# Patient Record
Sex: MALE | Race: WHITE | Employment: OTHER | URBAN - METROPOLITAN AREA
[De-identification: names, ages, dates, MRNs, and addresses within clinical notes are randomized per-mention and may not be internally consistent; named-entity substitution may affect disease eponyms.]

---

## 2017-01-19 ENCOUNTER — OFFICE VISIT (OUTPATIENT)
Dept: FAMILY MEDICINE CLINIC | Age: 67
End: 2017-01-19

## 2017-01-19 VITALS
SYSTOLIC BLOOD PRESSURE: 102 MMHG | WEIGHT: 146.38 LBS | HEIGHT: 67 IN | RESPIRATION RATE: 12 BRPM | HEART RATE: 60 BPM | DIASTOLIC BLOOD PRESSURE: 64 MMHG | BODY MASS INDEX: 22.98 KG/M2

## 2017-01-19 DIAGNOSIS — I25.10 ASHD (ARTERIOSCLEROTIC HEART DISEASE): Primary | ICD-10-CM

## 2017-01-19 DIAGNOSIS — Z00.00 ROUTINE GENERAL MEDICAL EXAMINATION AT A HEALTH CARE FACILITY: ICD-10-CM

## 2017-01-19 DIAGNOSIS — G47.31 COMPLEX SLEEP APNEA SYNDROME: ICD-10-CM

## 2017-01-19 DIAGNOSIS — N40.0 BENIGN NON-NODULAR PROSTATIC HYPERPLASIA WITHOUT LOWER URINARY TRACT SYMPTOMS: ICD-10-CM

## 2017-01-19 DIAGNOSIS — E78.00 HYPERCHOLESTEREMIA: ICD-10-CM

## 2017-01-19 PROCEDURE — G0402 INITIAL PREVENTIVE EXAM: HCPCS | Performed by: FAMILY MEDICINE

## 2017-01-19 ASSESSMENT — LIFESTYLE VARIABLES
HOW MANY STANDARD DRINKS CONTAINING ALCOHOL DO YOU HAVE ON A TYPICAL DAY: 1
HOW OFTEN DURING THE LAST YEAR HAVE YOU FOUND THAT YOU WERE NOT ABLE TO STOP DRINKING ONCE YOU HAD STARTED: 0
HOW OFTEN DURING THE LAST YEAR HAVE YOU HAD A FEELING OF GUILT OR REMORSE AFTER DRINKING: 0
HOW OFTEN DO YOU HAVE SIX OR MORE DRINKS ON ONE OCCASION: 1
HAS A RELATIVE, FRIEND, DOCTOR, OR ANOTHER HEALTH PROFESSIONAL EXPRESSED CONCERN ABOUT YOUR DRINKING OR SUGGESTED YOU CUT DOWN: 0
HOW OFTEN DURING THE LAST YEAR HAVE YOU FAILED TO DO WHAT WAS NORMALLY EXPECTED FROM YOU BECAUSE OF DRINKING: 0
AUDIT-C TOTAL SCORE: 5
HOW OFTEN DO YOU HAVE A DRINK CONTAINING ALCOHOL: 3
HOW OFTEN DURING THE LAST YEAR HAVE YOU NEEDED AN ALCOHOLIC DRINK FIRST THING IN THE MORNING TO GET YOURSELF GOING AFTER A NIGHT OF HEAVY DRINKING: 0
HOW OFTEN DURING THE LAST YEAR HAVE YOU BEEN UNABLE TO REMEMBER WHAT HAPPENED THE NIGHT BEFORE BECAUSE YOU HAD BEEN DRINKING: 0
AUDIT TOTAL SCORE: 5
HAVE YOU OR SOMEONE ELSE BEEN INJURED AS A RESULT OF YOUR DRINKING: 0

## 2017-01-19 ASSESSMENT — PATIENT HEALTH QUESTIONNAIRE - PHQ9: SUM OF ALL RESPONSES TO PHQ QUESTIONS 1-9: 0

## 2017-01-19 ASSESSMENT — ANXIETY QUESTIONNAIRES: GAD7 TOTAL SCORE: 0

## 2017-02-02 RX ORDER — METHYLPHENIDATE HYDROCHLORIDE 10 MG/1
10 TABLET ORAL 3 TIMES DAILY
Qty: 270 TABLET | Refills: 0 | Status: SHIPPED | OUTPATIENT
Start: 2017-02-02 | End: 2017-05-30 | Stop reason: SDUPTHER

## 2017-03-06 ENCOUNTER — TELEPHONE (OUTPATIENT)
Dept: FAMILY MEDICINE CLINIC | Age: 67
End: 2017-03-06

## 2017-04-06 DIAGNOSIS — E78.00 HYPERCHOLESTEREMIA: ICD-10-CM

## 2017-04-07 RX ORDER — ATORVASTATIN CALCIUM 40 MG/1
TABLET, FILM COATED ORAL
Qty: 30 TABLET | Refills: 10 | Status: SHIPPED | OUTPATIENT
Start: 2017-04-07 | End: 2018-04-28 | Stop reason: SDUPTHER

## 2017-05-11 ENCOUNTER — OFFICE VISIT (OUTPATIENT)
Dept: PULMONOLOGY | Age: 67
End: 2017-05-11

## 2017-05-11 VITALS
WEIGHT: 148 LBS | HEIGHT: 67 IN | HEART RATE: 71 BPM | BODY MASS INDEX: 23.23 KG/M2 | OXYGEN SATURATION: 98 % | SYSTOLIC BLOOD PRESSURE: 128 MMHG | DIASTOLIC BLOOD PRESSURE: 82 MMHG

## 2017-05-11 DIAGNOSIS — R06.3 CENTRAL SLEEP APNEA DUE TO CHEYNE-STOKES RESPIRATION: ICD-10-CM

## 2017-05-11 DIAGNOSIS — I25.10 CORONARY ARTERY DISEASE INVOLVING NATIVE CORONARY ARTERY OF NATIVE HEART WITHOUT ANGINA PECTORIS: ICD-10-CM

## 2017-05-11 DIAGNOSIS — I25.10 ASHD (ARTERIOSCLEROTIC HEART DISEASE): ICD-10-CM

## 2017-05-11 DIAGNOSIS — G47.31 COMPLEX SLEEP APNEA SYNDROME: Primary | ICD-10-CM

## 2017-05-11 PROCEDURE — 99213 OFFICE O/P EST LOW 20 MIN: CPT | Performed by: PHYSICIAN ASSISTANT

## 2017-05-18 ENCOUNTER — TELEPHONE (OUTPATIENT)
Dept: PULMONOLOGY | Age: 67
End: 2017-05-18

## 2017-05-18 DIAGNOSIS — G47.31 COMPLEX SLEEP APNEA SYNDROME: Primary | ICD-10-CM

## 2017-05-19 ENCOUNTER — TELEPHONE (OUTPATIENT)
Dept: FAMILY MEDICINE CLINIC | Age: 67
End: 2017-05-19

## 2017-05-30 RX ORDER — METHYLPHENIDATE HYDROCHLORIDE 10 MG/1
10 TABLET ORAL 3 TIMES DAILY
Qty: 270 TABLET | Refills: 0 | Status: SHIPPED | OUTPATIENT
Start: 2017-05-30 | End: 2017-05-31 | Stop reason: SDUPTHER

## 2017-05-31 RX ORDER — METHYLPHENIDATE HYDROCHLORIDE 10 MG/1
10 TABLET ORAL 3 TIMES DAILY
Qty: 270 TABLET | Refills: 0 | Status: SHIPPED | OUTPATIENT
Start: 2017-05-31 | End: 2017-10-05

## 2017-07-28 ENCOUNTER — OFFICE VISIT (OUTPATIENT)
Dept: FAMILY MEDICINE CLINIC | Age: 67
End: 2017-07-28

## 2017-07-28 VITALS
HEIGHT: 68 IN | RESPIRATION RATE: 14 BRPM | WEIGHT: 150.25 LBS | HEART RATE: 68 BPM | BODY MASS INDEX: 22.77 KG/M2 | DIASTOLIC BLOOD PRESSURE: 74 MMHG | SYSTOLIC BLOOD PRESSURE: 124 MMHG

## 2017-07-28 DIAGNOSIS — I25.10 ASHD (ARTERIOSCLEROTIC HEART DISEASE): Primary | ICD-10-CM

## 2017-07-28 DIAGNOSIS — I77.9 CAROTID DISEASE, BILATERAL (HCC): ICD-10-CM

## 2017-07-28 DIAGNOSIS — N40.1 BENIGN NON-NODULAR PROSTATIC HYPERPLASIA WITH LOWER URINARY TRACT SYMPTOMS: ICD-10-CM

## 2017-07-28 DIAGNOSIS — G47.31 COMPLEX SLEEP APNEA SYNDROME: ICD-10-CM

## 2017-07-28 DIAGNOSIS — K63.5 COLON POLYPS: ICD-10-CM

## 2017-07-28 LAB
HEMOCCULT STL QL: NEGATIVE
HEMOCCULT STL QL: NORMAL
HEMOCCULT STL QL: NORMAL

## 2017-07-28 PROCEDURE — 99214 OFFICE O/P EST MOD 30 MIN: CPT | Performed by: FAMILY MEDICINE

## 2017-07-28 PROCEDURE — 82270 OCCULT BLOOD FECES: CPT | Performed by: FAMILY MEDICINE

## 2017-07-28 RX ORDER — FLUTICASONE PROPIONATE 50 MCG
1 SPRAY, SUSPENSION (ML) NASAL EVERY OTHER DAY
COMMUNITY
End: 2017-09-19

## 2017-07-28 RX ORDER — CETIRIZINE HYDROCHLORIDE 10 MG/1
10 TABLET ORAL DAILY
COMMUNITY
End: 2022-02-18

## 2017-07-28 ASSESSMENT — ENCOUNTER SYMPTOMS
NAUSEA: 0
EYE PAIN: 0
COUGH: 0
CHEST TIGHTNESS: 0
ABDOMINAL PAIN: 0
BLOOD IN STOOL: 0
TROUBLE SWALLOWING: 0
BACK PAIN: 0
SORE THROAT: 0
CONSTIPATION: 0
SHORTNESS OF BREATH: 0

## 2017-07-28 ASSESSMENT — PATIENT HEALTH QUESTIONNAIRE - PHQ9
1. LITTLE INTEREST OR PLEASURE IN DOING THINGS: 0
2. FEELING DOWN, DEPRESSED OR HOPELESS: 0
SUM OF ALL RESPONSES TO PHQ9 QUESTIONS 1 & 2: 0
SUM OF ALL RESPONSES TO PHQ QUESTIONS 1-9: 0

## 2017-09-19 ENCOUNTER — OFFICE VISIT (OUTPATIENT)
Dept: FAMILY MEDICINE CLINIC | Age: 67
End: 2017-09-19

## 2017-09-19 VITALS
SYSTOLIC BLOOD PRESSURE: 112 MMHG | HEIGHT: 68 IN | HEART RATE: 60 BPM | DIASTOLIC BLOOD PRESSURE: 60 MMHG | WEIGHT: 153.38 LBS | RESPIRATION RATE: 10 BRPM | BODY MASS INDEX: 23.25 KG/M2

## 2017-09-19 DIAGNOSIS — Z23 NEED FOR IMMUNIZATION AGAINST INFLUENZA: ICD-10-CM

## 2017-09-19 DIAGNOSIS — K13.0 CELLULITIS OF SKIN OF LIP: Primary | ICD-10-CM

## 2017-09-19 PROCEDURE — G0008 ADMIN INFLUENZA VIRUS VAC: HCPCS | Performed by: FAMILY MEDICINE

## 2017-09-19 PROCEDURE — 99213 OFFICE O/P EST LOW 20 MIN: CPT | Performed by: FAMILY MEDICINE

## 2017-09-19 RX ORDER — IPRATROPIUM BROMIDE 42 UG/1
2 SPRAY, METERED NASAL DAILY PRN
COMMUNITY
End: 2022-02-18

## 2017-09-19 RX ORDER — CEPHALEXIN 500 MG/1
500 CAPSULE ORAL 3 TIMES DAILY
Qty: 30 CAPSULE | Refills: 0 | Status: SHIPPED | OUTPATIENT
Start: 2017-09-19 | End: 2017-10-05

## 2017-09-26 ENCOUNTER — HOSPITAL ENCOUNTER (OUTPATIENT)
Dept: AUDIOLOGY | Age: 67
Discharge: HOME | End: 2017-09-26

## 2017-09-26 PROCEDURE — 92593 HC HEARING AID CHECK, BOTH EARS: CPT | Performed by: AUDIOLOGIST

## 2017-10-05 ENCOUNTER — OFFICE VISIT (OUTPATIENT)
Dept: PULMONOLOGY | Age: 67
End: 2017-10-05
Payer: COMMERCIAL

## 2017-10-05 VITALS
DIASTOLIC BLOOD PRESSURE: 82 MMHG | SYSTOLIC BLOOD PRESSURE: 128 MMHG | HEART RATE: 63 BPM | WEIGHT: 151.8 LBS | OXYGEN SATURATION: 99 % | HEIGHT: 67 IN | BODY MASS INDEX: 23.83 KG/M2

## 2017-10-05 DIAGNOSIS — G47.419 PRIMARY NARCOLEPSY WITHOUT CATAPLEXY: ICD-10-CM

## 2017-10-05 DIAGNOSIS — G47.31 COMPLEX SLEEP APNEA SYNDROME: Primary | ICD-10-CM

## 2017-10-05 DIAGNOSIS — R06.3 CENTRAL SLEEP APNEA DUE TO CHEYNE-STOKES RESPIRATION: ICD-10-CM

## 2017-10-05 PROCEDURE — 99213 OFFICE O/P EST LOW 20 MIN: CPT | Performed by: PHYSICIAN ASSISTANT

## 2017-10-05 RX ORDER — ARMODAFINIL 200 MG/1
1 TABLET ORAL DAILY
Qty: 30 TABLET | Refills: 0
Start: 2017-10-05 | End: 2017-10-30

## 2017-10-05 NOTE — PROGRESS NOTES
 COLONOSCOPY  2007,         CORONARY ANGIOPLASTY WITH STENT PLACEMENT  7/05    HEMORRHOID SURGERY  09/2016    Dr Nazario Crowe, every 2 weeks x 3 times     ROTATOR CUFF REPAIR      ROTATOR CUFF REPAIR Left 10-08-16    Dr Ignacio Wade @ [address] History   Substance Use Topics    Smoking status: Former Smoker     Types: Cigarettes     Quit date: 7/2/2005    Smokeless tobacco: Never Used    Alcohol use Yes      Comment: social       No Known Allergies    Current Outpatient Prescriptions   Medication Sig Dispense Refill    ipratropium (ATROVENT) 0.06 % nasal spray 2 sprays by Nasal route 3 times daily      cetirizine (ZYRTEC) 10 MG tablet Take 10 mg by mouth every other day      methylphenidate (RITALIN) 10 MG tablet Take 1 tablet by mouth 3 times daily . 270 tablet 0    atorvastatin (LIPITOR) 40 MG tablet TAKE 1 TABLET BY MOUTH ONE TIME A DAY  30 tablet 10    NONFORMULARY Indications: [employer] Select       fluocinonide (LIDEX) 0.05 % gel Apply topically 2 times daily. 15 g 1    B Complex Vitamins (VITAMIN-B COMPLEX PO) Take 1 tablet by mouth daily.  Calcium Carbonate-Vitamin D (CALCIUM 500/VITAMIN D PO) Take 1 tablet by mouth daily.  aspirin 81 MG chewable tablet Take 81 mg by mouth daily. No current facility-administered medications for this visit.         Family History   Problem Relation Age of Onset    Stroke Mother     Diabetes Mother     Heart Disease Father         Review of Systems -   General ROS: stable / unchanged  ENT ROS: negative for - nasal congestion, oral lesions or sore throat  Hematological and Lymphatic ROS: negative  Endocrine ROS: negative  Respiratory ROS: no cough, shortness of breath, or wheezing  Cardiovascular ROS: no chest pain or dyspnea on exertion  Gastrointestinal ROS: no abdominal pain, change in bowel habits, or black or bloody stools  Musculoskeletal ROS: negative  Neurological ROS: negative    Physical Exam:    BMI:  Body

## 2017-10-27 ENCOUNTER — OFFICE VISIT (OUTPATIENT)
Dept: CARDIOLOGY CLINIC | Age: 67
End: 2017-10-27
Payer: COMMERCIAL

## 2017-10-27 VITALS
HEIGHT: 67 IN | DIASTOLIC BLOOD PRESSURE: 70 MMHG | BODY MASS INDEX: 24.16 KG/M2 | WEIGHT: 153.9 LBS | SYSTOLIC BLOOD PRESSURE: 140 MMHG | HEART RATE: 62 BPM

## 2017-10-27 DIAGNOSIS — E78.01 FAMILIAL HYPERCHOLESTEROLEMIA: ICD-10-CM

## 2017-10-27 DIAGNOSIS — I25.118 CORONARY ARTERY DISEASE INVOLVING NATIVE CORONARY ARTERY OF NATIVE HEART WITH OTHER FORM OF ANGINA PECTORIS (HCC): ICD-10-CM

## 2017-10-27 DIAGNOSIS — R06.09 DYSPNEA ON EXERTION: Primary | ICD-10-CM

## 2017-10-27 PROCEDURE — 93000 ELECTROCARDIOGRAM COMPLETE: CPT | Performed by: NUCLEAR MEDICINE

## 2017-10-27 PROCEDURE — 99213 OFFICE O/P EST LOW 20 MIN: CPT | Performed by: NUCLEAR MEDICINE

## 2017-10-27 RX ORDER — METOPROLOL SUCCINATE 25 MG/1
25 TABLET, EXTENDED RELEASE ORAL DAILY
COMMUNITY
End: 2017-10-27 | Stop reason: SDUPTHER

## 2017-10-27 RX ORDER — METOPROLOL SUCCINATE 25 MG/1
25 TABLET, EXTENDED RELEASE ORAL DAILY
Qty: 30 TABLET | Refills: 6 | Status: SHIPPED | OUTPATIENT
Start: 2017-10-27 | End: 2017-11-09

## 2017-10-27 NOTE — PROGRESS NOTES
[facility]  [facility] [place] [facility] [place] [address]  Dept: [phone]  Dept Fax: [phone]  Loc: [phone]    Visit Date: 10/27/2017    Sally Lozoya is a 78 y.o. male who presents today for:  Chief Complaint   Patient presents with    Shortness of Breath    Coronary Artery Disease    Hyperlipidemia     Worsening dyspnea on exertion   Lower capacity with exertion   Major change  Known stents 2005  No cath or stress test lately   Higher risk patient  BP is stable   No obvious chest pain       HPI:  HPI  Past Medical History:   Diagnosis Date    Actinic keratoses     ASHD (arteriosclerotic heart disease)     Carotid disease, bilateral ([address] 2017    Colon polyps 5/07    Hypercholesteremia 7/97    Narcolepsy     Rotator cuff (capsule) sprain     Tobacco abuse     Unspecified sleep apnea       Past Surgical History:   Procedure Laterality Date    APPENDECTOMY      CATARACT REMOVAL Bilateral 2016    Right- Oct, Left- Dec- [facility]     COLONOSCOPY  2007,     sheik    CORONARY ANGIOPLASTY WITH STENT PLACEMENT  7/05    HEMORRHOID SURGERY  09/2016    Dr Lyudmila Brice, every 2 weeks x 3 times     ROTATOR CUFF REPAIR      ROTATOR CUFF REPAIR Left 10-08-16    Dr Tamara Richardson @ [facility]     TONSILLECTOMY       Family History   Problem Relation Age of Onset    Stroke Mother     Diabetes Mother     Heart Disease Father      Social History   Substance Use Topics    Smoking status: Former Smoker     Types: Cigarettes     Quit date: 7/2/2005    Smokeless tobacco: Never Used    Alcohol use Yes      Comment: social      Current Outpatient Prescriptions   Medication Sig Dispense Refill    Armodafinil (NUVIGIL) 200 MG TABS Take 1 tablet by mouth daily 30 tablet 0    ipratropium (ATROVENT) 0.06 % nasal spray 2 sprays by Nasal route 3 times daily      cetirizine (ZYRTEC) 10 MG tablet Take 10 mg by mouth every other day      atorvastatin (LIPITOR) 40 MG tablet high risk   'ECG in office was done today. I reviewed the ECG. No acute findings      Plan:  No Follow-up on file. Discussed options  Low dose toprol   Cardiac cathterizaion, risks and benefits discussed with the patient and family at length. Patient was agreeable. Patient was advised to report to the ER if he has recurrent symptoms with specific instructions given about severity and duration of symptoms    Continue risk factor modification and medical management  Thank you for allowing me to participate in the care of your patient. Please don't hesitate to contact me regarding any further issues related to the patient care    Orders Placed:  Orders Placed This Encounter   Procedures    EKG 12 Lead     Order Specific Question:   Reason for Exam?     Answer: Other       Medications Prescribed:  No orders of the defined types were placed in this encounter. Discussed use, benefit, and side effects of prescribed medications. All patient questions answered. Pt voiced understanding. Instructed to continue current medications, diet and exercise. Continue risk factor modification and medical management. Patient agreed with treatment plan. Follow up as directed.     Electronically signed by Esther Murray MD on 10/27/2017 at 1:01 PM

## 2017-10-27 NOTE — PROGRESS NOTES
Patient here for a 1 year follow up    EKG done today    Patient denies cp, dizziness, swelling and palpitations     Patient complains of sob

## 2017-10-30 ENCOUNTER — TELEPHONE (OUTPATIENT)
Dept: PULMONOLOGY | Age: 67
End: 2017-10-30

## 2017-10-30 DIAGNOSIS — G47.419 PRIMARY NARCOLEPSY WITHOUT CATAPLEXY: Primary | ICD-10-CM

## 2017-10-30 RX ORDER — DEXTROAMPHETAMINE SACCHARATE, AMPHETAMINE ASPARTATE, DEXTROAMPHETAMINE SULFATE AND AMPHETAMINE SULFATE 2.5; 2.5; 2.5; 2.5 MG/1; MG/1; MG/1; MG/1
10 TABLET ORAL 2 TIMES DAILY
Qty: 60 TABLET | Refills: 0 | Status: SHIPPED | OUTPATIENT
Start: 2017-10-30 | End: 2017-10-30 | Stop reason: ALTCHOICE

## 2017-10-30 RX ORDER — ARMODAFINIL 200 MG/1
TABLET ORAL DAILY
COMMUNITY
End: 2017-11-08

## 2017-10-30 RX ORDER — M-VIT,TX,IRON,MINS/CALC/FOLIC 27MG-0.4MG
1 TABLET ORAL DAILY
Status: ON HOLD | COMMUNITY
End: 2017-11-02 | Stop reason: ALTCHOICE

## 2017-10-30 RX ORDER — DEXTROAMPHETAMINE SACCHARATE, AMPHETAMINE ASPARTATE, DEXTROAMPHETAMINE SULFATE AND AMPHETAMINE SULFATE 2.5; 2.5; 2.5; 2.5 MG/1; MG/1; MG/1; MG/1
10 TABLET ORAL 2 TIMES DAILY
Qty: 60 TABLET | Refills: 0 | Status: SHIPPED | OUTPATIENT
Start: 2017-10-30 | End: 2017-11-08

## 2017-10-30 NOTE — TELEPHONE ENCOUNTER
Adderall reordered. Adderall can cause increased BP so will need to monitor but any of the stimulants can cause that.

## 2017-10-30 NOTE — TELEPHONE ENCOUNTER
Leslie Ross called in questioning Nuvigil. He was given samples on 10/5/2017, Nuvigil 200 mg tab. Rhea Fraire takes daily at 9 am and he finds himself very tired by 3 pm.  Rhea Fraire explained if he is not busy, he will fall asleep. He also noticed Nuvigil is causing him to have some tightness in his back when he wakes up. He is questioning whether he needs a stronger dose or is there something else that should be discussed or tried? Please advise. Rhea Fraire is scheduled for follow up on 1/15/18.

## 2017-11-01 ENCOUNTER — PREP FOR PROCEDURE (OUTPATIENT)
Dept: CARDIOLOGY CLINIC | Age: 67
End: 2017-11-01

## 2017-11-01 RX ORDER — SODIUM CHLORIDE 0.9 % (FLUSH) 0.9 %
10 SYRINGE (ML) INJECTION EVERY 12 HOURS SCHEDULED
Status: CANCELLED | OUTPATIENT
Start: 2017-11-01

## 2017-11-01 RX ORDER — SODIUM CHLORIDE 9 MG/ML
INJECTION, SOLUTION INTRAVENOUS CONTINUOUS
Status: CANCELLED | OUTPATIENT
Start: 2017-11-01

## 2017-11-01 RX ORDER — SODIUM CHLORIDE 0.9 % (FLUSH) 0.9 %
10 SYRINGE (ML) INJECTION PRN
Status: CANCELLED | OUTPATIENT
Start: 2017-11-01

## 2017-11-02 ENCOUNTER — HOSPITAL ENCOUNTER (OUTPATIENT)
Dept: INPATIENT UNIT | Age: 67
Discharge: HOME | End: 2017-11-03
Attending: NUCLEAR MEDICINE | Admitting: NUCLEAR MEDICINE
Payer: COMMERCIAL

## 2017-11-02 ENCOUNTER — APPOINTMENT (OUTPATIENT)
Dept: CARDIAC CATH/INVASIVE PROCEDURES | Age: 67
End: 2017-11-02
Attending: NUCLEAR MEDICINE
Payer: COMMERCIAL

## 2017-11-02 LAB
ABO: NORMAL
ANION GAP SERPL CALCULATED.3IONS-SCNC: 14 MEQ/L (ref 8–16)
ANTIBODY SCREEN: NORMAL
BUN BLDV-MCNC: 21 MG/DL (ref 7–22)
CALCIUM SERPL-MCNC: 8.9 MG/DL (ref 8.5–10.5)
CHLORIDE BLD-SCNC: 105 MEQ/L (ref 98–111)
CHOLESTEROL, TOTAL: 157 MG/DL (ref 100–199)
CO2: 26 MEQ/L (ref 23–33)
CREAT SERPL-MCNC: 0.8 MG/DL (ref 0.4–1.2)
EKG ATRIAL RATE: 52 BPM
EKG ATRIAL RATE: 54 BPM
EKG P AXIS: 51 DEGREES
EKG P AXIS: 58 DEGREES
EKG P-R INTERVAL: 188 MS
EKG P-R INTERVAL: 190 MS
EKG Q-T INTERVAL: 406 MS
EKG Q-T INTERVAL: 418 MS
EKG QRS DURATION: 86 MS
EKG QRS DURATION: 88 MS
EKG QTC CALCULATION (BAZETT): 385 MS
EKG QTC CALCULATION (BAZETT): 388 MS
EKG R AXIS: 73 DEGREES
EKG R AXIS: 81 DEGREES
EKG T AXIS: -15 DEGREES
EKG T AXIS: 48 DEGREES
EKG VENTRICULAR RATE: 52 BPM
EKG VENTRICULAR RATE: 54 BPM
GFR SERPL CREATININE-BSD FRML MDRD: > 90 ML/MIN/1.73M2
GLUCOSE BLD-MCNC: 107 MG/DL (ref 70–108)
HCT VFR BLD CALC: 49.9 % (ref 42–52)
HDLC SERPL-MCNC: 61 MG/DL
HEMOGLOBIN: 17.3 GM/DL (ref 14–18)
LDL CHOLESTEROL CALCULATED: 79 MG/DL
MCH RBC QN AUTO: 32.7 PG (ref 27–31)
MCHC RBC AUTO-ENTMCNC: 34.7 GM/DL (ref 33–37)
MCV RBC AUTO: 94.5 FL (ref 80–94)
PDW BLD-RTO: 12.8 % (ref 11.5–14.5)
PLATELET # BLD: 203 THOU/MM3 (ref 130–400)
PMV BLD AUTO: 7.4 MCM (ref 7.4–10.4)
POTASSIUM SERPL-SCNC: 4.6 MEQ/L (ref 3.5–5.2)
RBC # BLD: 5.28 MILL/MM3 (ref 4.7–6.1)
RH FACTOR: NORMAL
SODIUM BLD-SCNC: 145 MEQ/L (ref 135–145)
TRIGL SERPL-MCNC: 84 MG/DL (ref 0–199)
WBC # BLD: 4.8 THOU/MM3 (ref 4.8–10.8)

## 2017-11-02 PROCEDURE — 6370000000 HC RX 637 (ALT 250 FOR IP): Performed by: INTERNAL MEDICINE

## 2017-11-02 PROCEDURE — 93005 ELECTROCARDIOGRAM TRACING: CPT

## 2017-11-02 PROCEDURE — C1769 GUIDE WIRE: HCPCS

## 2017-11-02 PROCEDURE — 2720000010 HC SURG SUPPLY STERILE

## 2017-11-02 PROCEDURE — A6258 TRANSPARENT FILM >16<=48 IN: HCPCS

## 2017-11-02 PROCEDURE — 93458 L HRT ARTERY/VENTRICLE ANGIO: CPT | Performed by: NUCLEAR MEDICINE

## 2017-11-02 PROCEDURE — 2580000003 HC RX 258: Performed by: NUCLEAR MEDICINE

## 2017-11-02 PROCEDURE — C1760 CLOSURE DEV, VASC: HCPCS

## 2017-11-02 PROCEDURE — C1887 CATHETER, GUIDING: HCPCS

## 2017-11-02 PROCEDURE — 86850 RBC ANTIBODY SCREEN: CPT

## 2017-11-02 PROCEDURE — 80048 BASIC METABOLIC PNL TOTAL CA: CPT

## 2017-11-02 PROCEDURE — 6370000000 HC RX 637 (ALT 250 FOR IP)

## 2017-11-02 PROCEDURE — 92928 PRQ TCAT PLMT NTRAC ST 1 LES: CPT | Performed by: INTERNAL MEDICINE

## 2017-11-02 PROCEDURE — 2580000003 HC RX 258

## 2017-11-02 PROCEDURE — 85027 COMPLETE CBC AUTOMATED: CPT

## 2017-11-02 PROCEDURE — C1725 CATH, TRANSLUMIN NON-LASER: HCPCS

## 2017-11-02 PROCEDURE — 2500000003 HC RX 250 WO HCPCS

## 2017-11-02 PROCEDURE — 93571 IV DOP VEL&/PRESS C FLO 1ST: CPT | Performed by: INTERNAL MEDICINE

## 2017-11-02 PROCEDURE — 93571 IV DOP VEL&/PRESS C FLO 1ST: CPT | Performed by: NUCLEAR MEDICINE

## 2017-11-02 PROCEDURE — 6360000002 HC RX W HCPCS

## 2017-11-02 PROCEDURE — 36415 COLL VENOUS BLD VENIPUNCTURE: CPT

## 2017-11-02 PROCEDURE — 86900 BLOOD TYPING SEROLOGIC ABO: CPT

## 2017-11-02 PROCEDURE — C1894 INTRO/SHEATH, NON-LASER: HCPCS

## 2017-11-02 PROCEDURE — 92928 PRQ TCAT PLMT NTRAC ST 1 LES: CPT | Performed by: NUCLEAR MEDICINE

## 2017-11-02 PROCEDURE — 2580000003 HC RX 258: Performed by: INTERNAL MEDICINE

## 2017-11-02 PROCEDURE — 80061 LIPID PANEL: CPT

## 2017-11-02 PROCEDURE — 2780000010 HC IMPLANT OTHER

## 2017-11-02 PROCEDURE — C1874 STENT, COATED/COV W/DEL SYS: HCPCS

## 2017-11-02 PROCEDURE — 86901 BLOOD TYPING SEROLOGIC RH(D): CPT

## 2017-11-02 RX ORDER — SODIUM CHLORIDE 0.9 % (FLUSH) 0.9 %
10 SYRINGE (ML) INJECTION PRN
Status: DISCONTINUED | OUTPATIENT
Start: 2017-11-02 | End: 2017-11-03 | Stop reason: HOSPADM

## 2017-11-02 RX ORDER — ASPIRIN 81 MG/1
81 TABLET, CHEWABLE ORAL DAILY
Status: DISCONTINUED | OUTPATIENT
Start: 2017-11-03 | End: 2017-11-03 | Stop reason: HOSPADM

## 2017-11-02 RX ORDER — ATORVASTATIN CALCIUM 40 MG/1
40 TABLET, FILM COATED ORAL DAILY
Status: DISCONTINUED | OUTPATIENT
Start: 2017-11-03 | End: 2017-11-03 | Stop reason: HOSPADM

## 2017-11-02 RX ORDER — CETIRIZINE HYDROCHLORIDE 10 MG/1
10 TABLET ORAL DAILY
Status: DISCONTINUED | OUTPATIENT
Start: 2017-11-03 | End: 2017-11-03 | Stop reason: HOSPADM

## 2017-11-02 RX ORDER — SODIUM CHLORIDE 0.9 % (FLUSH) 0.9 %
10 SYRINGE (ML) INJECTION EVERY 12 HOURS SCHEDULED
Status: DISCONTINUED | OUTPATIENT
Start: 2017-11-02 | End: 2017-11-03 | Stop reason: HOSPADM

## 2017-11-02 RX ORDER — ONDANSETRON 2 MG/ML
4 INJECTION INTRAMUSCULAR; INTRAVENOUS EVERY 6 HOURS PRN
Status: DISCONTINUED | OUTPATIENT
Start: 2017-11-02 | End: 2017-11-03 | Stop reason: HOSPADM

## 2017-11-02 RX ORDER — ASPIRIN 325 MG
325 TABLET ORAL ONCE
Status: DISCONTINUED | OUTPATIENT
Start: 2017-11-02 | End: 2017-11-02

## 2017-11-02 RX ORDER — METOPROLOL SUCCINATE 25 MG/1
25 TABLET, EXTENDED RELEASE ORAL DAILY
Status: DISCONTINUED | OUTPATIENT
Start: 2017-11-03 | End: 2017-11-03 | Stop reason: HOSPADM

## 2017-11-02 RX ORDER — SODIUM CHLORIDE 9 MG/ML
INJECTION, SOLUTION INTRAVENOUS CONTINUOUS
Status: DISCONTINUED | OUTPATIENT
Start: 2017-11-02 | End: 2017-11-02

## 2017-11-02 RX ORDER — DEXTROAMPHETAMINE SACCHARATE, AMPHETAMINE ASPARTATE, DEXTROAMPHETAMINE SULFATE AND AMPHETAMINE SULFATE 2.5; 2.5; 2.5; 2.5 MG/1; MG/1; MG/1; MG/1
10 TABLET ORAL 2 TIMES DAILY
Status: DISCONTINUED | OUTPATIENT
Start: 2017-11-02 | End: 2017-11-03 | Stop reason: HOSPADM

## 2017-11-02 RX ORDER — SODIUM CHLORIDE 0.9 % (FLUSH) 0.9 %
10 SYRINGE (ML) INJECTION EVERY 12 HOURS SCHEDULED
Status: DISCONTINUED | OUTPATIENT
Start: 2017-11-02 | End: 2017-11-02

## 2017-11-02 RX ORDER — IPRATROPIUM BROMIDE 42 UG/1
2 SPRAY, METERED NASAL 2 TIMES DAILY
Status: DISCONTINUED | OUTPATIENT
Start: 2017-11-02 | End: 2017-11-03 | Stop reason: HOSPADM

## 2017-11-02 RX ORDER — SODIUM CHLORIDE 9 MG/ML
75 INJECTION, SOLUTION INTRAVENOUS CONTINUOUS
Status: DISCONTINUED | OUTPATIENT
Start: 2017-11-02 | End: 2017-11-03 | Stop reason: HOSPADM

## 2017-11-02 RX ORDER — ACETAMINOPHEN 325 MG/1
650 TABLET ORAL EVERY 4 HOURS PRN
Status: DISCONTINUED | OUTPATIENT
Start: 2017-11-02 | End: 2017-11-03 | Stop reason: HOSPADM

## 2017-11-02 RX ADMIN — SODIUM CHLORIDE: 9 INJECTION, SOLUTION INTRAVENOUS at 11:23

## 2017-11-02 RX ADMIN — SODIUM CHLORIDE 75 ML/HR: 9 INJECTION, SOLUTION INTRAVENOUS at 23:13

## 2017-11-02 RX ADMIN — TICAGRELOR 90 MG: 90 TABLET ORAL at 23:04

## 2017-11-02 NOTE — OP NOTE
[address] [address]  Sedation/Analgesia Post Sedation Record        Pt Name: Paulino Salgado  MRN: [id number]  YOB: 1950  Procedure Performed By: Austin Baez MD, [facility]  Primary Care Physician: Kilo Cifuentes MD        POST-PROCEDURE    Physicians/Assistants: Austin Baez MD, [facility], [facility], [facility]    Procedure Performed:  FFR and PCI to LAD                                  Sedation/Anesthesia:  Local Anesthesia and IV Conscious Sedation with continuous O2 monitoring    Estimated Blood Loss: 10 cc     Specimens Removed:  [x]None []Other:      Disposition of Specimen:  []Pathology []Other        Complications:   [x]None Immediate []Other:       Post Procedure Diagnosis/Findings:  Coronary Artery Disease          Recommendations:  Medical treatment and review films.                Austin Baez MD, [facility]  Electronically signed 11/2/2017 at 3:50 PM

## 2017-11-02 NOTE — FLOWSHEET NOTE
Received in Cath Recovery. Report received from Cath Lab. Right femoral  site has no signs of bleeding or swelling, area soft. Tegaderm dressing clean, dry, and intact. IV 1000 0.9 NS infusing at 75 ml per hour. Denies pain or discomfort. Monitor showing sinus sebastian. See Post Cath Assessment flowsheet.   IV Nitro infusing at [employer]  IV Angiomax infusing at 23.8ml

## 2017-11-02 NOTE — PROCEDURES
[address] [place] [address]                           CARDIAC CATHETERIZATION    PATIENT NAME: Jules Chanel                              :       1950  MED REC NO:   [id number]                                      ROOM:      0003  ACCOUNT NO:   [de-identified]                                      ADMISSION  DATE:  2017  PROVIDER:     Fabian Mims M.D.    Rita Albrecht:  2017    CLINICAL INDICATION:  This is a 56-year-old patient with a history of  coronary artery disease, angioplasty and stenting of the LAD, symptoms  of angina lately, chest pressure, heaviness, tightness, and shortness  of breath with exertion, referred for cardiac cath due to his  worsening symptoms that seemed to be relatively subacute. PROCEDURES:  1. Left heart cath, LV gram.  2.  Coronary angiogram, right and left. 3.  Sedation. PROCEDURE DETAILS:  Please refer to my catheterization detailed note. HEMODYNAMIC RESULTS AND LEFT VENTRICULOGRAM:  Left ventricular  end-diastolic pressure was 12 mmHg. No significant change before and  after contrast injection. No significant gradient across the aortic  valve to signify aortic stenosis. Left ventricular function was  normal, EF 60%. CORONARY ANGIOGRAM RESULTS:  1. Left main is patent, gives rise to LAD and left circumflex. 2.  Left anterior descending artery has heavy calcification and there  is a stent in the proximal segment, which has some haziness and  diffuse disease about 60% with diffuse disease distally in the distal  LAD. 3.  Left circumflex artery has diffuse nonspecific findings with  diffuse nonobstructive disease and is dominant. 4.  Right coronary artery is nondominant, relatively small, and has  nonobstructive mild luminal irregularities. CONCLUSION:  1. Pretty much nonobstructive disease with suspicious lesion in the  area of the stent of the LAD.   2.  Normal

## 2017-11-03 ENCOUNTER — TELEPHONE (OUTPATIENT)
Dept: CARDIOLOGY CLINIC | Age: 67
End: 2017-11-03

## 2017-11-03 VITALS
HEART RATE: 57 BPM | BODY MASS INDEX: 23.64 KG/M2 | OXYGEN SATURATION: 97 % | WEIGHT: 150.6 LBS | DIASTOLIC BLOOD PRESSURE: 80 MMHG | RESPIRATION RATE: 16 BRPM | TEMPERATURE: 97.8 F | HEIGHT: 67 IN | SYSTOLIC BLOOD PRESSURE: 136 MMHG

## 2017-11-03 PROCEDURE — 99213 OFFICE O/P EST LOW 20 MIN: CPT | Performed by: PHYSICIAN ASSISTANT

## 2017-11-03 PROCEDURE — 6370000000 HC RX 637 (ALT 250 FOR IP): Performed by: INTERNAL MEDICINE

## 2017-11-03 PROCEDURE — 96361 HYDRATE IV INFUSION ADD-ON: CPT

## 2017-11-03 RX ORDER — NITROGLYCERIN 0.4 MG/1
0.4 TABLET SUBLINGUAL EVERY 5 MIN PRN
Qty: 25 TABLET | Refills: 3 | Status: SHIPPED | OUTPATIENT
Start: 2017-11-03 | End: 2020-12-21

## 2017-11-03 RX ADMIN — CETIRIZINE HYDROCHLORIDE 10 MG: 10 TABLET ORAL at 08:02

## 2017-11-03 RX ADMIN — TICAGRELOR 90 MG: 90 TABLET ORAL at 08:04

## 2017-11-03 ASSESSMENT — PAIN SCALES - GENERAL: PAINLEVEL_OUTOF10: 0

## 2017-11-03 NOTE — PROGRESS NOTES
Inpatient Cardiac Rehabilitation Consult    Received consult for Phase II Cardiac Rehabilitation. Brochure given. Will check ins and follow up with pt.

## 2017-11-03 NOTE — CARE COORDINATION
11/3/17, 10:47 AM    Outpatient in a bed s/p FFR and PCI to LAD. Discharged to home with spouse prior to CM assessment. Discharge plan discussed by  and . Discharge plan reviewed with patient/ family. Patient/ family verbalize understanding of discharge plan and are in agreement with plan. Understanding was demonstrated using the teach back method.

## 2017-11-03 NOTE — DISCHARGE SUMMARY
Cardiology Discharge Summary      Patient Identification:  Izabel Jones  : 1950  MRN: [id number]   Account: [de-identified]     Admit date: 2017  Discharge date: 11/3/2017   Attending provider: Bronson Goldmann, MD        Primary care provider: Jackelyn Kunz MD     Admission Diagnoses:  Class III angina  History of PCI        Discharge Diagnoses: Active Problems:    Angina effort ([address]    FFR of the LAD  PCI of the LAD     Hospital Course:   Izabel Jones is a 78 y.o. male admitted to [address] [place] [address] on 2017  with a history of  coronary artery disease, angioplasty and stenting of the LAD, symptoms  of angina lately, chest pressure, heaviness, tightness, and shortness  of breath with exertion, referred for cardiac cath due to his  worsening symptoms that seemed to be relatively subacute. .        He subsequently underwent heart catheterization which revealed nonobstructive coronary artery disease and questionable lesion in the stented the LAD. He then underwent FFR of the LAD which did reveal a significant lesion and subtotal underwent PCI to the LAD. The following day he was symptom free. His cath site was stable. He was essentially discharged home stable condition.     Procedures: 2017  FFR of the LAD  PCI of the LAD    Code Status: Full Code     Consults:   none    Examination:  Vitals:/80   Pulse 57   Temp 97.8 °F (36.6 °C) (Oral)   Resp 16   Ht 5' 7\" (1.702 m)   Wt 150 lb 9.6 oz (68.3 kg)   SpO2 97%   BMI 23.59 kg/m²      24 hour intake/output:  Intake/Output Summary (Last 24 hours) at 17 0855  Last data filed at 17 0518   Gross per 24 hour   Intake             2013 ml   Output                0 ml   Net             2013 ml       General Appearance: alert and oriented to person, place and time, in no acute distress  Cardiovascular: normal rate, regular rhythm, normal S1 and S2, no murmurs, rubs, clicks, or gallops, distal pulses intact, no carotid bruits, no JVD  Pulmonary/Chest: clear to auscultation bilaterally- no wheezes, rales or rhonchi, normal air movement, no respiratory distress  Abdomen: soft, non-tender, non-distended, normal bowel sounds, no masses Extremities: no cyanosis, clubbing or edema, pulse     Skin: warm and dry, no rash or erythema  Head: normocephalic and atraumatic  Eyes: pupils equal, round, and reactive to light  Neck: supple and non-tender without mass, no thyromegaly   Musculoskeletal: normal range of motion, no joint swelling, deformity or tenderness  Neurological: alert, oriented, normal speech, no focal findings or movement disorder noted    Medications:  Current Discharge Medication List      START taking these medications    Details   ticagrelor (BRILINTA) 90 MG TABS tablet Take 1 tablet by mouth 2 times daily  Qty: 60 tablet, Refills: 3      nitroGLYCERIN (NITROSTAT) 0.4 MG SL tablet Place 1 tablet under the tongue every 5 minutes as needed for Chest pain up to max of 3 total doses. If no relief after 1 dose, call 911. Qty: 25 tablet, Refills: 3         CONTINUE these medications which have NOT CHANGED    Details   Armodafinil (NUVIGIL) 200 MG TABS Take by mouth daily      metoprolol succinate (TOPROL XL) 25 MG extended release tablet Take 1 tablet by mouth daily  Qty: 30 tablet, Refills: 6      ipratropium (ATROVENT) 0.06 % nasal spray 2 sprays by Nasal route daily as needed       cetirizine (ZYRTEC) 10 MG tablet Take 10 mg by mouth daily       atorvastatin (LIPITOR) 40 MG tablet TAKE 1 TABLET BY MOUTH ONE TIME A DAY   Qty: 30 tablet, Refills: 10    Associated Diagnoses: Hypercholesteremia      NONFORMULARY Macula pro, 1 tablet BID    Associated Diagnoses: 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   Qty: 15 g, Refills: 1    Associated Diagnoses: Lichen planus      B Complex Vitamins (VITAMIN-B COMPLEX PO) Take 1 tablet by mouth daily. Calcium Carbonate-Vitamin D (CALCIUM 500/VITAMIN D PO) Take 1 tablet by mouth daily. aspirin 81 MG chewable tablet Take 81 mg by mouth daily. amphetamine-dextroamphetamine (ADDERALL, 10MG,) 10 MG tablet Take 1 tablet by mouth 2 times daily . Qty: 60 tablet, Refills: 0             Significant Diagnostics:   Radiology: No results found.     Labs:   Recent Results (from the past 72 hour(s))   CBC    Collection Time: 11/02/17 11:09 AM   Result Value Ref Range    WBC 4.8 4.8 - 10.8 thou/mm3    RBC 5.28 4.70 - 6.10 mill/mm3    Hemoglobin 17.3 14.0 - 18.0 gm/dl    Hematocrit 49.9 42.0 - 52.0 %    MCV 94.5 (H) 80.0 - 94.0 fL    MCH 32.7 (H) 27.0 - 31.0 pg    MCHC 34.7 33.0 - 37.0 gm/dl    RDW 12.8 11.5 - 14.5 %    Platelets 114 692 - 474 thou/mm3    MPV 7.4 7.4 - 10.4 mcm   Basic metabolic panel    Collection Time: 11/02/17 11:09 AM   Result Value Ref Range    Sodium 145 135 - 145 meq/L    Potassium 4.6 3.5 - 5.2 meq/L    Chloride 105 98 - 111 meq/L    CO2 26 23 - 33 meq/L    Glucose 107 70 - 108 mg/dL    BUN 21 7 - 22 mg/dL    CREATININE 0.8 0.4 - 1.2 mg/dL    Calcium 8.9 8.5 - 10.5 mg/dL   Lipid panel    Collection Time: 11/02/17 11:09 AM   Result Value Ref Range    Cholesterol, Total 157 100 - 199 mg/dL    Triglycerides 84 0 - 199 mg/dL    HDL 61 mg/dL    LDL Calculated 79 mg/dL   TYPE AND SCREEN    Collection Time: 11/02/17 11:09 AM   Result Value Ref Range    ABO O     Rh Factor NEG     Antibody Screen NEG    Anion Gap    Collection Time: 11/02/17 11:09 AM   Result Value Ref Range    Anion Gap 14.0 8.0 - 16.0 meq/L   Glomerular Filtration Rate, Estimated    Collection Time: 11/02/17 11:09 AM   Result Value Ref Range    Est, Glom Filt Rate >90 ml/min/1.73m2   Electrocardiogram, 12-lead     Collection Time: 11/02/17 11:39 AM   Result Value Ref Range    Ventricular Rate 54 BPM    Atrial Rate 54 BPM    P-R Interval 188 ms    QRS Duration 88 ms    Q-T

## 2017-11-08 ENCOUNTER — TELEPHONE (OUTPATIENT)
Dept: PULMONOLOGY | Age: 67
End: 2017-11-08

## 2017-11-08 DIAGNOSIS — G47.419 PRIMARY NARCOLEPSY WITHOUT CATAPLEXY: Primary | ICD-10-CM

## 2017-11-08 RX ORDER — METHYLPHENIDATE HYDROCHLORIDE 10 MG/1
10 TABLET ORAL 2 TIMES DAILY
Qty: 60 TABLET | Refills: 0 | Status: SHIPPED | OUTPATIENT
Start: 2017-11-08 | End: 2017-12-08

## 2017-11-08 NOTE — TELEPHONE ENCOUNTER
Patient complains of increasing sob, worse since stent was put in, asking if he can switch to Plavix? Patient complains of fatigue and headaches since starting toprol taking 12.5 mg BID? Patient asking if he is okay to drive to [id number]?

## 2017-11-08 NOTE — TELEPHONE ENCOUNTER
Pt calling because he is currently on adoral and he is having bad side effects of it, and is wanting to go back onto ritalin. He is having SOB with the adoral and he said the ritalin did not give him that and he would like to try that again. Please advise.

## 2017-11-09 RX ORDER — CLOPIDOGREL BISULFATE 75 MG/1
75 TABLET ORAL DAILY
Qty: 30 TABLET | Refills: 3 | Status: SHIPPED | OUTPATIENT
Start: 2017-11-09 | End: 2018-02-08 | Stop reason: SDUPTHER

## 2017-11-09 NOTE — TELEPHONE ENCOUNTER
Spoke with patient. He verbalized understanding to stop Metoprolol and to stop Brilinta and RX pended for Plavix.

## 2017-11-10 ENCOUNTER — OFFICE VISIT (OUTPATIENT)
Dept: FAMILY MEDICINE CLINIC | Age: 67
End: 2017-11-10

## 2017-11-10 VITALS
RESPIRATION RATE: 14 BRPM | HEART RATE: 68 BPM | SYSTOLIC BLOOD PRESSURE: 114 MMHG | BODY MASS INDEX: 23.46 KG/M2 | WEIGHT: 149.5 LBS | HEIGHT: 67 IN | DIASTOLIC BLOOD PRESSURE: 64 MMHG

## 2017-11-10 DIAGNOSIS — I25.10 ASHD (ARTERIOSCLEROTIC HEART DISEASE): Primary | ICD-10-CM

## 2017-11-10 DIAGNOSIS — Z23 NEED FOR STREPTOCOCCUS PNEUMONIAE VACCINATION: ICD-10-CM

## 2017-11-10 DIAGNOSIS — E78.00 HYPERCHOLESTEREMIA: ICD-10-CM

## 2017-11-10 PROCEDURE — 90732 PPSV23 VACC 2 YRS+ SUBQ/IM: CPT | Performed by: FAMILY MEDICINE

## 2017-11-10 PROCEDURE — 99495 TRANSJ CARE MGMT MOD F2F 14D: CPT | Performed by: FAMILY MEDICINE

## 2017-11-10 PROCEDURE — G0009 ADMIN PNEUMOCOCCAL VACCINE: HCPCS | Performed by: FAMILY MEDICINE

## 2017-11-10 ASSESSMENT — ENCOUNTER SYMPTOMS
SORE THROAT: 0
CONSTIPATION: 0
BLOOD IN STOOL: 0
COUGH: 0
EYE PAIN: 0
BACK PAIN: 0
TROUBLE SWALLOWING: 0
ABDOMINAL PAIN: 0
SHORTNESS OF BREATH: 0
NAUSEA: 0
CHEST TIGHTNESS: 0

## 2017-11-10 NOTE — PROGRESS NOTES
Transition Care Office Visit    Date of Face-to-Face: 11/10/2017    Persons at visit: wife    Here for follow after hospitalization for: Coronary Artery Disease    Activity: activity as tolerated    Any medication changes since post-hospitalization? Yes   plavix     Any treatment changes since post-hospitalization? No    Any further education needed on medications/treatment plan? No    Current Medication List  Outpatient Encounter Prescriptions as of 11/10/2017   Medication Sig Dispense Refill    clopidogrel (PLAVIX) 75 MG tablet Take 1 tablet by mouth daily 30 tablet 3    methylphenidate (RITALIN) 10 MG tablet Take 1 tablet by mouth 2 times daily . 60 tablet 0    nitroGLYCERIN (NITROSTAT) 0.4 MG SL tablet Place 1 tablet under the tongue every 5 minutes as needed for Chest pain up to max of 3 total doses. If no relief after 1 dose, call 911. 25 tablet 3    ipratropium (ATROVENT) 0.06 % nasal spray 2 sprays by Nasal route daily as needed       cetirizine (ZYRTEC) 10 MG tablet Take 10 mg by mouth daily       atorvastatin (LIPITOR) 40 MG tablet TAKE 1 TABLET BY MOUTH ONE TIME A DAY  30 tablet 10    NONFORMULARY Macula pro, 1 tablet BID      fluocinonide (LIDEX) 0.05 % gel Apply topically 2 times daily. 15 g 1    B Complex Vitamins (VITAMIN-B COMPLEX PO) Take 1 tablet by mouth daily.  Calcium Carbonate-Vitamin D (CALCIUM 500/VITAMIN D PO) Take 1 tablet by mouth daily.  aspirin 81 MG chewable tablet Take 81 mg by mouth daily. No facility-administered encounter medications on file as of 11/10/2017. Medication Reconciliation  Provider has reviewed medication record and it has been modified as necessary to accurately depict current medications since hospitalization. Luisana Pacheco has been instructed on these changes.      HPI:     HPI       Off  toprol    But  On  plavix  As  Off and   Had  Stent placed         Dyspnea  At  times            has a current medication list which includes the following prescription(s): clopidogrel, methylphenidate, nitroglycerin, ipratropium, cetirizine, atorvastatin, NONFORMULARY, fluocinonide, b complex vitamins, calcium carbonate-vitamin d, and aspirin. No Known Allergies    Social History   Substance Use Topics    Smoking status: Former Smoker     Types: Cigarettes     Quit date: 7/2/2005    Smokeless tobacco: Never Used    Alcohol use Yes      Comment: social       Past Medical History:   Diagnosis Date    Actinic keratoses     Arthritis     hands    ASHD (arteriosclerotic heart disease)     Carotid disease, bilateral ([address] 2017    Colon polyps 5/07    Hypercholesteremia 7/97    Narcolepsy     Rotator cuff (capsule) sprain     Tobacco abuse     Unspecified sleep apnea        Past Surgical History:   Procedure Laterality Date    APPENDECTOMY      CATARACT REMOVAL Bilateral 2016    Right- Oct[facility]     COLONOSCOPY  2007,         CORONARY ANGIOPLASTY WITH STENT PLACEMENT  7/05    CORONARY ANGIOPLASTY WITH STENT PLACEMENT  11/2017    2 nd stent to LAD    HEMORRHOID SURGERY  09/2016    Dr Plaza Friday, every 2 weeks x 3 times    Lynnette Candelaria Left 2015    Dr Margarette Almanzar @ [facility]   rt 2015    TONSILLECTOMY         Family History   Problem Relation Age of Onset    Stroke Mother     Diabetes Mother     Heart Disease Father     Asthma Father        /64 (Site: Left Arm, Position: Sitting, Cuff Size: Medium Adult)   Pulse 68   Resp 14   Ht 5' 7\" (1.702 m)   Wt 149 lb 8 oz (67.8 kg)   BMI 23.42 kg/m²   Wt Readings from Last 3 Encounters:   11/10/17 149 lb 8 oz (67.8 kg)   11/03/17 150 lb 9.6 oz (68.3 kg)   10/27/17 153 lb 14.4 oz (69.8 kg)       Review of Systems:     Review of Systems   Constitutional: Negative for fatigue and fever. HENT: Negative for congestion, ear pain, postnasal drip, sore throat and trouble swallowing. Eyes: Negative for pain.    Respiratory: Negative for cough, chest tightness and shortness of breath. Cardiovascular: Negative for chest pain, palpitations and leg swelling. Gastrointestinal: Negative for abdominal pain, blood in stool, constipation and nausea. Genitourinary: Negative for difficulty urinating, frequency and urgency. Musculoskeletal: Negative for arthralgias, back pain, joint swelling and neck stiffness. Skin: Negative for rash. Neurological: Negative for dizziness, weakness and headaches. Hematological: Negative for adenopathy. Does not bruise/bleed easily. Psychiatric/Behavioral: Negative for behavioral problems, dysphoric mood and sleep disturbance. Exam:   Physical Exam   Constitutional: He is oriented to person, place, and time. He appears well-developed and well-nourished. HENT:   Head: Normocephalic and atraumatic. Right Ear: External ear normal.   Left Ear: External ear normal.   Nose: Nose normal.   Mouth/Throat: Oropharynx is clear and moist.   Eyes: Conjunctivae and EOM are normal. Pupils are equal, round, and reactive to light. Fundi nl   Neck: Normal range of motion. Neck supple. No thyromegaly present. Cardiovascular: Normal rate, regular rhythm, normal heart sounds and intact distal pulses. Pulmonary/Chest: Effort normal and breath sounds normal. He has no wheezes. He has no rales. Abdominal: Soft. Bowel sounds are normal. He exhibits no mass. There is no tenderness. Musculoskeletal: Normal range of motion. Lymphadenopathy:     He has no cervical adenopathy. Neurological: He is alert and oriented to person, place, and time. He has normal reflexes. No cranial nerve deficit. Skin: Skin is warm and dry. No rash noted. Psychiatric: He has a normal mood and affect. Nursing note and vitals reviewed. Assessment:     1. ASHD (arteriosclerotic heart disease)     2.  Need for Streptococcus pneumoniae vaccination  Pneumococcal polysaccharide vaccine 23-valent greater than or equal to 3yo subcutaneous/IM

## 2017-11-16 ENCOUNTER — HOSPITAL ENCOUNTER (OUTPATIENT)
Dept: CARDIAC REHAB | Age: 67
Setting detail: THERAPIES SERIES
Discharge: HOME | End: 2017-11-16
Payer: COMMERCIAL

## 2017-11-16 VITALS — WEIGHT: 148.5 LBS | HEIGHT: 67 IN | BODY MASS INDEX: 23.31 KG/M2

## 2017-11-16 PROCEDURE — 93798 PHYS/QHP OP CAR RHAB W/ECG: CPT

## 2017-11-16 NOTE — PLAN OF CARE
Hospital Facility-Based Program  [facility]/Traditional Cardiac Rehab  PHYSICIAN ORDER  Class I Level B based on research  Medical Director:  Dr. Wong Roach, Cardiologist, [address]    Patient Name: David Saenz : 1950  Referring Physician: Dr. Moncho Adam  Date: 2017  Allergies: Allergies as of 2017    (No Known Allergies)        Diagnosis:  PCi to LAD on 17    [] [facility] with telemetry monitoring, resting and exercise        BPs & HRs with each session. Hospital setting for patient safety. [] 72 sessions: 36 exercise sessions, 36 education sessions   [] 36 sessions: 18 exercise sessions, 18 education sessions  [x] Traditional Cardiac Rehab with telemetry monitoring, resting and exercise BPs &       HRs with each session. Hospital setting for patient safety. [x] 36 sessions:  36 exercise sessions     Per Patient symptoms, proceed with:   [x]Nitroglycerine 0.4mg SL every 5 minutes prn, maximum of 3, for chest pain   [x]12-lead EKG for symptoms of chest pain or noted change in heart rhythm   [x]Administer O2 per nasal cannula for symptoms of chest pain or acute dyspnea    Physician Prescribed Exercise:  Plan of Care:  Patient to attend exercise sessions with aerobic endurance and strength training for a total of 31-60 min/day, 3 days/week with supplemented 30+ minutes of aerobic exercise at home on days not participating in Cardiac Rehab. Aerobic Endurance Training  Aerobic Endurance mode (TM, AD, NS) starting at 8 minutes progressing by 2-3 minutes each week to a total of 15-30 minutes 2-3x/week. Arms only 5 min  Stair step increasing to 2 min  Resistance/strength training:  Hand weights starting at 1-5 lbs increasing in weight by 1-2 lbs and/or per patient tolerance weekly. Start with 8 repetitions and increase the repetitions each exercise session per patient tolerance for a total of 15 repetitions.   Measurable Endurance Goal:  Aerobic endurance goal to be measured in minutes. Start endurance training per patient tolerance at 1-8 minutes per exercise type, progressing to a total of 31-60 minutes using various modes of training (see Exercise Prescription). Progression:    [x] Weekly 5-10% intensity progression, as tolerated, during cardiac rehab sessions. [x] 13-17 Rate of Perceived Exertion on the Delia Scale (6-20). Measurable Muscular Strength Goal:  Starting at 1-5 lbs x 8 reps, progressing to 5-25 lbs x 15 reps per patient tolerance.       Electronically signed by Adrian Shaikh on 11/16/2017 at 11:36 AM    Exercise Physiologist/Date/Time

## 2017-11-16 NOTE — PLAN OF CARE
[facility] Facility-Based Program  Individualized Cardiac Treatment Plan    Patient Name:  Roldan Piña  :  1950  Age:  78 y.o. MRN: [id number]  Diagnosis: PCI to LAD  Date of Event: 17   Physician:  Dr. Prakash Ceja  Next Office Visit:  17  Date Entered Program: 17  Risk Stratifications: [x] Low [] Intermediate [] High  Allergies: No Known Allergies    Individual Cardiac Treatment Plan EXERCISE  INITIAL 30 DAY 60 DAY 90 DAY FINAL DAY   EXERCISE  ASSESSMENT/PLAN EXERCISE  REASSESSMENT EXERCISE   REASSESSMENT EXERCISE   REASSESSMENT EXERCISE  DISCHARGE/FOLLOW-UP   Date: 17 Date: Date: Date: Date:   Session #1 Session # Session # Session # Session #   Stages of Change Stages of Change Stages of Change Stages of Change Stages of Change   [] Pre Contemplation  [] Contemplation  [] Preparation  [x] Action  [] Maintenance  [] Relapse [] Pre Contemplation  [] Contemplation  [] Preparation  [] Action  [] Maintenance  [] Relapse [] Pre Contemplation  [] Contemplation  [] Preparation  [] Action  [] Maintenance  [] Relapse [] Pre Contemplation  [] Contemplation  [] Preparation  [] Action  [] Maintenance  [] Relapse [] Pre Contemplation  [] Contemplation  [] Preparation  [] Action  [] Maintenance  [] Relapse   EXERCISE ASSESSMENT EXERCISE ASSESSMENT EXERCISE ASSESSMENT EXERCISE ASSESSMENT EXERCISE ASSESSMENT   6 Min Walk Test  Distance walked:   0.30 miles  1584 ft  3.30 METs  Max HR:120 BPM      RPE:  15    THR:  112-137  Rhythm:  NSR    6 Min Walk Test  Distance walked:   ** miles  ** ft  ** METs  Max HR:** BPM      RPE:  **    %Changeft = **  Rhythm:  **   DASI: 6.70 METS DASI: ** METS DASI: ** METS DASI: ** METS DASI: ** METS   Return to Work  [employer] on returning to work?    [] Yes              [] No   [] Disabled     [x] Retired         Orthopedic Limitations/  [x] Yes    [] No  If yes please list:  Rotator cuff surgery on 2x/wk    Wt: **#       Reps:  **    *Increase wt. after completing 15 reps with an RPE of <12/13. [] Yes      [] No  Upper and Lower body strength training 2x/wk    Wt: **#       Reps:  **    *Increase wt. after completing 15 reps with an RPE of <12/13. [] Yes      [] No  Upper and Lower body strength training 2x/wk    Wt: **#       Reps:  **    *Increase wt. after completing 15 reps with an RPE of <12/13. Continue Strength Training at home   [] Exercise Log & Strength training handout given     Wt: **#       Reps:  **    *Increase wt. after completing 15 reps with an RPE of <12/13. Flexibility Flexibility Flexibility Flexibility Flexibility   [x] Yes      [] No  25 min session of Core Strength & Flexibility 1x/per week  Attends Core Strength & Flexibility   [] Yes      [] No Attends Core Strength & Flexibility   [] Yes      [] No Attends Core Strength & Flexibility   [] Yes      [] No Continue Core Strength & Flexibility at home   Home Exercise  *Katy Smith verbalizes planning to restart home fitness walking once PF clears up Home Exercise  *Bhanu verbalizes planning to ** ** days/week for ** min on days not in rehab. Home Exercise  *Bhanu verbalizes planning to ** ** days/week for ** min on days not in rehab. Home Exercise  *Bhanu verbalizes planning to ** ** days/week for ** min on days not in rehab.  Home Exercise  *Elle Llanos his/her plan to ** ** days/week for ** min @ **   *Education* *Education* *Education* *Education* *Education*   RPE Scale  [x] Yes      [] No  Exercise Safety  [x] Yes      [] No  Equipment Orientation  [x] Yes      [] No  S/S to Report  [x] Yes      [] No  Warm Up/Cool Down  [x] Yes      [] No  Physically Active  [x] Yes      [] No    All Exercise Education Completed  [] Yes      [] No   *Goals* *Goals* *Goals* *Goals* *Nata Barrios plans to:  [x] Attend exercise sessions 3x/wk  [x] initiate home exercise 2-3x/wk for 10-20 min  [x] Increase 6 min walk distance by 10%  [] Maintenance  [] Relapse   NUTRITION ASSESSMENT NUTRITION ASSESSMENT NUTRITION ASSESSMENT NUTRITION ASSESSMENT NUTRITION ASSESSMENT   Weight Management  Weight: 148.8        Height: 5'7   BMI: 23.3  Weight Management  Weight: **                  Weight Management  Weight: **                  Weight Management  Weight: ** Weight Management  Weight: **                  BMI: **   Eating Plan  Current eating habits: no caffeine, 2% milk Eating Plan  Changes: Eating Plan  Changes: Eating Plan  Changes: Eating Plan Improvements:   Alcohol Use  [] none          [x] daily  [] weekly      [] special   Type: beer  Amount: one to two       Diet Assessment Tool:  RATE YOUR PLATE  *Given to patient to complete and return. Diet Assessment Tool:    Score: **/69      Diet Assessment Tool: RATE YOUR PLATE  Score: **/71   NUTRITION PLAN NUTRITION PLAN NUTRITION PLAN NUTRITION PLAN NUTRITION PLAN   *Interventions* *Interventions* *Interventions* *Interventions* *Interventions*   Initial Survey given Goal Setting Discussion:   [] Yes      [] No       Follow Up Survey Reviewed & Goals Updated:     Professional Referral  Please check if needed. []Dietician Consult   []Wt. Management Referral  []Other:  Professional Referral  Please check if needed. []Dietician Consult   []Wt. Management Referral  []Other:  Professional Referral  Please check if needed. []Dietician Consult   []Wt. Management Referral  []Other:  Professional Referral  Please check if needed. []Dietician Consult   []Wt. Management Referral  []Other: Professional Referral  Please check if needed. []Dietician Consult   []Wt. Management Referral  []Other:    *Education* *Education* *Education* *Education* *Education*   Nutritional Education Recommended     Nutritional Education Attended/Date Nutritional Education Attended/Date Nutritional Education Attended/Date All Sessions Completed?     [] Yes  [] No   *Goals* *Goals* *Goals* *Goals* *Goals*   Bhanu's nutritional goals are as follows:  Complete and return diet survey [] Attend 1:1      [] Attend 1:1  [] Complete and return diet survey  Kemi Garcia achieved nutritional goals   [] Yes    [] No  If no, why? Individual Cardiac Treatment Plan  Psychosocial  PSYCHOSOCIAL  ASSESSMENT/PLAN PSYCHOSOCIAL  REASSESSMENT PSYCHOSOCIAL   REASSESSMENT PSYCHOSOCIAL   REASSESSMENT PSYCHOSOCIAL  DISCHARGE/FOLLOW-UP   Stages of Change Stages of Change Stages of Change Stages of Change Stages of Change   [] Pre Contemplation  [] Contemplation  [] Preparation  [] Action  [x] Maintenance  [] Relapse [] Pre Contemplation  [] Contemplation  [] Preparation  [] Action  [] Maintenance  [] Relapse [] Pre Contemplation  [] Contemplation  [] Preparation  [] Action  [] Maintenance  [] Relapse [] Pre Contemplation  [] Contemplation  [] Preparation  [] Action  [] Maintenance  [] Relapse [] Pre Contemplation  [] Contemplation  [] Preparation  [] Action  [] Maintenance  [] Relapse   PSYCHOSOCIAL ASSESSMENT PSYCHOSOCIAL ASSESSMENT PSYCHOSOCIAL ASSESSMENT PSYCHOSOCIAL ASSESSMENT PSYCHOSOCIAL ASSESSMENT   Behavioral Outcomes Behavioral Outcomes Behavioral Outcomes Behavioral Outcomes Behavioral Outcomes   Tool Used:  Ferny & Lira, Quality of Life Index, Cardiac Version IV  *Given to patient to complete. Tool Used:    Ferny & Pankaj, Quality of Life Index, Cardiac Version IV   QOL Index Score: **    HF:**  S&E:**  P&S: **  Family: **   Tool Used:     Ferny & Lira, Quality of Life Index, Cardiac Version IV  QOL Index Score: **    HF:**  S&E:**  P&S: **  Family: **   PHQ-9 score 0  Depression Severity  [x]Minimal  []Mild   []Moderate  []Moderately Severe  []Severe    PHQ-9 score **  Depression Severity  []Minimal  []Mild   []Moderate  []Moderately Severe   []Severe   Does patient have Family Support?   [x] Yes      [] No  No signs of marital/family distress       Using a scale of 0-10, 0=none, 10=very:   Rate your depression: 0  Rate your anxiety:  0  Using a scale of 0-10, 0=none, 10=very:   Rate your depression: **  Rate your anxiety:  ** Using a scale of 0-10, 0=none, 10=very:   Rate your depression: **  Rate your anxiety:  ** Using a scale of 0-10, 0=none, 10=very:   Rate your depression: **  Rate your anxiety:  ** Using a scale of 0-10, 0=none, 10=very:   Rate your depression: **  Rate your anxiety:  **   Signs and Symptoms of Depression Present? [] Yes      [x] No Signs and Symptoms of Depression Present? [] Yes      [] No  If yes, please explain:  ** Signs and Symptoms of Depression Present? [] Yes      [] No  If yes, please explain:  ** Signs and Symptoms of Depression Present? [] Yes      [] No  If yes, please explain:  ** Signs and Symptoms of Depression Present? [] Yes      [] No  If yes, please explain:  **   Signs and Symptoms of Anxiety Present? [] Yes      [x] No Signs and Symptoms of Anxiety Present? [] Yes      [] No  If yes, please explain:  ** Signs and Symptoms of Anxiety Present? [] Yes      [] No  If yes, please explain:  ** Signs and Symptoms of Anxiety Present? [] Yes      [] No  If yes, please explain:  ** Signs and Symptoms of Anxiety Present? [] Yes      [] No  If yes, please explain:  **   [] Patient has poor eye contact   [] Flat affect present. [] Signs of anxiety, anger or hostility    [] Signs social isolation present.    []  Signs of alcohol or substance abuse       PSYCHOSOCIAL PLAN PSYCHOSOCIAL PLAN PSYCHOSOCIAL PLAN PSYCHOSOCIAL PLAN PSYCHOSOCIAL PLAN   *Interventions* *Interventions* *Interventions* *Interventions* *Interventions*   *Please check if needed  [] Psych Consult  [] Physician Referral  [x] Stress Management Skills *Please check if needed  [] Psych Consult  [] Physician Referral  [] Stress Management Skills *Please check if needed  [] Psych Consult  [] Physician Referral  [] Stress Management Skills *Please check if needed  [] Psych Consult  [] Physician Referral  [] Stress Management Skills *Please check if needed  [] Psych Consult  [] Physician Referral  [] Stress Management Skills   Is patient currently taking anti-depressant or anti-anxiety medications? [] Yes      [x] No Change in anti-depressant or anti-anxiety medications? [] Yes      [] No  If yes, please list medications:  ** Change in anti-depressant or anti-anxiety medications? [] Yes      [] No  If yes, please list medications:  ** Change in anti-depressant or anti-anxiety medications? [] Yes      [] No  If yes, please list medications:  ** Change in anti-depressant or anti-anxiety medications? [] Yes      [] No  If yes, please list medications:  **   *Education* *Education* *Education* *Education* *Education*   See Education Videos under Risk Factors       *Goals* *Goals* *Goals* *Goals* *Goals*   Bhanu's psychosocial goals are as follows:  Complete HADS & Ferny & Pankaj, Quality of Life Index, Cardiac Version IV [] Reduce depression symptom severity by 1 level [] Reduce depression symptom severity by 1 level [] Reduce depression symptom severity by 1 level Abena Bailey achieved psychosocial goals?   [] Yes    [] No  If no, why?  **  [] Use methods learned to continue to reduce depression symptom severity by 1 level     Individual Cardiac Treatment Plan  Other:  Risk Factor/Education  RISK FACTOR  ASSESSMENT/PLAN RISK FACTOR  REASSESSMENT  RISK FACTOR  REASSESSMENT RISK FACTOR  REASSESSMENT RISK FACTOR   DISCHARGE/FOLLOW-UP   Stages of Change Stages of Change Stages of Change Stages of Change Stages of Change   [] Pre Contemplation  [] Contemplation  [] Preparation  [] Action  [x] Maintenance  [] Relapse [] Pre Contemplation  [] Contemplation  [] Preparation  [] Action  [] Maintenance  [] Relapse [] Pre Contemplation  [] Contemplation  [] Preparation  [] Action  [] Maintenance  [] Relapse [] Pre Contemplation  [] Contemplation  [] Preparation  [] Action  [] Maintenance  [] Relapse [] Pre Contemplation  [] Contemplation  [] Preparation  [] Action  [] Maintenance  [] Relapse   RISK FACTOR/EDUCATION ASSESSMENT RISK FACTOR/EDUCATION ASSESSMENT RISK FACTOR/EDUCATION ASSESSMENT RISK FACTOR/EDUCATION ASSESSMENT RISK FACTOR /EDUCATION ASSESSMENT   Hypertension  [] Yes      [x] No    Resting BP: 140/80  Peak Ex BP:162/82   Hypertension  Resting BP: **  Peak Ex BP:**  Medication Changes:  [] Yes      [] No Hypertension  Resting BP: **  Peak Ex BP:**  Medication Changes:  [] Yes      [] No Hypertension  Resting BP: **  Peak Ex BP:**  Medication Changes:  [] Yes      [] No Hypertension  Resting BP: **  Peak Ex BP:**  Medication Changes:  [] Yes      [] No   Lipids  HLD/DLD  [x] Yes      [] No  TOTAL CHOL: 157  HDL:  61  LDL:  79  TRI  Medication: Lipitor Lipids  Medication Changes:  [] Yes      [] No     Lipids  Medication Changes:  [] Yes      [] No     Lipids  Medication Changes:  [] Yes      [] No     Lipids    TOTAL CHOL: **  HDL:  **  LDL:  **  TRIG:  **  Medication Changes:  [] Yes      [] No   Diabetes  [] Yes      [x] No   Diabetes  Most Recent BS:  BS have been in range  [] Yes      [] No  Medication Changes  [] Yes      [] No   Diabetes  Most Recent BS:  BS have been in range  [] Yes      [] No  Medication Changes  [] Yes      [] No     Diabetes  Most Recent BS:  BS have been in range  [] Yes      [] No  Medication Changes  [] Yes      [] No     Diabetes  Most Recent BS:  BS have been in range  [] Yes      [] No  Medication Changes  [] Yes      [] No       Tobacco Use  [] Current  [x] Former  [] Never    Years smoked: 36    Date Quit:     Smokeless Tobacco use:   [] Yes      [x] No Tobacco Use  Change in smoking status   [] Yes      [] No    Quit date: **   Tobacco Use  Change in smoking status   [] Yes      [] No    Quit date: **   Tobacco Use  Change in smoking status   [] Yes      [] No    Quit date: ** Tobacco Use  Change in smoking status   [] Yes      [] No    Quit date: **            Learning Barriers  Please select one:  []Speech  []Literacy  []Hearing  []Cognitive  []Vision  []Ready to Learn  Declined all education       RISK FACTOR/EDUCATION PLAN RISK FACTOR/EDUCATION PLAN RISK FACTOR/EDUCATION PLAN RISK FACTOR/EDUCATION PLAN RISK FACTOR/EDUCATION PLAN   *Interventions* *Interventions* *Interventions* *Interventions* *Interventions*   Recommended Educational Videos  Declined  [x] Heart Disease Risk Reduction  [x] Overview of The [facility] Eating Plan  [x] Move It! [x] Healthy Minds, Bodies, Hearts       Completed Videos       Completed Videos Completed Videos Recommended Educational Videos Completed    [] Yes      [] No    **If not completed, Why? **          Smoking Cessation/Relaspe Prevention Intervention needed? [] Yes      [x] No       Professional Referrals:  *Please check if needed  [] Diabetes Clinic  [] Lipid Clinic   [] Other:     Professional Referrals:  *Please check if needed  [] Diabetes Clinic  [] Lipid Clinic   [] Other:   Preventative Medication Preventative Medication Preventative Medication Preventative Medication Preventative Medication   Aspirin  [x] Yes    [] No  Blood Thinner: Clopidogrel/Effient/Brillinta  [x] Yes    [] No  Beta Blocker  [] Yes    [x] No  Ace Inhibitor  [] Yes    [x] No  Statin/Lipid Lowering  [x] Yes    [] No Medication Changes? [] Yes    [] No Medication Changes? [] Yes    [] No Medication Changes? [] Yes    [] No Medication Changes? [] Yes    [] No   *Education* *Education* *Education* *Education* *Education*   Does Juan Ban require any additional education? [] Yes    0. [x] No   Does Juan Ban require any additional education? [] Yes    [] No Does Juan Ban require any additional education? [] Yes    [] No Does Juan Ban require any additional education? [] Yes    [] No Does Juan Ban require any additional education?   [] Yes    [] No   Recommended Additional Educational Videos    Medical  [] Diseases of Our Time-Part 1  [] Diseases of Our Times-Part 2  [] Metabolic Syndrome & Monitored telemetry has revealed NSR  [] documented arrhythmia at increasing workloads  [] associated symptoms   Monitored telemetry has revealed **  [] documented arrhythmia at increasing workloads  [] associated symptoms ** Monitored telemetry has revealed  [] documented arrhythmia at increasing workloads  [] associated symptoms ** Monitored telemetry has revealed **  [] documented arrhythmia at increasing workloads  [] associated symptoms ** Monitored telemetry has revealed **  [] documented arrhythmia at increasing workloads  [] associated symptoms **   Physician Response    [x] Cardiac rehab is reasonably and medically necessary for continuous cardiac monitoring surveillance  of patient's cardiac activity  [x] Initiate continuous telemerty monitoring and notify me with any concerns  [] Other   Physician Response    [x] Cardiac rehab is reasonably and medically necessary for continuous cardiac monitoring surveillance  of patient's cardiac activity  [x] Continue continuous telemerty monitoring and notify me with any concerns  [] Other     Physician Response      [x] Cardiac rehab is reasonably and medically necessary for continuous cardiac monitoring surveillance  of patient's cardiac activity  [x] Continue continuous telemerty monitoring and notify me with any concerns   [] Other     Physician Response    [x] Cardiac rehab is reasonably and medically necessary for continuous cardiac monitoring surveillance  of patient's cardiac activity  [x] Continue continuous telemerty monitoring and notify me with any concerns   [] Other      Electronically signed by Tiffany Fischer on 11/16/2017 at 11:33 AM    Rehab Staff Signature **  Rehab Staff Signature **  Rehab Staff   Signature **  Rehab Staff   Signature **  Rehab Staff Signature

## 2017-11-20 ENCOUNTER — HOSPITAL ENCOUNTER (OUTPATIENT)
Dept: CARDIAC REHAB | Age: 67
Setting detail: THERAPIES SERIES
Discharge: HOME | End: 2017-11-20
Payer: COMMERCIAL

## 2017-11-20 ENCOUNTER — APPOINTMENT (OUTPATIENT)
Dept: CARDIAC REHAB | Age: 67
End: 2017-11-20
Payer: COMMERCIAL

## 2017-11-22 ENCOUNTER — HOSPITAL ENCOUNTER (OUTPATIENT)
Dept: CARDIAC REHAB | Age: 67
Setting detail: THERAPIES SERIES
Discharge: HOME | End: 2017-11-22
Payer: COMMERCIAL

## 2017-11-22 PROCEDURE — 93798 PHYS/QHP OP CAR RHAB W/ECG: CPT

## 2017-11-24 ENCOUNTER — APPOINTMENT (OUTPATIENT)
Dept: CARDIAC REHAB | Age: 67
End: 2017-11-24
Payer: COMMERCIAL

## 2017-11-27 ENCOUNTER — HOSPITAL ENCOUNTER (OUTPATIENT)
Dept: CARDIAC REHAB | Age: 67
Setting detail: THERAPIES SERIES
Discharge: HOME | End: 2017-11-27
Payer: COMMERCIAL

## 2017-11-27 ENCOUNTER — OFFICE VISIT (OUTPATIENT)
Dept: CARDIOLOGY CLINIC | Age: 67
End: 2017-11-27
Payer: COMMERCIAL

## 2017-11-27 VITALS
SYSTOLIC BLOOD PRESSURE: 114 MMHG | WEIGHT: 147.2 LBS | BODY MASS INDEX: 23.1 KG/M2 | HEIGHT: 67 IN | DIASTOLIC BLOOD PRESSURE: 86 MMHG | HEART RATE: 65 BPM

## 2017-11-27 DIAGNOSIS — E78.01 FAMILIAL HYPERCHOLESTEROLEMIA: ICD-10-CM

## 2017-11-27 DIAGNOSIS — I10 ESSENTIAL HYPERTENSION: ICD-10-CM

## 2017-11-27 DIAGNOSIS — I25.10 ASHD (ARTERIOSCLEROTIC HEART DISEASE): Primary | ICD-10-CM

## 2017-11-27 PROCEDURE — 99213 OFFICE O/P EST LOW 20 MIN: CPT | Performed by: NUCLEAR MEDICINE

## 2017-11-27 PROCEDURE — 93000 ELECTROCARDIOGRAM COMPLETE: CPT | Performed by: NUCLEAR MEDICINE

## 2017-11-27 PROCEDURE — 93798 PHYS/QHP OP CAR RHAB W/ECG: CPT

## 2017-11-27 NOTE — PROGRESS NOTES
[facility] PROFESSIONAL SERVS  [facility]  [address]  [address]  [facility] [address]  Dept: [phone]  Dept Fax: [phone]  Loc: [phone]    Visit Date: 11/27/2017    Azam Curry is a 78 y.o. male who presents today for:  Chief Complaint   Patient presents with    Follow-Up from Hospital     heart cath with stent.      Coronary Artery Disease    Hypertension   had stent to the LAD in stent restenosis  Feels better  Improved   No chest pain  BP is stable  Didn't do well with Berlinta  Active       HPI:  HPI  Past Medical History:   Diagnosis Date    Actinic keratoses     Arthritis     hands    ASHD (arteriosclerotic heart disease)     Carotid disease, bilateral ([address] 2017    Colon polyps 5/07    Hypercholesteremia 7/97    Narcolepsy     Rotator cuff (capsule) sprain     Tobacco abuse     Unspecified sleep apnea       Past Surgical History:   Procedure Laterality Date    APPENDECTOMY      CATARACT REMOVAL Bilateral 2016    Right- Oct, [facility]- [facility]- [facility]     COLONOSCOPY  2007,         CORONARY ANGIOPLASTY WITH STENT PLACEMENT  7/05    CORONARY ANGIOPLASTY WITH STENT PLACEMENT  11/2017    2 nd stent to LAD    HEMORRHOID SURGERY  09/2016    Dr Tonya West, every 2 weeks x 3 times     Greer Sonal Left 2015    Dr Melchor Salcedo @ [facility]   rt 2015    TONSILLECTOMY       Family History   Problem Relation Age of Onset    Stroke Mother     Diabetes Mother     Heart Disease Father     Asthma Father      Social History   Substance Use Topics    Smoking status: Former Smoker     Types: Cigarettes     Quit date: 7/2/2005    Smokeless tobacco: Never Used    Alcohol use Yes      Comment: social      Current Outpatient Prescriptions   Medication Sig Dispense Refill    clopidogrel (PLAVIX) 75 MG tablet Take 1 tablet by mouth daily 30 tablet 3    nitroGLYCERIN (NITROSTAT) 0.4 MG SL tablet Place 1 tablet under the tongue every 5 minutes as

## 2017-11-27 NOTE — PROGRESS NOTES
Patient here for follow up from heart cath with stent. EKG done today. Patient denies SOB. Chest pain. Palpitations. CAITY.

## 2017-11-27 NOTE — PROGRESS NOTES
Hospital Facility-Based Program  Phase 2 Cardiac Rehab Weekly Progress Report      Patient prescribed exercise:  2:00 class. 3 times per week in rehab, 1-4 times per week at home for the amount of sessions/weeks specified by insurance. Current Levels: Treadmill: 3. 2mph/0% for 8 minutes, Schwinn Airdyne: Level 1.3 for 8 minutes, UBE: Level 0.7 for 5 minutes. Progression Discussion: Increase Aerobic exercise 15 minutes to work on endurance. Attempt to increase intensity by 5-20% for each modality this week. Try to increase intensities until Dot Rank rates the exercises a 13-17 on Delia RPE.

## 2017-11-27 NOTE — PROGRESS NOTES
Pt arrived for exercise. Stated that he went to his follow up with Dr. Michel Irvin and was told that he did not need to attend Cardiac Rehab if he did not want to. Pt states that this will be his last day.  Pt will be discharged from program.

## 2017-11-27 NOTE — PLAN OF CARE
[facility] Program  Individualized Cardiac Treatment Plan    Patient Name:  Beck Lisa  :  1950  Age:  78 y.o. MRN: [id number]  Diagnosis: PCI to LAD  Date of Event: 17   Physician:  Dr. Courtney Long  Next Office Visit:  17  Date Entered Program: 17  Risk Stratifications: [x] Low [] Intermediate [] High  Allergies: No Known Allergies      NOTE: Patient informed staff that he no longer would like to attend Cardiac Rehab and his physician is aware. Pt will be discharged. Individual Cardiac Treatment Plan EXERCISE  INITIAL 30 DAY 60 DAY 90 DAY FINAL DAY   EXERCISE  ASSESSMENT/PLAN EXERCISE  REASSESSMENT EXERCISE   REASSESSMENT EXERCISE   REASSESSMENT EXERCISE  DISCHARGE/FOLLOW-UP   Date: 17 Date: Date: Date: Date: 17   Session #1 Session # Session # Session # Session #3   Stages of Change Stages of Change Stages of Change Stages of Change Stages of Change   [] Pre Contemplation  [] Contemplation  [] Preparation  [x] Action  [] Maintenance  [] Relapse [] Pre Contemplation  [] Contemplation  [] Preparation  [] Action  [] Maintenance  [] Relapse [] Pre Contemplation  [] Contemplation  [] Preparation  [] Action  [] Maintenance  [] Relapse [] Pre Contemplation  [] Contemplation  [] Preparation  [] Action  [] Maintenance  [] Relapse [] Pre Contemplation  [] Contemplation  [] Preparation  [] Action  [] Maintenance  [] Relapse   EXERCISE ASSESSMENT EXERCISE ASSESSMENT EXERCISE ASSESSMENT EXERCISE ASSESSMENT EXERCISE ASSESSMENT   6 Min Walk Test  Distance walked:   0.30 miles  1584 ft  3.30 METs  Max HR:120 BPM      RPE:  15    THR:  112-137  Rhythm:  NSR    6 Min Walk Test  Distance walked:   ** miles  ** ft  ** METs  Max HR:** BPM      RPE:  **    %Changeft = **  Rhythm:  **   DASI: 6.70 METS DASI: ** METS DASI: ** METS DASI: ** METS DASI: ** METS   Return to Work  [employer] on returning to work?    [] Yes              [] No   [] Disabled     [x] Retired         Orthopedic Limitations/  [x] Yes    [] No  If yes please list:  Rotator cuff surgery on both shoulders, plantar facitis     Orthopedic Limitations  *If patient has orthopedic issue:   Actions/  accomodations needed to make Dorna Butts successful : Orthopedic Limitations   Orthopedic Limitations   Orthopedic Limitations     Fall Risk  Fall risk assessed? [x] Yes      [] No    Balance Issues? [] Yes      [x] No     [] Walker [] Cane    [x] Safety issues reviewed      Fall Risk  *If patient is a fall risk, action needed to accommodate:  Fall Risk Fall Risk Fall Risk   Home Exercise  [] Yes    [x] No  Due to PF Home Exercise  [] Yes    [] No  Type: **  Frequency:**  Duration: ** Home Exercise  [] Yes    [] No  Type: **  Frequency: **  Duration: ** Home Exercise  [] Yes    [] No  Type: **  Frequency: **  Duration: ** Home Exercise  [] Yes    [] No  Type: **  Frequency: **  Duration: **   Angina with Activity? [] Yes    [x] No  Angina Management: na Angina with Activity? [] Yes    [] No  Angina Management: ** Angina with Activity? [] Yes    [] No  Angina Management: ** Angina with Activity? [] Yes    [] No  Angina Management: ** Angina with Activity?   [] Yes    [] No  Angina Management: **   EXERCISE PLAN EXERCISE PLAN EXERCISE PLAN EXERCISE PLAN EXERCISE PLAN   *Interventions* *Interventions* *Interventions* *Interventions* *Interventions*   Exercise Prescription  (per physician & CR staff) Exercise Prescription  (per physician & CR staff) Exercise Prescription  (per physician & CR staff) Exercise Prescription  (per physician & CR staff) Exercise Prescription  (per physician & CR staff)   Cardiovascular Cardiovascular Cardiovascular Cardiovascular Cardiovascular   Mode:    [x] Treadmill (TM)  [x] Schwinn Airdyne (AD)  [x] Arms Ergometer (AE)  [] NuStep  [] Elliptical (E) MODE:    [] Treadmill (TM)  [] Schwinn Airdyne (AD)  [] Arms Ergometer (AE)  [] NuStep  [] Elliptical (E) MODE:    [] Treadmill (TM)  [] Schwinn Airdyne (AD)  [] Arms Ergometer (AE)  [] NuStep  [] Elliptical (E) MODE:    [] Treadmill (TM)  [] Schwinn Airdyne (AD)  [] Arms Ergometer (AE)  [] NuStep  [] Elliptical (E) MODE:    [] Treadmill (TM)  [] Schwinn Airdyne (AD)  [] Arms Ergometer (AE)  [] NuStep  [] Elliptical (E)   Initial Workloads  TM: [email or web address] 3.3 METs  AD: 1.1 level = 3.3 METs  NS: 74  Santos= 3.3 METs  AE: 0.7 level = 2.5 METs Current Workloads  TM: @ %=  METs  AD:  level =  METs  NS:  Santos=  METs  AE:  level =  METs Current Workloads  TM: @ %=  METs  AD:  level =  METs  NS:  Santos=  METs  AE:  level =  METs Current Workloads  TM: @ %=  METs  AD:  level =  METs  NS:  Santos=  METs  AE:  level =  METs Current Workloads  TM: @ %=  METs  AD:  level =  METs  NS:  Santos=  METs  AE:  level =  METs   Frequency:    ICR: 3x/week  Home: 2-3x/wk Frequency:   ICR: 3x/week  Home: 3x/wk Frequency:  ICR: 3x/week  Home: 3-4x/wk Frequency:  ICR: 3x/week  Home: 3-4x/wk Frequency:  [facility] will continue exercise at  5-7 days/week   Duration:   Total aerobic exercise = 25-30 min    8 min/mode Duration:  Total aerobic exercises = ** min     **min/mode Duration:  Total aerobic exercises = ** min     **min/mode Duration:  Total aerobic exercises = ** min     **min/mode Duration:  Total erobic exercise =  60-90 min   Intensity:   MET Level = 3.3  RPE = 12-15 Intensity:  Max MET Level = **  RPE = 12-15 Intensity:  Max MET Level = **  RPE = 12-15 Intensity:  Max MET Level = **  RPE = 12-15 Intensity:  Max MET Level = ** RPE = 12-15   Progression: increase aerobic activity up to 15 min over next 4 weeks by increasing time 2-3 min/week.  Progression:   Progression:   Progression: Progression:  Increase time/intensity when RPE <13, and HR is in [facility]   [x] Yes      [] No  Upper and Lower body strength training *Marco Antonio Devine plans to:  [x] Attend exercise sessions 3x/wk  [x] initiate home exercise 2-3x/wk for 10-20 min  [x] Increase 6 min walk distance by 10%  [] Rivera Gr plans to:  [] Attend exercise sessions 3x/wk  [] continue home exercise 2-3x/wk for 20-30 min  [] ** Bhanu's plans to:  [x] Attend exercise sessions 3x/wk  [x] continue home exercise 2-3x/wk for 30-45 min  [x] Determine plan of exercise following rehab  [] ** Bhanu plans to:  [] Attend exercise sessions 3x/wk  [] continue home exercise 2-3x/wk for 20-30 min  [] ** Bhanu achieved exercise goals? Yes    [] No  If no, why?  **  [] Increased 6 min walk distance by 10%  [] Currently exercising 30-60 min/day, 5-7days/wk   [] Plans to continue exercise on own  [] Plans to join a local fitness center to continue exercise  [] Does not plan to continue to exercise after rehab   Return to ADL or Hobbies:  Aleshia Davalos is able to do anything he wants.  Return to ADL or Hobbies:  Aleshia Davalos would like to improve strength and endurance so he/she is able to return to ** Return to ADL or Hobbies:  Aleshia Davalos would like to improve strength and endurance so he/she is able to return to ** Return to ADL or Hobbies:  Aleshia Davalos would like to improve strength and endurance so he/she is able to return to ** Return to ADL or Hobbies:  Aleshia Davalos would like to improve strength and endurance so he/she is able to return to **             Individual Cardiac Treatment Plan  Nutrition  NUTRITION  ASSESSMENT/PLAN NUTRITION  REASSESSMENT NUTRITION   REASSESSMENT NUTRITION   REASSESSMENT NUTRITION  DISCHARGE/FOLLOW-UP   Stages of Change Stages of Change Stages of Change Stages of Change Stages of Change   [] Pre Contemplation  [] Contemplation  [] Preparation  [] Action  [x] Maintenance  [] Relapse [] Pre Contemplation  [] Contemplation  [] Preparation  [] Action  [] Maintenance  [] Relapse [] Pre Contemplation  [] Contemplation  [] Preparation  [] Action  [] Maintenance  [] Relapse [] Pre Contemplation  [] Contemplation  [] Preparation  [] Action  [] Maintenance  [] Relapse [] Pre Contemplation  [] Contemplation  [] Preparation  [] Action  [] Maintenance  [] Relapse   NUTRITION ASSESSMENT NUTRITION ASSESSMENT NUTRITION ASSESSMENT NUTRITION ASSESSMENT NUTRITION ASSESSMENT   Weight Management  Weight: 148.8        Height: 5'7   BMI: 23.3  Weight Management  Weight: **                  Weight Management  Weight: **                  Weight Management  Weight: ** Weight Management  Weight: **                  BMI: **   Eating Plan  Current eating habits: no caffeine, 2% milk Eating Plan  Changes: Eating Plan  Changes: Eating Plan  Changes: Eating Plan Improvements:   Alcohol Use  [] none          [x] daily  [] weekly      [] special   Type: beer  Amount: one to two       Diet Assessment Tool:  RATE YOUR PLATE  *Given to patient to complete and return. Diet Assessment Tool:    Score: **/69      Diet Assessment Tool: RATE YOUR PLATE  Score: **/50   NUTRITION PLAN NUTRITION PLAN NUTRITION PLAN NUTRITION PLAN NUTRITION PLAN   *Interventions* *Interventions* *Interventions* *Interventions* *Interventions*   Initial Survey given Goal Setting Discussion:   [] Yes      [] No       Follow Up Survey Reviewed & Goals Updated:     Professional Referral  Please check if needed. []Dietician Consult   []Wt. Management Referral  []Other:  Professional Referral  Please check if needed. []Dietician Consult   []Wt. Management Referral  []Other:  Professional Referral  Please check if needed. []Dietician Consult   []Wt. Management Referral  []Other:  Professional Referral  Please check if needed. []Dietician Consult   []Wt. Management Referral  []Other: Professional Referral  Please check if needed. []Dietician Consult   []Wt.  Management Referral  []Other:    *Education* *Education* *Education* *Education* *Education*   Nutritional Education Recommended     Nutritional Education Attended/Date Nutritional Education Attended/Date Nutritional Education Attended/Date All Sessions Completed? [] Yes  [] No   *Goals* *Goals* *Goals* *Goals* *Goals*   Bhanu's nutritional goals are as follows:  Complete and return diet survey [] Attend 1:1      [] Attend 1:1  [] Complete and return diet survey  Bhanu achieved nutritional goals   [] Yes    [] No  If no, why? Individual Cardiac Treatment Plan  Psychosocial  PSYCHOSOCIAL  ASSESSMENT/PLAN PSYCHOSOCIAL  REASSESSMENT PSYCHOSOCIAL   REASSESSMENT PSYCHOSOCIAL   REASSESSMENT PSYCHOSOCIAL  DISCHARGE/FOLLOW-UP   Stages of Change Stages of Change Stages of Change Stages of Change Stages of Change   [] Pre Contemplation  [] Contemplation  [] Preparation  [] Action  [x] Maintenance  [] Relapse [] Pre Contemplation  [] Contemplation  [] Preparation  [] Action  [] Maintenance  [] Relapse [] Pre Contemplation  [] Contemplation  [] Preparation  [] Action  [] Maintenance  [] Relapse [] Pre Contemplation  [] Contemplation  [] Preparation  [] Action  [] Maintenance  [] Relapse [] Pre Contemplation  [] Contemplation  [] Preparation  [] Action  [] Maintenance  [] Relapse   PSYCHOSOCIAL ASSESSMENT PSYCHOSOCIAL ASSESSMENT PSYCHOSOCIAL ASSESSMENT PSYCHOSOCIAL ASSESSMENT PSYCHOSOCIAL ASSESSMENT   Behavioral Outcomes Behavioral Outcomes Behavioral Outcomes Behavioral Outcomes Behavioral Outcomes   Tool Used:  Ferny & Pankaj, Quality of Life Index, Cardiac Version IV  *Given to patient to complete.  Tool Used:    Ferny & Pankaj, Quality of Life Index, Cardiac Version IV   QOL Index Score: **    HF:**  S&E:**  P&S: **  Family: **   Tool Used:     Ferny & Pankaj, Quality of Life Index, Cardiac Version IV  QOL Index Score: **    HF:**  S&E:**  P&S: **  Family: **   PHQ-9 score 0  Depression Severity  [x]Minimal  []Mild   []Moderate  []Moderately Severe  []Severe    PHQ-9 score **  Depression Severity  []Minimal  []Mild   []Moderate  []Moderately Severe   []Severe   Does patient have Family Support? [x] Yes      [] No  No signs of marital/family distress       Using a scale of 0-10, 0=none, 10=very:   Rate your depression: 0  Rate your anxiety:  0  Using a scale of 0-10, 0=none, 10=very:   Rate your depression: **  Rate your anxiety:  ** Using a scale of 0-10, 0=none, 10=very:   Rate your depression: **  Rate your anxiety:  ** Using a scale of 0-10, 0=none, 10=very:   Rate your depression: **  Rate your anxiety:  ** Using a scale of 0-10, 0=none, 10=very:   Rate your depression: **  Rate your anxiety:  **   Signs and Symptoms of Depression Present? [] Yes      [x] No Signs and Symptoms of Depression Present? [] Yes      [] No  If yes, please explain:  ** Signs and Symptoms of Depression Present? [] Yes      [] No  If yes, please explain:  ** Signs and Symptoms of Depression Present? [] Yes      [] No  If yes, please explain:  ** Signs and Symptoms of Depression Present? [] Yes      [] No  If yes, please explain:  **   Signs and Symptoms of Anxiety Present? [] Yes      [x] No Signs and Symptoms of Anxiety Present? [] Yes      [] No  If yes, please explain:  ** Signs and Symptoms of Anxiety Present? [] Yes      [] No  If yes, please explain:  ** Signs and Symptoms of Anxiety Present? [] Yes      [] No  If yes, please explain:  ** Signs and Symptoms of Anxiety Present? [] Yes      [] No  If yes, please explain:  **   [] Patient has poor eye contact   [] Flat affect present. [] Signs of anxiety, anger or hostility    [] Signs social isolation present.    []  Signs of alcohol or substance abuse       PSYCHOSOCIAL PLAN PSYCHOSOCIAL PLAN PSYCHOSOCIAL PLAN PSYCHOSOCIAL PLAN PSYCHOSOCIAL PLAN   *Interventions* *Interventions* *Interventions* *Interventions* *Interventions*   *Please check if needed  [] Psych Consult  [] Physician Referral  [x] Stress Management Skills *Please check if needed  [] Psych Consult  [] Physician Referral  [] Stress Management Skills *Please check if needed  [] Psych Consult  [] Physician Referral  [] Stress Management Skills *Please check if needed  [] Psych Consult  [] Physician Referral  [] Stress Management Skills *Please check if needed  [] Psych Consult  [] Physician Referral  [] Stress Management Skills   Is patient currently taking anti-depressant or anti-anxiety medications? [] Yes      [x] No Change in anti-depressant or anti-anxiety medications? [] Yes      [] No  If yes, please list medications:  ** Change in anti-depressant or anti-anxiety medications? [] Yes      [] No  If yes, please list medications:  ** Change in anti-depressant or anti-anxiety medications? [] Yes      [] No  If yes, please list medications:  ** Change in anti-depressant or anti-anxiety medications? [] Yes      [] No  If yes, please list medications:  **   *Education* *Education* *Education* *Education* *Education*   See Education Videos under Risk Factors       *Goals* *Goals* *Goals* *Goals* *Goals*   Bhanu's psychosocial goals are as follows:  Complete HADS & Ferny & Pankaj, Quality of Life Index, Cardiac Version IV [] Reduce depression symptom severity by 1 level [] Reduce depression symptom severity by 1 level [] Reduce depression symptom severity by 1 level Jaime Alvarado achieved psychosocial goals?   [] Yes    [] No  If no, why?  **  [] Use methods learned to continue to reduce depression symptom severity by 1 level     Individual Cardiac Treatment Plan  Other:  Risk Factor/Education  RISK FACTOR  ASSESSMENT/PLAN RISK FACTOR  REASSESSMENT  RISK FACTOR  REASSESSMENT RISK FACTOR  REASSESSMENT RISK FACTOR   DISCHARGE/FOLLOW-UP   Stages of Change Stages of Change Stages of Change Stages of Change Stages of Change   [] Pre Contemplation  [] Contemplation  [] Preparation  [] Action  [x] Maintenance  [] Relapse [] Pre Contemplation  [] Contemplation  [] Preparation  [] Action  [] Maintenance  [] Relapse [] Pre Contemplation  [] Contemplation  [] Preparation  [] Action  [] education sessions  [x] Takes medications as prescribed 100% of the time   [] Linda De Guzmanillo achieved risk factor goals?   [] Yes    [] No  If no, why?  **       Monitored telemetry has revealed NSR  [] documented arrhythmia at increasing workloads  [] associated symptoms   Monitored telemetry has revealed **  [] documented arrhythmia at increasing workloads  [] associated symptoms ** Monitored telemetry has revealed  [] documented arrhythmia at increasing workloads  [] associated symptoms ** Monitored telemetry has revealed **  [] documented arrhythmia at increasing workloads  [] associated symptoms ** Monitored telemetry has revealed **  [] documented arrhythmia at increasing workloads  [] associated symptoms **   Physician Response    [x] Cardiac rehab is reasonably and medically necessary for continuous cardiac monitoring surveillance  of patient's cardiac activity  [x] Initiate continuous telemerty monitoring and notify me with any concerns  [] Other   Physician Response    [x] Cardiac rehab is reasonably and medically necessary for continuous cardiac monitoring surveillance  of patient's cardiac activity  [x] Continue continuous telemerty monitoring and notify me with any concerns  [] Other     Physician Response      [x] Cardiac rehab is reasonably and medically necessary for continuous cardiac monitoring surveillance  of patient's cardiac activity  [x] Continue continuous telemerty monitoring and notify me with any concerns   [] Other     Physician Response    [x] Cardiac rehab is reasonably and medically necessary for continuous cardiac monitoring surveillance  of patient's cardiac activity  [x] Continue continuous telemerty monitoring and notify me with any concerns   [] Other      Electronically signed by Shruti Dash on 11/16/2017 at 11:33 AM     Rehab Staff Signature **  Rehab Staff Signature **  Rehab Staff   Signature **  Rehab Staff   Signature Electronically signed by Rey Bahena on

## 2017-11-29 ENCOUNTER — APPOINTMENT (OUTPATIENT)
Dept: CARDIAC REHAB | Age: 67
End: 2017-11-29
Payer: COMMERCIAL

## 2018-01-15 ENCOUNTER — OFFICE VISIT (OUTPATIENT)
Dept: PULMONOLOGY | Age: 68
End: 2018-01-15
Payer: COMMERCIAL

## 2018-01-15 VITALS
SYSTOLIC BLOOD PRESSURE: 130 MMHG | DIASTOLIC BLOOD PRESSURE: 80 MMHG | HEIGHT: 67 IN | BODY MASS INDEX: 23.86 KG/M2 | WEIGHT: 152 LBS | HEART RATE: 69 BPM | OXYGEN SATURATION: 98 % | RESPIRATION RATE: 18 BRPM

## 2018-01-15 DIAGNOSIS — G47.419 PRIMARY NARCOLEPSY WITHOUT CATAPLEXY: ICD-10-CM

## 2018-01-15 DIAGNOSIS — G47.31 COMPLEX SLEEP APNEA SYNDROME: Primary | ICD-10-CM

## 2018-01-15 PROCEDURE — 99213 OFFICE O/P EST LOW 20 MIN: CPT | Performed by: PHYSICIAN ASSISTANT

## 2018-01-15 RX ORDER — METHYLPHENIDATE HYDROCHLORIDE 20 MG/1
20 TABLET ORAL 3 TIMES DAILY
Qty: 270 TABLET | Refills: 0 | Status: SHIPPED | OUTPATIENT
Start: 2018-01-15 | End: 2018-01-15 | Stop reason: SDUPTHER

## 2018-01-15 RX ORDER — METHYLPHENIDATE HYDROCHLORIDE 10 MG/1
10 TABLET ORAL 4 TIMES DAILY
COMMUNITY
End: 2018-01-15

## 2018-01-15 RX ORDER — METHYLPHENIDATE HYDROCHLORIDE 20 MG/1
20 TABLET ORAL 3 TIMES DAILY
Qty: 90 TABLET | Refills: 0 | Status: SHIPPED | OUTPATIENT
Start: 2018-01-15 | End: 2018-01-15 | Stop reason: SDUPTHER

## 2018-01-15 RX ORDER — METHYLPHENIDATE HYDROCHLORIDE 20 MG/1
20 TABLET ORAL 3 TIMES DAILY
Qty: 270 TABLET | Refills: 0 | Status: SHIPPED | OUTPATIENT
Start: 2018-01-15 | End: 2018-04-15

## 2018-01-15 NOTE — PROGRESS NOTES
Past Surgical History:   Procedure Laterality Date    APPENDECTOMY      CATARACT REMOVAL Bilateral 2016    Right- Oct, Left- Dec-  [facility]     COLONOSCOPY  2007,     sheik    CORONARY ANGIOPLASTY WITH STENT PLACEMENT  7/05    CORONARY ANGIOPLASTY WITH STENT PLACEMENT  11/2017    2 nd stent to LAD    HEMORRHOID SURGERY  09/2016    Dr Celina Mansfield, every 2 weeks x 3 times    Vamsi Du Left 2015    Dr Santa Brenner @ [facility]   rt 2015    TONSILLECTOMY         Social History   Substance Use Topics    Smoking status: Former Smoker     Types: Cigarettes     Quit date: 7/2/2005    Smokeless tobacco: Never Used    Alcohol use Yes      Comment: social       No Known Allergies    Current Outpatient Prescriptions   Medication Sig Dispense Refill    methylphenidate (RITALIN) 10 MG tablet Take 10 mg by mouth 4 times daily.  clopidogrel (PLAVIX) 75 MG tablet Take 1 tablet by mouth daily 30 tablet 3    nitroGLYCERIN (NITROSTAT) 0.4 MG SL tablet Place 1 tablet under the tongue every 5 minutes as needed for Chest pain up to max of 3 total doses. If no relief after 1 dose, call 911. 25 tablet 3    ipratropium (ATROVENT) 0.06 % nasal spray 2 sprays by Nasal route daily as needed       cetirizine (ZYRTEC) 10 MG tablet Take 10 mg by mouth daily       atorvastatin (LIPITOR) 40 MG tablet TAKE 1 TABLET BY MOUTH ONE TIME A DAY  30 tablet 10    NONFORMULARY Macula pro, 1 tablet BID      fluocinonide (LIDEX) 0.05 % gel Apply topically 2 times daily. (Patient taking differently: as needed Apply topically 2 times daily. ) 15 g 1    B Complex Vitamins (VITAMIN-B COMPLEX PO) Take 1 tablet by mouth daily.  Calcium Carbonate-Vitamin D (CALCIUM 500/VITAMIN D PO) Take 1 tablet by mouth daily.  aspirin 81 MG chewable tablet Take 81 mg by mouth daily. No current facility-administered medications for this visit.         Family History   Problem Relation Age of Onset    Stroke Mother     Diabetes Mother     Heart Disease Father     Asthma Father         Review of Systems -   General ROS: gained 5 lbs over 3 months  ENT ROS: negative for - nasal congestion, oral lesions or sore throat  Hematological and Lymphatic ROS: negative  Endocrine ROS: negative  Respiratory ROS: no cough, shortness of breath, or wheezing  Cardiovascular ROS: no chest pain   Gastrointestinal ROS: no abdominal pain, change in bowel habits, or black or bloody stools  Musculoskeletal ROS: negative  Neurological ROS: negative    Physical Exam:    BMI:  Body mass index is 23.81 kg/m². Wt Readings from Last 3 Encounters:   01/15/18 152 lb (68.9 kg)   11/27/17 147 lb 3.2 oz (66.8 kg)   11/16/17 148 lb 8 oz (67.4 kg)     Vitals: /80   Pulse 69   Resp 18   Ht 5' 7\" (1.702 m)   Wt 152 lb (68.9 kg)   SpO2 98% Comment: R/A at rest  BMI 23.81 kg/m²       General appearance: alert and oriented to person, place and time, well-developed and well-nourished, in no acute distress  Nose: Nares normal. Septum midline. Mucosa normal. No drainage or sinus tenderness. Oropharynx:  negative  Lungs: clear to auscultation bilaterally  Heart: regular rate and rhythm, S1, S2 normal, no murmur, click, rub or gallop  Extremities: extremities normal, atraumatic, no cyanosis or edema  Neurologic: Mental status: Alert, oriented, thought content appropriate    ECHO 05/18/2017     Summary   Ejection fraction is visually estimated at 60%. Overall left ventricular function is normal.       ASSESSMENT/DIAGNOSIS    1. Complex sleep apnea syndrome     2. Primary narcolepsy without cataplexy              Plan   Do you need any equipment today? Yes chin strap to improve dry mouth and leak  - Will increase Ritalin to 20 mg TID  - He  was advised to continue current positive airway pressure therapy with above described pressure.    - He  advised to keep good compliance with current recommended pressure to get optimal results and clinical improvement  - Recommend 7-9 hours of sleep with PAP  - He was advised to call DME company regarding supplies if needed. - He call my office for earlier appointment if needed for worsening of sleep symptoms.   - He was instructed on weight loss  - Matt Sky was educated about my impression and plan. Patient verbalizes understanding.   We will see Michael Res back in: 1 year with download    Kya Villa PA-C, MENDY  1/15/2018

## 2018-02-08 RX ORDER — CLOPIDOGREL BISULFATE 75 MG/1
75 TABLET ORAL DAILY
Qty: 30 TABLET | Refills: 4 | Status: SHIPPED | OUTPATIENT
Start: 2018-02-08 | End: 2018-06-04 | Stop reason: SDUPTHER

## 2018-04-28 DIAGNOSIS — E78.00 HYPERCHOLESTEREMIA: ICD-10-CM

## 2018-05-01 RX ORDER — ATORVASTATIN CALCIUM 40 MG/1
TABLET, FILM COATED ORAL
Qty: 90 TABLET | Refills: 9 | Status: SHIPPED | OUTPATIENT
Start: 2018-05-01 | End: 2019-07-24 | Stop reason: SDUPTHER

## 2018-05-09 ENCOUNTER — TELEPHONE (OUTPATIENT)
Dept: PULMONOLOGY | Age: 68
End: 2018-05-09

## 2018-05-09 RX ORDER — METHYLPHENIDATE HYDROCHLORIDE 20 MG/1
20 TABLET ORAL 3 TIMES DAILY
Qty: 270 TABLET | Refills: 0 | Status: SHIPPED | OUTPATIENT
Start: 2018-05-09 | End: 2018-09-04 | Stop reason: SDUPTHER

## 2018-05-22 ENCOUNTER — HOSPITAL ENCOUNTER (OUTPATIENT)
Dept: AUDIOLOGY | Age: 68
Discharge: HOME | End: 2018-05-22

## 2018-05-22 PROCEDURE — 92592 HC HEARING AID CHECK, ONE EAR: CPT | Performed by: AUDIOLOGIST

## 2018-05-23 ENCOUNTER — TELEPHONE (OUTPATIENT)
Dept: CARDIOLOGY CLINIC | Age: 68
End: 2018-05-23

## 2018-06-04 ENCOUNTER — OFFICE VISIT (OUTPATIENT)
Dept: CARDIOLOGY CLINIC | Age: 68
End: 2018-06-04
Payer: COMMERCIAL

## 2018-06-04 VITALS
HEIGHT: 67 IN | WEIGHT: 151.8 LBS | SYSTOLIC BLOOD PRESSURE: 128 MMHG | BODY MASS INDEX: 23.83 KG/M2 | DIASTOLIC BLOOD PRESSURE: 70 MMHG | HEART RATE: 60 BPM

## 2018-06-04 DIAGNOSIS — E78.01 FAMILIAL HYPERCHOLESTEROLEMIA: ICD-10-CM

## 2018-06-04 DIAGNOSIS — I10 ESSENTIAL HYPERTENSION: ICD-10-CM

## 2018-06-04 DIAGNOSIS — I25.10 CORONARY ARTERY DISEASE INVOLVING NATIVE CORONARY ARTERY OF NATIVE HEART WITHOUT ANGINA PECTORIS: Primary | ICD-10-CM

## 2018-06-04 PROCEDURE — 99213 OFFICE O/P EST LOW 20 MIN: CPT | Performed by: NUCLEAR MEDICINE

## 2018-06-04 RX ORDER — CLOPIDOGREL BISULFATE 75 MG/1
75 TABLET ORAL DAILY
Qty: 30 TABLET | Refills: 11 | Status: SHIPPED | OUTPATIENT
Start: 2018-06-04 | End: 2020-06-29

## 2018-09-04 DIAGNOSIS — G47.10 HYPERSOMNIA: Primary | ICD-10-CM

## 2018-09-04 NOTE — TELEPHONE ENCOUNTER
Jeronimo Andres called requesting a refill on the following medications:  Requested Prescriptions     Pending Prescriptions Disp Refills    methylphenidate (RITALIN) 20 MG tablet 270 tablet 0     Sig: Take 1 tablet by mouth 3 times daily for 90 days. .     Pharmacy verified:  [email or web address]    [facility]    Date of last visit: 01/15/18  Date of next visit (if applicable): 6/19/1451

## 2018-09-05 RX ORDER — METHYLPHENIDATE HYDROCHLORIDE 20 MG/1
20 TABLET ORAL 3 TIMES DAILY
Qty: 270 TABLET | Refills: 0 | Status: SHIPPED | OUTPATIENT
Start: 2018-09-05 | End: 2019-01-15 | Stop reason: SDUPTHER

## 2018-11-01 ENCOUNTER — TELEPHONE (OUTPATIENT)
Dept: FAMILY MEDICINE CLINIC | Age: 68
End: 2018-11-01

## 2018-11-01 ENCOUNTER — NURSE ONLY (OUTPATIENT)
Dept: FAMILY MEDICINE CLINIC | Age: 68
End: 2018-11-01

## 2018-11-01 DIAGNOSIS — Z23 NEED FOR INFLUENZA VACCINATION: Primary | ICD-10-CM

## 2018-11-01 PROCEDURE — 90471 IMMUNIZATION ADMIN: CPT | Performed by: FAMILY MEDICINE

## 2018-11-01 PROCEDURE — 90656 IIV3 VACC NO PRSV 0.5 ML IM: CPT | Performed by: FAMILY MEDICINE

## 2018-11-07 ENCOUNTER — OFFICE VISIT (OUTPATIENT)
Dept: FAMILY MEDICINE CLINIC | Age: 68
End: 2018-11-07

## 2018-11-07 VITALS
RESPIRATION RATE: 14 BRPM | HEIGHT: 67 IN | WEIGHT: 152 LBS | DIASTOLIC BLOOD PRESSURE: 74 MMHG | HEART RATE: 68 BPM | BODY MASS INDEX: 23.86 KG/M2 | SYSTOLIC BLOOD PRESSURE: 126 MMHG

## 2018-11-07 DIAGNOSIS — K64.1 GRADE II HEMORRHOIDS: ICD-10-CM

## 2018-11-07 DIAGNOSIS — E78.00 HYPERCHOLESTEREMIA: Primary | ICD-10-CM

## 2018-11-07 DIAGNOSIS — I25.10 ASHD (ARTERIOSCLEROTIC HEART DISEASE): ICD-10-CM

## 2018-11-07 DIAGNOSIS — L43.9 LICHEN PLANUS: ICD-10-CM

## 2018-11-07 DIAGNOSIS — Z12.11 COLON CANCER SCREENING: ICD-10-CM

## 2018-11-07 DIAGNOSIS — G47.419 PRIMARY NARCOLEPSY WITHOUT CATAPLEXY: ICD-10-CM

## 2018-11-07 DIAGNOSIS — R06.3 CENTRAL SLEEP APNEA DUE TO CHEYNE-STOKES RESPIRATION: ICD-10-CM

## 2018-11-07 DIAGNOSIS — N40.0 BENIGN PROSTATIC HYPERPLASIA WITHOUT LOWER URINARY TRACT SYMPTOMS: ICD-10-CM

## 2018-11-07 DIAGNOSIS — I77.9 BILATERAL CAROTID ARTERY DISEASE, UNSPECIFIED TYPE (HCC): ICD-10-CM

## 2018-11-07 PROCEDURE — 99214 OFFICE O/P EST MOD 30 MIN: CPT | Performed by: FAMILY MEDICINE

## 2018-11-07 RX ORDER — FLUOCINONIDE GEL 0.5 MG/G
GEL TOPICAL
Qty: 15 G | Refills: 1 | Status: SHIPPED | OUTPATIENT
Start: 2018-11-07 | End: 2019-11-07 | Stop reason: SDUPTHER

## 2018-11-07 ASSESSMENT — ENCOUNTER SYMPTOMS
COUGH: 0
BACK PAIN: 0
SORE THROAT: 0
CONSTIPATION: 0
SHORTNESS OF BREATH: 0
BLOOD IN STOOL: 0
ABDOMINAL PAIN: 0
TROUBLE SWALLOWING: 0
NAUSEA: 0
EYE PAIN: 0
CHEST TIGHTNESS: 0

## 2018-11-07 ASSESSMENT — PATIENT HEALTH QUESTIONNAIRE - PHQ9
SUM OF ALL RESPONSES TO PHQ9 QUESTIONS 1 & 2: 0
SUM OF ALL RESPONSES TO PHQ QUESTIONS 1-9: 0
1. LITTLE INTEREST OR PLEASURE IN DOING THINGS: 0
SUM OF ALL RESPONSES TO PHQ QUESTIONS 1-9: 0
2. FEELING DOWN, DEPRESSED OR HOPELESS: 0

## 2018-11-07 NOTE — PROGRESS NOTES
Subjective:      Patient ID: Zenaida Schmidt is a 80 y.o. male. Narcolepsy   At  Sleep clinic          Complex  Sleep  Apnea        hand  Pain  Noted        ashd    With  Stent  And  In         Hyperlipidemia   This is a chronic problem. The current episode started more than 1 year ago. The problem is controlled. Recent lipid tests were reviewed and are normal. Pertinent negatives include no chest pain or shortness of breath. Current antihyperlipidemic treatment includes statins. The current treatment provides significant improvement of lipids. Past Medical History:   Diagnosis Date    Actinic keratoses     Arthritis     hands    ASHD (arteriosclerotic heart disease)     Carotid disease, bilateral ([address] 2017    Colon polyps 5/07    Hypercholesteremia 7/97    Narcolepsy     Rotator cuff (capsule) sprain     Tobacco abuse     Unspecified sleep apnea      Past Surgical History:   Procedure Laterality Date    APPENDECTOMY      CATARACT REMOVAL Bilateral 2016    Right- Oct, Left- Dec- [facility]     COLONOSCOPY  2007,     sheik    CORONARY ANGIOPLASTY WITH STENT PLACEMENT  7/05    CORONARY ANGIOPLASTY WITH STENT PLACEMENT  11/2017    2 nd stent to LAD    HEMORRHOID SURGERY  09/2016    Dr Jl Demarco, every 2 weeks x 3 times    Zain Plate Left 2015    Dr Jennifer Ricks @ [facility]   rt 2015    TONSILLECTOMY       Social History     Social History    Marital status:      Spouse name: N/A    Number of children: N/A    Years of education: N/A     Occupational History    Not on file.      Social History Main Topics    Smoking status: Former Smoker     Packs/day: 1.00     Years: 45.00     Types: Cigarettes     Quit date: 7/2/2005    Smokeless tobacco: Never Used    Alcohol use Yes      Comment: social    Drug use: No    Sexual activity: Yes     Partners: Female     Other Topics Concern    Not on file     Social History Narrative    No narrative on file     Family History   Problem Relation Age of Onset    Stroke Mother     Diabetes Mother     Heart Disease Father     Asthma Father      Review of Systems   Constitutional: Negative for fatigue and fever. HENT: Negative for congestion, ear pain, postnasal drip, sore throat and trouble swallowing. Eyes: Negative for pain. Respiratory: Negative for cough, chest tightness and shortness of breath. Cardiovascular: Negative for chest pain, palpitations and leg swelling. Gastrointestinal: Negative for abdominal pain, blood in stool, constipation and nausea. Genitourinary: Negative for difficulty urinating, frequency and urgency. Musculoskeletal: Negative for arthralgias, back pain, joint swelling and neck stiffness. Skin: Negative for rash. Neurological: Negative for dizziness, weakness and headaches. Hematological: Negative for adenopathy. Does not bruise/bleed easily. Psychiatric/Behavioral: Negative for behavioral problems, dysphoric mood and sleep disturbance. /74 (Site: Right Upper Arm, Position: Sitting, Cuff Size: Medium Adult)   Pulse 68   Resp 14   Ht 5' 7\" (1.702 m)   Wt 152 lb (68.9 kg)   BMI 23.81 kg/m²   Objective:   Physical Exam   Constitutional: He is oriented to person, place, and time. He appears well-developed and well-nourished. HENT:   Head: Normocephalic and atraumatic. Right Ear: External ear normal.   Left Ear: External ear normal.   Nose: Nose normal.   Mouth/Throat: Oropharynx is clear and moist.   Eyes: Pupils are equal, round, and reactive to light. Conjunctivae and EOM are normal.   Fundi nl   Neck: Normal range of motion. Neck supple. No thyromegaly present. Cardiovascular: Normal rate, regular rhythm, normal heart sounds and intact distal pulses. Pulmonary/Chest: Effort normal and breath sounds normal. He has no wheezes. He has no rales. Abdominal: Soft. Bowel sounds are normal. He exhibits no mass. There is no tenderness.  Hernia confirmed negative in the right inguinal area and confirmed negative in the left inguinal area. Genitourinary: Testes normal and penis normal. Rectal exam shows no external hemorrhoid, no internal hemorrhoid, no fissure, no mass, no tenderness, anal tone normal and guaiac negative stool. Prostate is enlarged (2+). Right testis shows no mass, no swelling and no tenderness. Left testis shows no mass, no swelling and no tenderness. Uncircumcised. Musculoskeletal: Normal range of motion. Lymphadenopathy:     He has no cervical adenopathy. Neurological: He is alert and oriented to person, place, and time. He has normal reflexes. No cranial nerve deficit. Skin: Skin is warm and dry. No rash noted. Psychiatric: He has a normal mood and affect. Nursing note and vitals reviewed. Assessment:       Diagnosis Orders   1. Hypercholesteremia     2. Lichen planus  fluocinonide (LIDEX) 0.05 % gel   3. Bilateral carotid artery disease, unspecified type ([facility] [address]     4. ASHD (arteriosclerotic heart disease)     5. Primary narcolepsy without cataplexy     6. Grade II hemorrhoids     7. Central sleep apnea due to Cheyne-Santos respiration     8. Benign prostatic hyperplasia without lower urinary tract symptoms     9. Colon cancer screening           Plan:      Current Outpatient Prescriptions   Medication Sig Dispense Refill    methylphenidate (RITALIN) 20 MG tablet Take 1 tablet by mouth 3 times daily for 90 days. . 270 tablet 0    clopidogrel (PLAVIX) 75 MG tablet Take 1 tablet by mouth daily 30 tablet 11    atorvastatin (LIPITOR) 40 MG tablet TAKE 1 TABLET BY MOUTH ONE TIME A DAY  90 tablet 9    nitroGLYCERIN (NITROSTAT) 0.4 MG SL tablet Place 1 tablet under the tongue every 5 minutes as needed for Chest pain up to max of 3 total doses.  If no relief after 1 dose, call 911. 25 tablet 3    ipratropium (ATROVENT) 0.06 % nasal spray 2 sprays by Nasal route daily as needed       cetirizine (ZYRTEC) 10 MG tablet Take 10 mg by mouth daily       NONFORMULARY Macula pro, 1 tablet BID      fluocinonide (LIDEX) 0.05 % gel Apply topically 2 times daily. (Patient taking differently: as needed Apply topically 2 times daily. ) 15 g 1    B Complex Vitamins (VITAMIN-B COMPLEX PO) Take 1 tablet by mouth daily.  Calcium Carbonate-Vitamin D (CALCIUM 500/VITAMIN D PO) Take 1 tablet by mouth daily.  aspirin 81 MG chewable tablet Take 81 mg by mouth daily. No current facility-administered medications for this visit. Orders Placed This Encounter   Procedures    Lipid Panel     Standing Status:   Future     Standing Expiration Date:   11/7/2019     Order Specific Question:   Is Patient Fasting?/# of Hours     Answer:   YES 12 HOURS    Comprehensive Metabolic Panel     Standing Status:   Future     Standing Expiration Date:   11/7/2019    TSH with Reflex     Standing Status:   Future     Standing Expiration Date:   11/7/2019    PSA, Diagnostic     Standing Status:   Future     Standing Expiration Date:   11/7/2019    CBC Auto Differential     Standing Status:   Future     Standing Expiration Date:   11/7/2019    POCT Fecal Immunochemical Test (FIT)     Standing Status:   Future     Standing Expiration Date:   1/7/2019   No results found for this visit on 11/07/18. At time of rectal did do a stool cold. The stool itself is brown in color and we added the reactive agent the stool did not turn blue hours some mucus around the stool turned blue.  And feel probably related to his hemorrhoids and we will do a fit test to ensure    vascular    Screen   To  Repeat    see in  6 mths   Amirah Samuels MD

## 2018-11-08 ENCOUNTER — NURSE ONLY (OUTPATIENT)
Dept: FAMILY MEDICINE CLINIC | Age: 68
End: 2018-11-08

## 2018-11-08 DIAGNOSIS — Z12.11 COLON CANCER SCREENING: ICD-10-CM

## 2018-11-08 LAB
ABSOLUTE BASO #: 0.1 K/UL (ref 0–0.1)
ABSOLUTE EOS #: 0.2 K/UL (ref 0.1–0.4)
ABSOLUTE LYMPH #: 1 K/UL (ref 0.8–5.2)
ABSOLUTE MONO #: 0.4 K/UL (ref 0.1–0.9)
ABSOLUTE NEUT #: 2.3 K/UL (ref 1.3–9.1)
ALBUMIN SERPL-MCNC: 4.3 G/DL (ref 3.5–5.2)
ALK PHOSPHATASE: 87 U/L (ref 39–118)
ALT SERPL-CCNC: 29 U/L (ref 5–50)
ANION GAP SERPL CALCULATED.3IONS-SCNC: 12 MEQ/L (ref 10–19)
AST SERPL-CCNC: 23 U/L (ref 9–50)
BASOPHILS RELATIVE PERCENT: 1.7 %
BILIRUB SERPL-MCNC: 1 MG/DL
BUN BLDV-MCNC: 18 MG/DL (ref 8–23)
CALCIUM SERPL-MCNC: 9.1 MG/DL (ref 8.5–10.5)
CHLORIDE BLD-SCNC: 105 MEQ/L (ref 95–107)
CHOLESTEROL/HDL RATIO: 2.7
CHOLESTEROL: 153 MG/DL
CO2: 28 MEQ/L (ref 19–31)
CONTROL: NORMAL
CREAT SERPL-MCNC: 1 MG/DL (ref 0.8–1.4)
EGFR AFRICAN AMERICAN: 89.9 ML/MIN/1.73 M2
EGFR IF NONAFRICAN AMERICAN: 77.5 ML/MIN/1.73 M2
EOSINOPHILS RELATIVE PERCENT: 5.7 %
GLUCOSE: 97 MG/DL (ref 70–99)
HCT VFR BLD CALC: 49 % (ref 41.4–51)
HDLC SERPL-MCNC: 57.7 MG/DL
HEMOCCULT STL QL: NEGATIVE
HEMOGLOBIN: 16.8 G/DL (ref 13.8–17)
LDL CHOLESTEROL CALCULATED: 80 MG/DL
LDL/HDL RATIO: 1.4
LYMPHOCYTE %: 25.2 %
MCH RBC QN AUTO: 31.1 PG (ref 27–34)
MCHC RBC AUTO-ENTMCNC: 34.3 G/DL (ref 31–36)
MCV RBC AUTO: 90.6 FL (ref 80–100)
MONOCYTES # BLD: 9.5 %
NEUTROPHILS RELATIVE PERCENT: 57.7 %
PDW BLD-RTO: 12.2 % (ref 10.8–14.8)
PLATELETS: 239 K/UL (ref 150–450)
POTASSIUM SERPL-SCNC: 4.3 MEQ/L (ref 3.5–5.4)
PSA, ULTRASENSITIVE: 3.08 NG/ML
RBC: 5.41 M/UL (ref 4–5.5)
SODIUM BLD-SCNC: 145 MEQ/L (ref 135–146)
TOTAL PROTEIN: 6.7 G/DL (ref 6.1–8.3)
TRIGL SERPL-MCNC: 75 MG/DL
TSH SERPL DL<=0.05 MIU/L-ACNC: 2.66 UIU/ML (ref 0.4–4.1)
VLDLC SERPL CALC-MCNC: 15 MG/DL
WBC: 4 K/UL (ref 3.7–10.8)

## 2018-11-08 PROCEDURE — 82274 ASSAY TEST FOR BLOOD FECAL: CPT | Performed by: FAMILY MEDICINE

## 2018-11-09 ENCOUNTER — TELEPHONE (OUTPATIENT)
Dept: FAMILY MEDICINE CLINIC | Age: 68
End: 2018-11-09

## 2018-11-09 NOTE — TELEPHONE ENCOUNTER
----- Message from Adam Irby MD sent at 11/9/2018  6:38 AM EST -----  Levels  Good and chol 153 and ldl 80 as all good

## 2018-11-13 ENCOUNTER — HOSPITAL ENCOUNTER (OUTPATIENT)
Dept: INTERVENTIONAL RADIOLOGY/VASCULAR | Age: 68
Discharge: HOME | End: 2018-11-13

## 2018-11-13 DIAGNOSIS — Z13.6 ENCOUNTER FOR SCREENING FOR VASCULAR DISEASE: ICD-10-CM

## 2018-11-13 PROCEDURE — 9900000021 US VASCULAR SCREENING

## 2018-12-18 ENCOUNTER — OFFICE VISIT (OUTPATIENT)
Dept: PULMONOLOGY | Age: 68
End: 2018-12-18
Payer: COMMERCIAL

## 2018-12-18 VITALS
SYSTOLIC BLOOD PRESSURE: 132 MMHG | DIASTOLIC BLOOD PRESSURE: 86 MMHG | WEIGHT: 154.4 LBS | HEART RATE: 76 BPM | OXYGEN SATURATION: 99 % | HEIGHT: 67 IN | BODY MASS INDEX: 24.23 KG/M2

## 2018-12-18 DIAGNOSIS — G47.10 HYPERSOMNIA: ICD-10-CM

## 2018-12-18 DIAGNOSIS — G47.31 COMPLEX SLEEP APNEA SYNDROME: Primary | ICD-10-CM

## 2018-12-18 PROCEDURE — 99213 OFFICE O/P EST LOW 20 MIN: CPT | Performed by: PHYSICIAN ASSISTANT

## 2018-12-18 ASSESSMENT — ENCOUNTER SYMPTOMS
EYES NEGATIVE: 1
SHORTNESS OF BREATH: 0
DIARRHEA: 0
COUGH: 0
NAUSEA: 0
ALLERGIC/IMMUNOLOGIC NEGATIVE: 1
WHEEZING: 0
CHEST TIGHTNESS: 0
STRIDOR: 0
BACK PAIN: 0

## 2019-01-15 DIAGNOSIS — G47.10 HYPERSOMNIA: ICD-10-CM

## 2019-01-15 RX ORDER — METHYLPHENIDATE HYDROCHLORIDE 20 MG/1
20 TABLET ORAL 3 TIMES DAILY
Qty: 270 TABLET | Refills: 0 | Status: SHIPPED | OUTPATIENT
Start: 2019-01-15 | End: 2019-05-07 | Stop reason: SDUPTHER

## 2019-05-07 ENCOUNTER — OFFICE VISIT (OUTPATIENT)
Dept: FAMILY MEDICINE CLINIC | Age: 69
End: 2019-05-07

## 2019-05-07 VITALS
RESPIRATION RATE: 14 BRPM | WEIGHT: 152 LBS | HEART RATE: 60 BPM | BODY MASS INDEX: 23.86 KG/M2 | SYSTOLIC BLOOD PRESSURE: 146 MMHG | HEIGHT: 67 IN | DIASTOLIC BLOOD PRESSURE: 70 MMHG

## 2019-05-07 DIAGNOSIS — I25.10 ASHD (ARTERIOSCLEROTIC HEART DISEASE): ICD-10-CM

## 2019-05-07 DIAGNOSIS — H69.81 EUSTACHIAN TUBE DYSFUNCTION, RIGHT: Primary | ICD-10-CM

## 2019-05-07 DIAGNOSIS — G47.419 PRIMARY NARCOLEPSY WITHOUT CATAPLEXY: ICD-10-CM

## 2019-05-07 DIAGNOSIS — G47.10 HYPERSOMNIA: ICD-10-CM

## 2019-05-07 DIAGNOSIS — H61.22 HEARING LOSS DUE TO CERUMEN IMPACTION, LEFT: ICD-10-CM

## 2019-05-07 DIAGNOSIS — J30.1 SEASONAL ALLERGIC RHINITIS DUE TO POLLEN: ICD-10-CM

## 2019-05-07 PROCEDURE — 69210 REMOVE IMPACTED EAR WAX UNI: CPT | Performed by: FAMILY MEDICINE

## 2019-05-07 PROCEDURE — 99213 OFFICE O/P EST LOW 20 MIN: CPT | Performed by: FAMILY MEDICINE

## 2019-05-07 RX ORDER — METHYLPHENIDATE HYDROCHLORIDE 20 MG/1
20 TABLET ORAL 3 TIMES DAILY
Qty: 270 TABLET | Refills: 0 | Status: SHIPPED | OUTPATIENT
Start: 2019-05-07 | End: 2019-09-16 | Stop reason: SDUPTHER

## 2019-05-07 ASSESSMENT — ENCOUNTER SYMPTOMS
TROUBLE SWALLOWING: 0
CONSTIPATION: 0
CHEST TIGHTNESS: 0
BLOOD IN STOOL: 0
NAUSEA: 0
BACK PAIN: 0
SHORTNESS OF BREATH: 0
ABDOMINAL PAIN: 0
SORE THROAT: 0
EYE PAIN: 0
COUGH: 0

## 2019-05-07 ASSESSMENT — PATIENT HEALTH QUESTIONNAIRE - PHQ9
SUM OF ALL RESPONSES TO PHQ QUESTIONS 1-9: 0
SUM OF ALL RESPONSES TO PHQ9 QUESTIONS 1 & 2: 0
2. FEELING DOWN, DEPRESSED OR HOPELESS: 0
SUM OF ALL RESPONSES TO PHQ QUESTIONS 1-9: 0
1. LITTLE INTEREST OR PLEASURE IN DOING THINGS: 0

## 2019-05-07 NOTE — PROGRESS NOTES
No components found for: CHLPL  Lab Results   Component Value Date    TRIG 75 11/08/2018     Lab Results   Component Value Date    HDL 57.7 11/08/2018     Lab Results   Component Value Date    LDLCALC 80 11/08/2018     Lab Results   Component Value Date    LABVLDL 15 11/08/2018       Lab Results   Component Value Date    ALT 29 11/08/2018    AST 23 11/08/2018    ALKPHOS 87 11/08/2018    BILITOT 1.0 11/08/2018           Is patient currently taking any cholesterol medications? Yes   If yes, see med list as above    Is the patient reporting any side effects of cholesterol medications? No    Is the patient taking any over the counter medications? Yes   If yes, see med list as above    Is the patient taking a daily aspirin? Yes      Patient Self-Management Goal for Chronic Condition  Goal: I will take all medications as prescribed by my doctor, and I will call the office if I am having any medication problems. Barriers to success: none  Plan for overcoming my barriers: N/A     Confidence: 10/10  Date goal set: 5/7/19  Date goal attained:     Have you seen any other physician or provider since your last visit no    Have you had any other diagnostic tests since your last visit? no    Have you changed or stopped any medications since your last visit including any over-the-counter medicines, vitamins, or herbal medicines? no     Are you taking all your prescribed medications?  Yes    If NO, why?

## 2019-05-07 NOTE — TELEPHONE ENCOUNTER
[facility] called requesting a refill on the following medications:  Requested Prescriptions     Pending Prescriptions Disp Refills    methylphenidate (RITALIN) 20 MG tablet 270 tablet 0     Sig: Take 1 tablet by mouth 3 times daily for 90 days.      Pharmacy verified:  [facility]    Date of last visit: 12/18/18  Date of next visit (if applicable): 50/58/51X

## 2019-05-07 NOTE — PROGRESS NOTES
well-nourished. HENT:   Head: Normocephalic and atraumatic. Right Ear: External ear normal.   Left Ear: External ear normal.   Nose: Nose normal.   Mouth/Throat: Oropharynx is clear and moist.   Right ear is with retraction. There is no wax there is no infection. There is nasal congestion the left  Ear  did have impacted  wax which removed the cerumen impaction felt much better the ear canal is normal and the eardrum was normal   Eyes: Pupils are equal, round, and reactive to light. Conjunctivae and EOM are normal.   Fundi nl   Neck: Normal range of motion. Neck supple. No thyromegaly present. Cardiovascular: Normal rate, regular rhythm, normal heart sounds and intact distal pulses. Pulmonary/Chest: Effort normal and breath sounds normal. He has no wheezes. He has no rales. Abdominal: Soft. Bowel sounds are normal. He exhibits no mass. There is no tenderness. Musculoskeletal: Normal range of motion. Lymphadenopathy:     He has no cervical adenopathy. Neurological: He is alert and oriented to person, place, and time. He has normal reflexes. No cranial nerve deficit. Skin: Skin is warm and dry. No rash noted. Psychiatric: He has a normal mood and affect. Nursing note and vitals reviewed. Cerumen  Impaction on the  Left   Assessment:       Diagnosis Orders   1. Eustachian tube dysfunction, right     2. Hearing loss due to cerumen impaction, left  FL REMOVAL IMPACTED CERUMEN INSTRUMENTATION UNILAT   3. ASHD (arteriosclerotic heart disease)     4. Primary narcolepsy without cataplexy     5. Seasonal allergic rhinitis due to pollen           Plan:      Current Outpatient Medications   Medication Sig Dispense Refill    fluocinonide (LIDEX) 0.05 % gel Apply topically 2 times daily.  15 g 1    clopidogrel (PLAVIX) 75 MG tablet Take 1 tablet by mouth daily 30 tablet 11    atorvastatin (LIPITOR) 40 MG tablet TAKE 1 TABLET BY MOUTH ONE TIME A DAY  90 tablet 9    nitroGLYCERIN (NITROSTAT) 0.4 MG SL tablet Place 1 tablet under the tongue every 5 minutes as needed for Chest pain up to max of 3 total doses. If no relief after 1 dose, call 911. 25 tablet 3    ipratropium (ATROVENT) 0.06 % nasal spray 2 sprays by Nasal route daily as needed       cetirizine (ZYRTEC) 10 MG tablet Take 10 mg by mouth daily       NONFORMULARY Macula pro, 1 tablet BID      B Complex Vitamins (VITAMIN-B COMPLEX PO) Take 1 tablet by mouth daily.  Calcium Carbonate-Vitamin D (CALCIUM 500/VITAMIN D PO) Take 1 tablet by mouth daily.  aspirin 81 MG chewable tablet Take 81 mg by mouth daily. No current facility-administered medications for this visit. Orders Placed This Encounter   Procedures    NC REMOVAL IMPACTED CERUMEN INSTRUMENTATION UNILAT     Left  Ear  With  impacted  Wax and  Removal  Of  Wax of  Left  Ear  With  Cerumen spoon and  Ear  Drum  wnl       See in nov and   bp  Check     nextr  Week .   For   Right  Ear  Steroid  Spray and  zyrtec  Latrelle Merlin, MD

## 2019-06-24 NOTE — PROGRESS NOTES
[facility] PROFESSIONAL SERVICES  [facility]   [address] [place] [address]   [place] [address]   Dept: [phone]   Dept Fax: [phone]   Loc: [phone]      Chief Complaint   Patient presents with    1 Year Follow Up    Coronary Artery Disease     F/U yearly office visit, had to reschedule missed appt with Dr. Luiz Guillory. Has been very active without difficulty or complaints. Denies chest pain, palpitations, sob, CAITY, lightheadedness, dizziness or syncope. Monitors his BP and HR at home periodically and states the his SBP no higher than 120, DBP running 70's-80's, and HR 60's-70's. Cardiologist:  Dr. Jamar Shene:   No fever, no chills, No fatigue or weight loss  Pulmonary:    No dyspnea, no wheezing  Cardiac:    Denies recent chest pain   GI:     No nausea or vomiting, no abdominal pain  Neuro:    No dizziness or light headedness  Musculoskeletal:  No recent active issues  Extremities:   No edema, good peripheral pulses      Past Medical History:   Diagnosis Date    Actinic keratoses     Arthritis     hands    ASHD (arteriosclerotic heart disease)     Carotid disease, bilateral ([facility] [address] 2017    Colon polyps 5/07    Hypercholesteremia 7/97    Narcolepsy     Rotator cuff (capsule) sprain     Tobacco abuse     Unspecified sleep apnea        No Known Allergies    Current Outpatient Medications   Medication Sig Dispense Refill    methylphenidate (RITALIN) 20 MG tablet Take 1 tablet by mouth 3 times daily for 90 days. 270 tablet 0    fluocinonide (LIDEX) 0.05 % gel Apply topically 2 times daily. 15 g 1    clopidogrel (PLAVIX) 75 MG tablet Take 1 tablet by mouth daily 30 tablet 11    atorvastatin (LIPITOR) 40 MG tablet TAKE 1 TABLET BY MOUTH ONE TIME A DAY  90 tablet 9    nitroGLYCERIN (NITROSTAT) 0.4 MG SL tablet Place 1 tablet under the tongue every 5 minutes as needed for Chest pain up to max of 3 total doses.  If no relief after 1 dose, call 911. 25 tablet 3    ipratropium (ATROVENT) 0.06 % nasal spray 2 sprays by Nasal route daily as needed       cetirizine (ZYRTEC) 10 MG tablet Take 10 mg by mouth daily       NONFORMULARY Macula pro, 1 tablet BID      B Complex Vitamins (VITAMIN-B COMPLEX PO) Take 1 tablet by mouth daily.  Calcium Carbonate-Vitamin D (CALCIUM 500/VITAMIN D PO) Take 1 tablet by mouth daily.  aspirin 81 MG chewable tablet Take 81 mg by mouth daily. No current facility-administered medications for this visit.         Social History     Socioeconomic History    Marital status:      Spouse name: None    Number of children: None    Years of education: None    Highest education level: None   Occupational History    None   Social Needs    Financial resource strain: None    Food insecurity:     Worry: None     Inability: None    Transportation needs:     Medical: None     Non-medical: None   Tobacco Use    Smoking status: Former Smoker     Packs/day: 1.00     Years: 45.00     Pack years: 45.00     Types: Cigarettes     Last attempt to quit: 2005     Years since quittin.9    Smokeless tobacco: Never Used   Substance and Sexual Activity    Alcohol use: Yes     Comment: social    Drug use: No    Sexual activity: Yes     Partners: Female   Lifestyle    Physical activity:     Days per week: None     Minutes per session: None    Stress: None   Relationships    Social connections:     Talks on phone: None     Gets together: None     Attends [employer] service: None     Active member of club or organization: None     Attends meetings of clubs or organizations: None     Relationship status: None    Intimate partner violence:     Fear of current or ex partner: None     Emotionally abused: None     Physically abused: None     Forced sexual activity: None   Other Topics Concern    None   Social History Narrative    None       Family History   Problem Relation Age of Onset    Stroke Mother     Diabetes Mother     Heart Disease Father     Asthma Father        Blood pressure 122/78, pulse 63, height 5' 7\" (1.702 m), weight 154 lb 3.2 oz (69.9 kg). General:   Well developed, well nourished  Lungs:   Clear to auscultation  Heart:    Normal S1 S2, No murmur, rubs, or gallops  Abdomen:   Soft, non tender, no organomegalies, positive bowel sounds  Extremities:   No edema, no cyanosis, good peripheral pulses  Neurological:   Awake, alert, oriented. No obvious focal deficits  Musculoskeletal:  No obvious deformities    EKG:     [facility]: 11/2/17  HEMODYNAMIC RESULTS AND LEFT VENTRICULOGRAM:  Left ventricular  end-diastolic pressure was 12 mmHg. No significant change before and  after contrast injection. No significant gradient across the aortic  valve to signify aortic stenosis. Left ventricular function was  normal, EF 60%.     CORONARY ANGIOGRAM RESULTS:  1. Left main is patent, gives rise to LAD and left circumflex. 2.  Left anterior descending artery has heavy calcification and there  is a stent in the proximal segment, which has some haziness and  diffuse disease about 60% with diffuse disease distally in the distal  LAD. 3.  Left circumflex artery has diffuse nonspecific findings with  diffuse nonobstructive disease and is dominant. 4.  Right coronary artery is nondominant, relatively small, and has  nonobstructive mild luminal irregularities.     CONCLUSION:  1. Pretty much nonobstructive disease with suspicious lesion in the  area of the stent of the LAD. 2.  Normal LV function. 3.  No complication.     RECOMMENDATIONS:  At this point, given the patient's symptoms and  history, I discussed the case with Dr. Mary Spears. Plan was to proceed  with more evaluation with flow evaluation and FFR (fractional flow  reserve) of the LAD and then decide accordingly after that.   This will  be dictated in a separate report.  Tapan Santiago M.D.    PCI: 11/2/17  Procedure Summary      Lesion found in the Prox LAD coronary artery with a stenosis of 50 %.   Fractional Flow Reserve in the proximal Left Anterior Descending was 0.92   after hyperemic response with adenosine.      Lesion found in the Distal LAD coronary artery with a stenosis of 70 %.   Fractional Flow Reserve in the distal Left Anterior Descending was 0.73   after hyperemic response with adenosine.    S/P successful PCI of the distal Left Anterior Descending Coronary Artery   with 2.75-28 mm drug eluting stent, post dilated up to 3.0 mm.      Recommendations      Patient and family were informed about the findings and their questions   were answered to their satisfaction.   The importance of lifestyle changes and cardiovascular risk factors   modification was emphasized.   The importance of compliance with medications was emphasized.   The patient will be on dual antiplatelet therapy for at least one year.   The patient will be on optimal medical therapy for atherosclerotic   disease, including beta blocker and statin.      Estimated Blood CZYZ:02 ml.      Complications:No complications.      Signatures      ----------------------------------------------------------------   Electronically signed by Osbaldo Zhang MD   Echo: 5/18/17    Findings      Mitral Valve   Mild mitral regurgitation is present.      Aortic Valve   The aortic valve leaflets were not well visualized.   Aortic valve appears tricuspid.   Aortic valve leaflets are somewhat thickened.      Tricuspid Valve   Tricuspid valve was not well visualized.   Mild tricuspid regurgitation.      Pulmonic Valve   The pulmonic valve was not well visualized .   Trivial pulmonic regurgitation visualized.      Left Atrium   Left atrial size was normal.      Left Ventricle   Ejection fraction is visually estimated at 60%.   Overall left ventricular function is normal.      Right Atrium   Right atrial size was normal.      Right Ventricle   The right ventricular size was normal with normal systolic function and   wall thickness.      Pericardial Effusion   The pericardium was normal in appearance with no evidence of a pericardial   effusion.      Pleural Effusion   No evidence of pleural effusion.      Aorta / Great Vessels   -Aortic root dimension within normal limits.   -The Pulmonary artery is within normal limits.   -IVC size is within normal limits with normal respiratory phasic changes.     Summary   Ejection fraction is visually estimated at 60%.   Overall left ventricular function is normal.      Signature      ----------------------------------------------------------------   Electronically signed by Clovis Dolan MD    Diagnosis Orders   1. Coronary artery disease involving native coronary artery of native heart without angina pectoris  EKG 12 Lead   2. Hypercholesteremia     3. Complex sleep apnea syndrome     4. Essential hypertension  EKG 12 Lead       Orders Placed This Encounter   Procedures    EKG 12 Lead     Order Specific Question:   Reason for Exam?     Answer: Other   Cardiac wise stable, no chest pain or sob with golfing and taking stairs or other daily activities. HR and BP stable. LDL-C 80. Discussed guideline recommendations and further lowering LDL- C to 70 or less with PCSK9 inhibitors in conjunction with heart healthy diet and exercise. He will think about it, he doesn't want to make any changes at this time. PCP follows lipids and hepatic panel.     Discussed use, benefit, and side effects of prescribed medications. All patient questions answered. Pt voiced understanding. Instructed to continue current medications, diet and exercise. Continue risk factor modification and medical management. Patient agreed with treatment plan. Follow up as directed.     Continue Dr Ella Cortez current treatment plan  Follow up with Dr Homero Mayberry as scheduled or sooner if needed

## 2019-06-25 ENCOUNTER — OFFICE VISIT (OUTPATIENT)
Dept: CARDIOLOGY CLINIC | Age: 69
End: 2019-06-25
Payer: COMMERCIAL

## 2019-06-25 VITALS
HEIGHT: 67 IN | DIASTOLIC BLOOD PRESSURE: 78 MMHG | WEIGHT: 154.2 LBS | BODY MASS INDEX: 24.2 KG/M2 | HEART RATE: 63 BPM | SYSTOLIC BLOOD PRESSURE: 122 MMHG

## 2019-06-25 DIAGNOSIS — I25.10 CORONARY ARTERY DISEASE INVOLVING NATIVE CORONARY ARTERY OF NATIVE HEART WITHOUT ANGINA PECTORIS: Primary | ICD-10-CM

## 2019-06-25 DIAGNOSIS — G47.31 COMPLEX SLEEP APNEA SYNDROME: ICD-10-CM

## 2019-06-25 DIAGNOSIS — E78.00 HYPERCHOLESTEREMIA: ICD-10-CM

## 2019-06-25 DIAGNOSIS — I10 ESSENTIAL HYPERTENSION: ICD-10-CM

## 2019-06-25 PROCEDURE — 93000 ELECTROCARDIOGRAM COMPLETE: CPT | Performed by: NURSE PRACTITIONER

## 2019-06-25 PROCEDURE — 99213 OFFICE O/P EST LOW 20 MIN: CPT | Performed by: NURSE PRACTITIONER

## 2019-07-24 DIAGNOSIS — E78.00 HYPERCHOLESTEREMIA: ICD-10-CM

## 2019-07-24 RX ORDER — ATORVASTATIN CALCIUM 40 MG/1
TABLET, FILM COATED ORAL
Qty: 90 TABLET | Refills: 0 | Status: SHIPPED | OUTPATIENT
Start: 2019-07-24 | End: 2020-09-10

## 2019-07-28 DIAGNOSIS — E78.00 HYPERCHOLESTEREMIA: ICD-10-CM

## 2019-07-30 RX ORDER — ATORVASTATIN CALCIUM 40 MG/1
TABLET, FILM COATED ORAL
Qty: 90 TABLET | Refills: 2 | Status: SHIPPED | OUTPATIENT
Start: 2019-07-30 | End: 2020-06-29 | Stop reason: SDUPTHER

## 2019-09-16 DIAGNOSIS — G47.10 HYPERSOMNIA: ICD-10-CM

## 2019-09-16 RX ORDER — METHYLPHENIDATE HYDROCHLORIDE 20 MG/1
20 TABLET ORAL 3 TIMES DAILY
Qty: 270 TABLET | Refills: 0 | Status: SHIPPED | OUTPATIENT
Start: 2019-09-16 | End: 2020-01-14 | Stop reason: SDUPTHER

## 2019-10-01 ENCOUNTER — TELEPHONE (OUTPATIENT)
Dept: FAMILY MEDICINE CLINIC | Age: 69
End: 2019-10-01

## 2019-10-21 ENCOUNTER — NURSE ONLY (OUTPATIENT)
Dept: FAMILY MEDICINE CLINIC | Age: 69
End: 2019-10-21

## 2019-10-21 DIAGNOSIS — Z23 NEED FOR INFLUENZA VACCINATION: Primary | ICD-10-CM

## 2019-10-21 PROCEDURE — 90688 IIV4 VACCINE SPLT 0.5 ML IM: CPT | Performed by: FAMILY MEDICINE

## 2019-10-21 PROCEDURE — 90471 IMMUNIZATION ADMIN: CPT | Performed by: FAMILY MEDICINE

## 2019-11-07 ENCOUNTER — OFFICE VISIT (OUTPATIENT)
Dept: FAMILY MEDICINE CLINIC | Age: 69
End: 2019-11-07

## 2019-11-07 VITALS
SYSTOLIC BLOOD PRESSURE: 148 MMHG | RESPIRATION RATE: 12 BRPM | DIASTOLIC BLOOD PRESSURE: 88 MMHG | HEART RATE: 72 BPM | BODY MASS INDEX: 23.17 KG/M2 | HEIGHT: 67 IN | WEIGHT: 147.6 LBS

## 2019-11-07 DIAGNOSIS — E78.00 HYPERCHOLESTEREMIA: ICD-10-CM

## 2019-11-07 DIAGNOSIS — L43.9 LICHEN PLANUS: ICD-10-CM

## 2019-11-07 DIAGNOSIS — L57.0 ACTINIC KERATOSIS: ICD-10-CM

## 2019-11-07 DIAGNOSIS — N40.0 BENIGN PROSTATIC HYPERPLASIA WITHOUT LOWER URINARY TRACT SYMPTOMS: ICD-10-CM

## 2019-11-07 DIAGNOSIS — I25.10 ASHD (ARTERIOSCLEROTIC HEART DISEASE): Primary | ICD-10-CM

## 2019-11-07 DIAGNOSIS — B35.1 TINEA UNGUIUM: ICD-10-CM

## 2019-11-07 DIAGNOSIS — K64.1 GRADE II HEMORRHOIDS: ICD-10-CM

## 2019-11-07 LAB
HEMOCCULT STL QL: NEGATIVE
HEMOCCULT STL QL: NORMAL
HEMOCCULT STL QL: NORMAL

## 2019-11-07 PROCEDURE — 99214 OFFICE O/P EST MOD 30 MIN: CPT | Performed by: FAMILY MEDICINE

## 2019-11-07 PROCEDURE — 17003 DESTRUCT PREMALG LES 2-14: CPT | Performed by: FAMILY MEDICINE

## 2019-11-07 PROCEDURE — 17000 DESTRUCT PREMALG LESION: CPT | Performed by: FAMILY MEDICINE

## 2019-11-07 PROCEDURE — 82270 OCCULT BLOOD FECES: CPT | Performed by: FAMILY MEDICINE

## 2019-11-07 RX ORDER — TERBINAFINE HYDROCHLORIDE 250 MG/1
250 TABLET ORAL DAILY
Qty: 30 TABLET | Refills: 2 | Status: SHIPPED | OUTPATIENT
Start: 2019-11-07 | End: 2020-07-16 | Stop reason: ALTCHOICE

## 2019-11-07 RX ORDER — FLUOCINONIDE GEL 0.5 MG/G
GEL TOPICAL
Qty: 15 G | Refills: 1 | Status: SHIPPED | OUTPATIENT
Start: 2019-11-07 | End: 2020-07-16 | Stop reason: SDUPTHER

## 2019-11-07 ASSESSMENT — ENCOUNTER SYMPTOMS
BACK PAIN: 0
COUGH: 0
CONSTIPATION: 0
ABDOMINAL PAIN: 0
NAUSEA: 0
EYE PAIN: 0
SHORTNESS OF BREATH: 0
TROUBLE SWALLOWING: 0
BLOOD IN STOOL: 0
SORE THROAT: 0
CHEST TIGHTNESS: 0

## 2019-11-13 LAB
ABSOLUTE BASO #: 0 X10E9/L (ref 0–0.9)
ABSOLUTE EOS #: 0.2 X10E9/L (ref 0–0.4)
ABSOLUTE LYMPH #: 1 X10E9/L (ref 1–3.5)
ABSOLUTE MONO #: 0.4 X10E9/L (ref 0–0.9)
ABSOLUTE NEUT #: 3.4 X10E9/L (ref 1.5–6.6)
ALBUMIN SERPL-MCNC: 4.2 G/DL (ref 3.2–5.3)
ALK PHOSPHATASE: 79 U/L (ref 39–130)
ALT SERPL-CCNC: 22 U/L (ref 0–40)
ANION GAP SERPL CALCULATED.3IONS-SCNC: 8 MMOL/L (ref 4–12)
AST SERPL-CCNC: 17 U/L (ref 0–41)
BASOPHILS RELATIVE PERCENT: 0.8 %
BILIRUB SERPL-MCNC: 0.8 MG/DL (ref 0.3–1.2)
BUN BLDV-MCNC: 26 MG/DL (ref 5–27)
CALCIUM SERPL-MCNC: 8.8 MG/DL (ref 8.5–10.5)
CHLORIDE BLD-SCNC: 109 MMOL/L (ref 98–109)
CHOLESTEROL/HDL RATIO: 2.6 (ref 1–5)
CHOLESTEROL: 148 MG/DL (ref 150–200)
CO2: 28 MMOL/L (ref 22–32)
CREAT SERPL-MCNC: 0.92 MG/DL (ref 0.6–1.3)
EGFR AFRICAN AMERICAN: >60 ML/MIN/1.73SQ.M
EGFR IF NONAFRICAN AMERICAN: >60 ML/MIN/1.73SQ.M
EOSINOPHILS RELATIVE PERCENT: 4.3 %
GLUCOSE: 100 MG/DL (ref 65–99)
HCT VFR BLD CALC: 47.4 % (ref 37–51)
HDLC SERPL-MCNC: 56 MG/DL
HEMOGLOBIN: 16.6 G/DL (ref 12.6–17.4)
LDL CHOLESTEROL CALCULATED: 83 MG/DL
LDL/HDL RATIO: 1.5
LYMPHOCYTE %: 18.9 %
MCH RBC QN AUTO: 34 PG (ref 27–35)
MCHC RBC AUTO-ENTMCNC: 35.1 G/DL (ref 31–36)
MCV RBC AUTO: 97 FL (ref 81–101)
MONOCYTES # BLD: 8.8 %
NEUTROPHILS RELATIVE PERCENT: 67.2 %
PDW BLD-RTO: 12.8 % (ref 11.4–14.3)
PLATELETS: 208 X10E9/L (ref 150–450)
PMV BLD AUTO: 7.6 FL (ref 7–12)
POTASSIUM SERPL-SCNC: 4.4 MMOL/L (ref 3.5–5)
PSA, ULTRASENSITIVE: 2.99 NG/ML (ref 0–4)
RBC: 4.89 X10E12/L (ref 3.9–5.8)
SODIUM BLD-SCNC: 145 MMOL/L (ref 134–146)
TOTAL PROTEIN: 6.3 G/DL (ref 6–8)
TRIGL SERPL-MCNC: 47 MG/DL (ref 27–150)
TSH SERPL DL<=0.05 MIU/L-ACNC: 1.59 UIU/ML (ref 0.49–4.67)
VLDLC SERPL CALC-MCNC: 9 MG/DL (ref 0–30)
WBC: 5.1 X10E9/L (ref 4.4–12)

## 2019-11-18 ENCOUNTER — TELEPHONE (OUTPATIENT)
Dept: FAMILY MEDICINE CLINIC | Age: 69
End: 2019-11-18

## 2019-11-19 ENCOUNTER — TELEPHONE (OUTPATIENT)
Dept: FAMILY MEDICINE CLINIC | Age: 69
End: 2019-11-19

## 2019-12-06 ENCOUNTER — NURSE ONLY (OUTPATIENT)
Dept: FAMILY MEDICINE CLINIC | Age: 69
End: 2019-12-06

## 2019-12-06 DIAGNOSIS — Z23 NEED FOR SHINGLES VACCINE: Primary | ICD-10-CM

## 2019-12-06 PROCEDURE — 90471 IMMUNIZATION ADMIN: CPT | Performed by: FAMILY MEDICINE

## 2019-12-06 PROCEDURE — 90750 HZV VACC RECOMBINANT IM: CPT | Performed by: FAMILY MEDICINE

## 2019-12-19 ENCOUNTER — OFFICE VISIT (OUTPATIENT)
Dept: PULMONOLOGY | Age: 69
End: 2019-12-19
Payer: COMMERCIAL

## 2019-12-19 VITALS
SYSTOLIC BLOOD PRESSURE: 132 MMHG | DIASTOLIC BLOOD PRESSURE: 84 MMHG | HEIGHT: 67 IN | BODY MASS INDEX: 23.86 KG/M2 | WEIGHT: 152 LBS | HEART RATE: 68 BPM | OXYGEN SATURATION: 99 %

## 2019-12-19 DIAGNOSIS — G47.10 HYPERSOMNIA: ICD-10-CM

## 2019-12-19 DIAGNOSIS — G47.31 COMPLEX SLEEP APNEA SYNDROME: Primary | ICD-10-CM

## 2019-12-19 PROCEDURE — 99213 OFFICE O/P EST LOW 20 MIN: CPT | Performed by: PHYSICIAN ASSISTANT

## 2019-12-19 ASSESSMENT — ENCOUNTER SYMPTOMS
COUGH: 0
CHEST TIGHTNESS: 0
WHEEZING: 0
ALLERGIC/IMMUNOLOGIC NEGATIVE: 1
NAUSEA: 0
EYES NEGATIVE: 1
BACK PAIN: 0
STRIDOR: 0
SHORTNESS OF BREATH: 0
DIARRHEA: 0

## 2019-12-23 LAB
ABSOLUTE BASO #: 0.1 X10E9/L (ref 0–0.9)
ABSOLUTE EOS #: 0.2 X10E9/L (ref 0–0.4)
ABSOLUTE LYMPH #: 1.2 X10E9/L (ref 1–3.5)
ABSOLUTE MONO #: 0.4 X10E9/L (ref 0–0.9)
ABSOLUTE NEUT #: 2.7 X10E9/L (ref 1.5–6.6)
ALT SERPL-CCNC: 26 U/L (ref 0–40)
AST SERPL-CCNC: 22 U/L (ref 0–41)
BASOPHILS RELATIVE PERCENT: 2.2 %
EOSINOPHILS RELATIVE PERCENT: 4 %
HCT VFR BLD CALC: 48 % (ref 37–51)
HEMOGLOBIN: 16.6 G/DL (ref 12.6–17.4)
LYMPHOCYTE %: 26.5 %
MCH RBC QN AUTO: 33 PG (ref 27–35)
MCHC RBC AUTO-ENTMCNC: 34.5 G/DL (ref 31–36)
MCV RBC AUTO: 96 FL (ref 81–101)
MONOCYTES # BLD: 8.7 %
NEUTROPHILS RELATIVE PERCENT: 58.6 %
PDW BLD-RTO: 12.7 % (ref 11.4–14.3)
PLATELETS: 257 X10E9/L (ref 150–450)
PMV BLD AUTO: 7.3 FL (ref 7–12)
RBC: 5.01 X10E12/L (ref 3.9–5.8)
WBC: 4.7 X10E9/L (ref 4.4–12)

## 2019-12-26 ENCOUNTER — TELEPHONE (OUTPATIENT)
Dept: FAMILY MEDICINE CLINIC | Age: 69
End: 2019-12-26

## 2020-01-13 NOTE — TELEPHONE ENCOUNTER
[facility] called requesting a refill on the following medications:  Requested Prescriptions     Pending Prescriptions Disp Refills    methylphenidate (RITALIN) 20 MG tablet 270 tablet 0     Sig: Take 1 tablet by mouth 3 times daily for 90 days.      Pharmacy verified:  [facility]    Date of last visit: 12/19/19  Date of next visit (if applicable): 90/36/05

## 2020-01-14 RX ORDER — METHYLPHENIDATE HYDROCHLORIDE 20 MG/1
20 TABLET ORAL 3 TIMES DAILY
Qty: 270 TABLET | Refills: 0 | Status: SHIPPED | OUTPATIENT
Start: 2020-01-14 | End: 2020-05-07 | Stop reason: SDUPTHER

## 2020-01-30 ENCOUNTER — HOSPITAL ENCOUNTER (OUTPATIENT)
Dept: AUDIOLOGY | Age: 70
Discharge: HOME | End: 2020-01-30

## 2020-01-30 PROCEDURE — V5014 HEARING AID REPAIR/MODIFYING: HCPCS | Performed by: AUDIOLOGIST

## 2020-01-30 NOTE — PROGRESS NOTES
ACCOUNT #: [de-identified]    DIAGNOSIS: Sensorineural hearing loss of both ears. HEARING AID PROBLEM: Right hearing aid is weak- replaced microphone cover and the output improved. Charged $10.00 for the repair.

## 2020-03-10 ENCOUNTER — HOSPITAL ENCOUNTER (OUTPATIENT)
Dept: AUDIOLOGY | Age: 70
Discharge: HOME | End: 2020-03-10

## 2020-03-10 ENCOUNTER — NURSE ONLY (OUTPATIENT)
Dept: FAMILY MEDICINE CLINIC | Age: 70
End: 2020-03-10

## 2020-03-10 PROCEDURE — 90471 IMMUNIZATION ADMIN: CPT | Performed by: FAMILY MEDICINE

## 2020-03-10 PROCEDURE — 90750 HZV VACC RECOMBINANT IM: CPT | Performed by: FAMILY MEDICINE

## 2020-03-10 PROCEDURE — V5014 HEARING AID REPAIR/MODIFYING: HCPCS | Performed by: AUDIOLOGIST

## 2020-03-10 ASSESSMENT — PATIENT HEALTH QUESTIONNAIRE - PHQ9
SUM OF ALL RESPONSES TO PHQ QUESTIONS 1-9: 0
SUM OF ALL RESPONSES TO PHQ9 QUESTIONS 1 & 2: 0
SUM OF ALL RESPONSES TO PHQ QUESTIONS 1-9: 0
1. LITTLE INTEREST OR PLEASURE IN DOING THINGS: 0
2. FEELING DOWN, DEPRESSED OR HOPELESS: 0

## 2020-05-07 RX ORDER — METHYLPHENIDATE HYDROCHLORIDE 20 MG/1
20 TABLET ORAL 3 TIMES DAILY
Qty: 270 TABLET | Refills: 0 | Status: SHIPPED | OUTPATIENT
Start: 2020-05-07 | End: 2020-08-05

## 2020-05-20 ENCOUNTER — TELEPHONE (OUTPATIENT)
Dept: FAMILY MEDICINE CLINIC | Age: 70
End: 2020-05-20

## 2020-05-20 ENCOUNTER — HOSPITAL ENCOUNTER (OUTPATIENT)
Age: 70
Discharge: HOME | End: 2020-05-20
Payer: COMMERCIAL

## 2020-05-20 PROCEDURE — U0002 COVID-19 LAB TEST NON-CDC: HCPCS

## 2020-05-20 NOTE — TELEPHONE ENCOUNTER
Pt called stating he needs an order for a covid test so he can enter with his uncle into the [facility] for his appt. 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 Pt ask where he can get test done at, prefers non [facility].     Please call pt once addressed  [phone]

## 2020-05-21 LAB
PERFORMING LAB: NORMAL
REPORT: NORMAL
SARS-COV-2: NOT DETECTED

## 2020-05-22 ENCOUNTER — TELEPHONE (OUTPATIENT)
Dept: FAMILY MEDICINE CLINIC | Age: 70
End: 2020-05-22

## 2020-06-29 ENCOUNTER — OFFICE VISIT (OUTPATIENT)
Dept: CARDIOLOGY CLINIC | Age: 70
End: 2020-06-29
Payer: COMMERCIAL

## 2020-06-29 VITALS
SYSTOLIC BLOOD PRESSURE: 132 MMHG | HEART RATE: 72 BPM | DIASTOLIC BLOOD PRESSURE: 80 MMHG | BODY MASS INDEX: 23.07 KG/M2 | WEIGHT: 147 LBS | HEIGHT: 67 IN

## 2020-06-29 PROCEDURE — 99213 OFFICE O/P EST LOW 20 MIN: CPT | Performed by: NUCLEAR MEDICINE

## 2020-06-29 PROCEDURE — 93000 ELECTROCARDIOGRAM COMPLETE: CPT | Performed by: NUCLEAR MEDICINE

## 2020-06-29 NOTE — PROGRESS NOTES
[address] [address]  Dept: [phone]  Dept Fax: [phone]  Loc: [phone]    Visit Date: 6/29/2020    Sheeba Banda is a 77 y.o. male who presents todayfor:  Chief Complaint   Patient presents with    Check-Up    Coronary Artery Disease    Hypertension    Hyperlipidemia   known LAD stent   No chest pain   No changes in breathing  He is active   No new symptoms  BP is stable  No dizziness  No syncope  On statins for hyperlipidemia      HPI:  HPI  Past Medical History:   Diagnosis Date    Actinic keratoses     Arthritis     hands    ASHD (arteriosclerotic heart disease)     Carotid disease, bilateral ([address] 2017    Colon polyps 5/07    Hypercholesteremia 7/97    Narcolepsy     Rotator cuff (capsule) sprain     Tobacco abuse     Unspecified sleep apnea       Past Surgical History:   Procedure Laterality Date    APPENDECTOMY      CATARACT REMOVAL Bilateral 2016    Right- Oct, Left- [facility]- [facility]     COLONOSCOPY  2007,     sheik    CORONARY ANGIOPLASTY WITH STENT PLACEMENT  7/05    CORONARY ANGIOPLASTY WITH STENT PLACEMENT  11/2017    2 nd stent to LAD    HEMORRHOID SURGERY  09/2016    Dr Jona Go, every 2 weeks x 3 times     Harlin Clipper Left 2015    Dr Ingrid Rebolledo @ [facility]   rt 2015    TONSILLECTOMY       Family History   Problem Relation Age of Onset    Stroke Mother     Diabetes Mother     Heart Disease Father     Asthma Father      Social History     Tobacco Use    Smoking status: Former Smoker     Packs/day: 1.00     Years: 45.00     Pack years: 45.00     Types: Cigarettes     Last attempt to quit: 7/2/2005     Years since quitting: 15.0    Smokeless tobacco: Never Used   Substance Use Topics    Alcohol use: Yes     Comment: social      Current Outpatient Medications   Medication Sig Dispense Refill    methylphenidate (RITALIN) 20 MG tablet Take 1 tablet by mouth 3 times daily for 90 days. 270 tablet 0    fluocinonide (LIDEX) 0.05 % gel Apply topically 2 times daily. 15 g 1    terbinafine (LAMISIL) 250 MG tablet Take 1 tablet by mouth daily 30 tablet 2    atorvastatin (LIPITOR) 40 MG tablet TAKE 1 TABLET BY MOUTH ONE TIME A DAY  90 tablet 0    ipratropium (ATROVENT) 0.06 % nasal spray 2 sprays by Nasal route daily as needed       cetirizine (ZYRTEC) 10 MG tablet Take 10 mg by mouth daily       NONFORMULARY Macula pro, 1 tablet BID      B Complex Vitamins (VITAMIN-B COMPLEX PO) Take 1 tablet by mouth daily.  Calcium Carbonate-Vitamin D (CALCIUM 500/VITAMIN D PO) Take 1 tablet by mouth daily.  aspirin 81 MG chewable tablet Take 81 mg by mouth daily.  nitroGLYCERIN (NITROSTAT) 0.4 MG SL tablet Place 1 tablet under the tongue every 5 minutes as needed for Chest pain up to max of 3 total doses. If no relief after 1 dose, call 911. (Patient not taking: Reported on 6/29/2020) 25 tablet 3     No current facility-administered medications for this visit.       No Known Allergies  Health Maintenance   Topic Date Due    DTaP/Tdap/Td vaccine (1 - Tdap) 08/20/1970    Low dose CT lung screening  08/20/2006    Annual Wellness Visit (AWV)  05/29/2019    Lipid screen  11/13/2020    PSA counseling  11/13/2020    Colon cancer screen colonoscopy  12/05/2022    Flu vaccine  Completed    Shingles Vaccine  Completed    Pneumococcal 65+ years Vaccine  Completed    AAA screen  Completed    Hepatitis C screen  Completed    Hepatitis A vaccine  Aged Out    Hepatitis B vaccine  Aged Out    Hib vaccine  Aged Out    Meningococcal (ACWY) vaccine  Aged Out       Subjective:  Review of Systems  General:   No fever, no chills, No fatigue or weight loss  Pulmonary:    No dyspnea, no wheezing  Cardiac:    Denies recent chest pain,   GI:     No nausea or vomiting, no abdominal pain  Neuro:    No dizziness or light headedness,   Musculoskeletal:  No recent active issues  Extremities:   No edema, no obvious claudication       Objective:  Physical Exam  /80   Pulse 72   Ht 5' 7\" (1.702 m)   Wt 147 lb (66.7 kg)   BMI 23.02 kg/m²   General:   Well developed, well nourished  Lungs:   Clear to auscultation  Heart:    Normal S1 S2, Slight murmur. no rubs, no gallops  Abdomen:   Soft, non tender, no organomegalies, positive bowel sounds  Extremities:   No edema, no cyanosis, good peripheral pulses  Neurological:   Awake, alert, oriented. No obvious focal deficits  Musculoskelatal:  No obvious deformities    Assessment:      Diagnosis Orders   1. Coronary artery disease involving native coronary artery of native heart without angina pectoris  EKG 12 lead   2. Hypercholesteremia  EKG 12 lead   3. Essential hypertension     cardiac fair for now   ECG in office was done today. I reviewed the ECG. No acute findings      Plan:  No follow-ups on file. As above  Continue risk factor modification and medical management  Thank you for allowing me to participate in the care of your patient. Please don't hesitate to contact me regarding any further issues related to the patient care    Orders Placed:  Orders Placed This Encounter   Procedures    EKG 12 lead     Order Specific Question:   Reason for Exam?     Answer: Other       Medications Prescribed:  No orders of the defined types were placed in this encounter. Discussed use, benefit, and side effects of prescribed medications. All patient questions answered. Pt voicedunderstanding. Instructed to continue current medications, diet and exercise. Continue risk factor modification and medical management. Patient agreed with treatment plan. Follow up as directed.     Electronically signedby Esther Ray MD on 6/29/2020 at 8:59 AM

## 2020-07-16 ENCOUNTER — OFFICE VISIT (OUTPATIENT)
Dept: FAMILY MEDICINE CLINIC | Age: 70
End: 2020-07-16

## 2020-07-16 VITALS
WEIGHT: 150 LBS | BODY MASS INDEX: 23.54 KG/M2 | DIASTOLIC BLOOD PRESSURE: 62 MMHG | RESPIRATION RATE: 14 BRPM | TEMPERATURE: 97.9 F | HEART RATE: 72 BPM | SYSTOLIC BLOOD PRESSURE: 108 MMHG | HEIGHT: 67 IN

## 2020-07-16 PROCEDURE — 99213 OFFICE O/P EST LOW 20 MIN: CPT | Performed by: FAMILY MEDICINE

## 2020-07-16 RX ORDER — FLUOCINONIDE GEL 0.5 MG/G
GEL TOPICAL
Qty: 15 G | Refills: 1 | Status: SHIPPED | OUTPATIENT
Start: 2020-07-16 | End: 2021-03-15 | Stop reason: SDUPTHER

## 2020-07-16 ASSESSMENT — ENCOUNTER SYMPTOMS
CHEST TIGHTNESS: 0
TROUBLE SWALLOWING: 0
COUGH: 0
NAUSEA: 0
SORE THROAT: 0
ABDOMINAL PAIN: 0
CONSTIPATION: 0
BACK PAIN: 0
EYE PAIN: 0
SHORTNESS OF BREATH: 0
BLOOD IN STOOL: 0

## 2020-07-16 NOTE — PROGRESS NOTES
Subjective:      Patient ID: Elva Camargo is a 77 y.o. male. ashd   no  Chest  Pain       Narcolepsy   Sleep  And  Stable     Hyperlipidemia   This is a chronic problem. The current episode started more than 1 year ago. The problem is controlled. Recent lipid tests were reviewed and are normal. Pertinent negatives include no chest pain, focal sensory loss, focal weakness, leg pain, myalgias or shortness of breath. Current antihyperlipidemic treatment includes statins. The current treatment provides significant improvement of lipids. There are no compliance problems. Past Medical History:   Diagnosis Date    Actinic keratoses     Arthritis     hands    ASHD (arteriosclerotic heart disease)     Carotid disease, bilateral ([facility] [address] 2017    Colon polyps 5/07    Hypercholesteremia 7/97    Narcolepsy     Rotator cuff (capsule) sprain     Tobacco abuse     Unspecified sleep apnea      Review of Systems   Constitutional: Negative for fatigue and fever. HENT: Negative for congestion, ear pain, postnasal drip, sore throat and trouble swallowing. Eyes: Negative for pain. Respiratory: Negative for cough, chest tightness and shortness of breath. Cardiovascular: Negative for chest pain, palpitations and leg swelling. Gastrointestinal: Negative for abdominal pain, blood in stool, constipation and nausea. Genitourinary: Negative for difficulty urinating, frequency and urgency. Musculoskeletal: Negative for arthralgias, back pain, joint swelling, myalgias and neck stiffness. Skin: Negative for rash. Neurological: Negative for dizziness, focal weakness, weakness and headaches. Hematological: Negative for adenopathy. Does not bruise/bleed easily. Psychiatric/Behavioral: Negative for behavioral problems, dysphoric mood and sleep disturbance.      /62 (Site: Right Upper Arm, Position: Sitting, Cuff Size: Medium Adult)   Pulse 72   Temp 97.9 °F (36.6 °C) (Oral)   Resp 14   Ht 5' 7\" (1.702 m)   Wt 150 lb (68 kg)   BMI 23.49 kg/m²   Objective:   Physical Exam  Vitals signs and nursing note reviewed. Constitutional:       Appearance: He is well-developed. HENT:      Head: Normocephalic and atraumatic. Right Ear: External ear normal.      Left Ear: External ear normal.      Nose: Nose normal.   Eyes:      Conjunctiva/sclera: Conjunctivae normal.      Pupils: Pupils are equal, round, and reactive to light. Comments: Fundi nl   Neck:      Musculoskeletal: Normal range of motion and neck supple. Thyroid: No thyromegaly. Cardiovascular:      Rate and Rhythm: Normal rate and regular rhythm. Heart sounds: Normal heart sounds. Pulmonary:      Effort: Pulmonary effort is normal.      Breath sounds: Normal breath sounds. No wheezing or rales. Abdominal:      General: Bowel sounds are normal.      Palpations: Abdomen is soft. There is no mass. Tenderness: There is no abdominal tenderness. Musculoskeletal: Normal range of motion. Lymphadenopathy:      Cervical: No cervical adenopathy. Skin:     General: Skin is warm and dry. Findings: No rash. Neurological:      Mental Status: He is alert and oriented to person, place, and time. Cranial Nerves: No cranial nerve deficit. Deep Tendon Reflexes: Reflexes are normal and symmetric. Assessment:       Diagnosis Orders   1. ASHD (arteriosclerotic heart disease)  TSH with Reflex    Comprehensive Metabolic Panel    CBC Auto Differential   2. Lichen planus  fluocinonide (LIDEX) 0.05 % gel   3. Complex sleep apnea syndrome     4. Central sleep apnea due to Cheyne-Santos respiration     5. Hypercholesteremia  Lipid Panel   6. Primary narcolepsy without cataplexy  TSH with Reflex   7.  Benign prostatic hyperplasia without lower urinary tract symptoms  PSA, Prostatic Specific Antigen         Plan:      Current Outpatient Medications   Medication Sig Dispense Refill    Multiple Vitamins-Minerals (ICAPS AREDS 2 PO) Take 1 capsule by mouth 2 times daily      fluocinonide (LIDEX) 0.05 % gel Apply topically 2 times daily. 15 g 1    methylphenidate (RITALIN) 20 MG tablet Take 1 tablet by mouth 3 times daily for 90 days. 270 tablet 0    atorvastatin (LIPITOR) 40 MG tablet TAKE 1 TABLET BY MOUTH ONE TIME A DAY  90 tablet 0    nitroGLYCERIN (NITROSTAT) 0.4 MG SL tablet Place 1 tablet under the tongue every 5 minutes as needed for Chest pain up to max of 3 total doses. If no relief after 1 dose, call 911. 25 tablet 3    ipratropium (ATROVENT) 0.06 % nasal spray 2 sprays by Nasal route daily as needed       cetirizine (ZYRTEC) 10 MG tablet Take 10 mg by mouth daily       B Complex Vitamins (VITAMIN-B COMPLEX PO) Take 1 tablet by mouth daily.  Calcium Carbonate-Vitamin D (CALCIUM 500/VITAMIN D PO) Take 1 tablet by mouth daily.  aspirin 81 MG chewable tablet Take 81 mg by mouth daily. No current facility-administered medications for this visit.           Orders Placed This Encounter   Procedures    TSH with Reflex     Standing Status:   Future     Standing Expiration Date:   7/16/2021    Lipid Panel     Standing Status:   Future     Standing Expiration Date:   7/16/2021     Order Specific Question:   Is Patient Fasting?/# of Hours     Answer:   YES 12 HOURS    Comprehensive Metabolic Panel     Standing Status:   Future     Standing Expiration Date:   7/16/2021    CBC Auto Differential     Standing Status:   Future     Standing Expiration Date:   7/16/2021    PSA, Prostatic Specific Antigen     Standing Status:   Future     Standing Expiration Date:   7/16/2021       See in  December   Karolina Bourgeois MD

## 2020-08-31 ENCOUNTER — TELEPHONE (OUTPATIENT)
Dept: PULMONOLOGY | Age: 70
End: 2020-08-31

## 2020-08-31 RX ORDER — METHYLPHENIDATE HYDROCHLORIDE 20 MG/1
20 TABLET ORAL 3 TIMES DAILY
Qty: 270 TABLET | Refills: 0 | Status: SHIPPED | OUTPATIENT
Start: 2020-08-31 | End: 2020-11-29

## 2020-08-31 NOTE — TELEPHONE ENCOUNTER
Patient called needing a refill on his Ritalin 20 mg 3 tablets daily. 3 month supply with 3 refills. Send to [facility].

## 2020-09-10 RX ORDER — ATORVASTATIN CALCIUM 40 MG/1
TABLET, FILM COATED ORAL
Qty: 90 TABLET | Refills: 0 | Status: SHIPPED | OUTPATIENT
Start: 2020-09-10 | End: 2020-12-11 | Stop reason: SDUPTHER

## 2020-11-16 LAB
ALBUMIN SERPL-MCNC: 4.6 G/DL (ref 3.2–5.3)
ALK PHOSPHATASE: 87 U/L (ref 39–130)
ALT SERPL-CCNC: 21 U/L (ref 0–40)
ANION GAP SERPL CALCULATED.3IONS-SCNC: 9 MMOL/L (ref 5–15)
AST SERPL-CCNC: 21 U/L (ref 0–41)
BANDS PRESENT: 1 %
BASOPHILS # BLD: 1 %
BASOPHILS ABSOLUTE: 0 X10E9/L (ref 0–0.2)
BILIRUB SERPL-MCNC: 1.2 MG/DL (ref 0.3–1.2)
BUN BLDV-MCNC: 24 MG/DL (ref 5–27)
CALCIUM SERPL-MCNC: 9.3 MG/DL (ref 8.5–10.5)
CHLORIDE BLD-SCNC: 106 MMOL/L (ref 98–109)
CHOLESTEROL/HDL RATIO: 2.5 (ref 1–5)
CHOLESTEROL: 151 MG/DL (ref 150–200)
CO2: 29 MMOL/L (ref 22–32)
CREAT SERPL-MCNC: 1.16 MG/DL (ref 0.6–1.3)
EGFR AFRICAN AMERICAN: >60 ML/MIN/1.73SQ.M
EGFR IF NONAFRICAN AMERICAN: >60 ML/MIN/1.73SQ.M
EOSINOPHIL # BLD: 4.9 %
EOSINOPHILS ABSOLUTE: 0.2 X10E9/L (ref 0–0.4)
GLUCOSE: 107 MG/DL (ref 65–99)
HCT VFR BLD CALC: 51 % (ref 39–49)
HDLC SERPL-MCNC: 61 MG/DL
HEMOGLOBIN: 17.4 G/DL (ref 13–17)
LDL CHOLESTEROL CALCULATED: 78 MG/DL
LDL/HDL RATIO: 1.3
LYMPHOCYTES # BLD: 17.6 %
LYMPHOCYTES ABSOLUTE: 0.8 X10E9/L (ref 1–3.5)
MCH RBC QN AUTO: 32.1 PG (ref 27–34)
MCHC RBC AUTO-ENTMCNC: 34.2 G/DL (ref 32–36)
MCV RBC AUTO: 94 FL (ref 80–100)
MONOCYTES # BLD: 14.7 %
MONOCYTES ABSOLUTE: 0.7 X10E9/L (ref 0–0.9)
NEUTROPHILS ABSOLUTE: 2.9 X10E9/L (ref 1.5–6.6)
NEUTROPHILS RELATIVE PERCENT: 60.8 %
PDW BLD-RTO: 12.5 % (ref 11.5–15)
PLATELETS: 232 X10E9/L (ref 150–450)
PMV BLD AUTO: 7.8 FL (ref 7–12)
POTASSIUM SERPL-SCNC: 4.1 MMOL/L (ref 3.5–5)
PSA, ULTRASENSITIVE: 3.53 NG/ML (ref 0–4)
RBC # BLD: ABNORMAL 10*6/UL
RBC: 5.42 X10E12/L (ref 4.1–5.7)
SODIUM BLD-SCNC: 144 MMOL/L (ref 134–146)
TOTAL PROTEIN: 7.1 G/DL (ref 6–8)
TRIGL SERPL-MCNC: 62 MG/DL (ref 27–150)
TSH SERPL DL<=0.05 MIU/L-ACNC: 2.87 UIU/ML (ref 0.49–4.67)
VLDLC SERPL CALC-MCNC: 12 MG/DL (ref 0–30)
WBC: 4.8 X10E9/L (ref 4–11)

## 2020-11-17 ENCOUNTER — TELEPHONE (OUTPATIENT)
Dept: FAMILY MEDICINE CLINIC | Age: 70
End: 2020-11-17

## 2020-11-17 NOTE — TELEPHONE ENCOUNTER
----- Message from Barb Ferrell MD sent at 11/17/2020  8:25 AM EST -----  Call all lab normal and keep  Next  appointment

## 2020-12-11 NOTE — TELEPHONE ENCOUNTER
Bethena Leyden called requesting a refill of the below medication which has been pended for you:     Requested Prescriptions     Pending Prescriptions Disp Refills    atorvastatin (LIPITOR) 40 MG tablet 90 tablet 0     Sig: TAKE 1 TABLET BY MOUTH ONE TIME A DAY       Last Appointment Date: 7/16/2020  Next Appointment Date: 12/22/2020    No Known Allergies     Please send to :    Edel Cordova

## 2020-12-12 RX ORDER — ATORVASTATIN CALCIUM 40 MG/1
TABLET, FILM COATED ORAL
Qty: 90 TABLET | Refills: 1 | Status: SHIPPED | OUTPATIENT
Start: 2020-12-12 | End: 2021-06-08

## 2020-12-21 ENCOUNTER — OFFICE VISIT (OUTPATIENT)
Dept: PULMONOLOGY | Age: 70
End: 2020-12-21
Payer: COMMERCIAL

## 2020-12-21 VITALS
HEIGHT: 67 IN | BODY MASS INDEX: 24.48 KG/M2 | HEART RATE: 74 BPM | SYSTOLIC BLOOD PRESSURE: 122 MMHG | OXYGEN SATURATION: 99 % | DIASTOLIC BLOOD PRESSURE: 70 MMHG | WEIGHT: 156 LBS

## 2020-12-21 PROCEDURE — 99213 OFFICE O/P EST LOW 20 MIN: CPT | Performed by: PHYSICIAN ASSISTANT

## 2020-12-21 RX ORDER — METHYLPHENIDATE HYDROCHLORIDE 20 MG/1
20 TABLET ORAL 3 TIMES DAILY
Qty: 270 TABLET | Refills: 0 | Status: SHIPPED | OUTPATIENT
Start: 2020-12-21 | End: 2021-01-20

## 2020-12-21 RX ORDER — METHYLPHENIDATE HYDROCHLORIDE 10 MG/1
20 TABLET ORAL 2 TIMES DAILY
COMMUNITY
End: 2020-12-21

## 2020-12-21 ASSESSMENT — ENCOUNTER SYMPTOMS
NAUSEA: 0
ALLERGIC/IMMUNOLOGIC NEGATIVE: 1
SHORTNESS OF BREATH: 0
CHEST TIGHTNESS: 0
EYES NEGATIVE: 1
WHEEZING: 0
BACK PAIN: 0
STRIDOR: 0
COUGH: 0
DIARRHEA: 0

## 2020-12-21 NOTE — PROGRESS NOTES
[facility]      Deo Haro         [id number]  12/21/2020   Chief Complaint   Patient presents with    Follow-up     CSA 1 year follow up with a download        Pt of Dr. Tez KOVACS Download:   Original or initial AHI: 14     Date of initial study: 12/27/00      Compliant  100%     Noncompliant 0 %     PAP Type ASVAuto Level  2/15 cmH20   Avg Hrs/Day 7:45  AHI: 0.2   Recorded compliance dates , 11/21/20  to 12/20/20   Machine/Mfg:   [x] ResMed    [] Respironics/Dreamstation   Interface:   [x] Nasal    [] Nasal pillows   [] FFM      Provider:      [] -CIPRIANO     []Anca     [] Ganesh    [x] Varun Collier    [] Ananya               [] [facility]      [] Adaptive    [] [address]      [] Other    Neck Size: 13.75  Mallampati Mallampati 3  ESS:  5  SAQLI:[id number]     Here is a scan of the most recent download:              Presentation:   Dewey Albrecht presents for sleep medicine follow up for complex sleep apnea  Since the last visit, Dewey Albrecht is doing well with PAP. He is taking Ritalin 2-3 times a day for hypersomnia and foggy thinking. He is sleeping well. Equipment issues: The pressure is  acceptable, the mask is acceptable     Sleep issues:  Do you feel better? Yes  More rested? Yes   Better concentration? yes    Progress History:   Since last visit any new medical issues? No  New ER or hospitlal visits? No  Any new or changes in medicines? No  Any new sleep medicines?  No        Past Medical History:   Diagnosis Date    Actinic keratoses     Angina effort      Replacing deprecated diagnoses    Arthritis     hands    ASHD (arteriosclerotic heart disease)     Carotid disease, bilateral ([facility] [address] 2017    Colon polyps 5/07    Hypercholesteremia 7/97    Narcolepsy     Rotator cuff (capsule) sprain     Tobacco abuse     Unspecified sleep apnea        Past Surgical History:   Procedure Laterality Date    APPENDECTOMY      CATARACT REMOVAL Bilateral 2016 HENT: Negative for congestion and postnasal drip. Eyes: Negative. Respiratory: Negative for cough, chest tightness, shortness of breath, wheezing and stridor. Cardiovascular: Negative for chest pain and leg swelling. Gastrointestinal: Negative for diarrhea and nausea. Endocrine: Negative. Genitourinary: Negative. Musculoskeletal: Negative. Negative for arthralgias and back pain. Skin: Negative. Allergic/Immunologic: Negative. Neurological: Negative. Negative for dizziness and light-headedness. Psychiatric/Behavioral: Negative. All other systems reviewed and are negative. Physical Exam:    BMI:  Body mass index is 24.43 kg/m². Wt Readings from Last 3 Encounters:   12/21/20 156 lb (70.8 kg)   07/16/20 150 lb (68 kg)   06/29/20 147 lb (66.7 kg)     Weight stable / unchanged  Vitals: /70 (Site: Right Upper Arm, Position: Sitting, Cuff Size: Medium Adult)   Pulse 74   Ht 5' 7\" (1.702 m)   Wt 156 lb (70.8 kg)   SpO2 99% Comment: on room air at rest  BMI 24.43 kg/m²       Physical Exam  Constitutional:       Appearance: He is well-developed. HENT:      Head: Normocephalic and atraumatic. Right Ear: External ear normal.      Left Ear: External ear normal.   Eyes:      Conjunctiva/sclera: Conjunctivae normal.      Pupils: Pupils are equal, round, and reactive to light. Neck:      Musculoskeletal: Normal range of motion and neck supple. Cardiovascular:      Rate and Rhythm: Normal rate and regular rhythm. Heart sounds: Normal heart sounds. Pulmonary:      Effort: Pulmonary effort is normal.      Breath sounds: Normal breath sounds. Musculoskeletal: Normal range of motion. Skin:     General: Skin is warm and dry. Neurological:      Mental Status: He is alert and oriented to person, place, and time. Psychiatric:         Behavior: Behavior normal.         Thought Content:  Thought content normal.         Judgment: Judgment normal. ECHO 05/18/2017    Summary   Ejection fraction is visually estimated at 60%. Overall left ventricular function is normal.    ASSESSMENT/DIAGNOSIS     Diagnosis Orders   1. Complex sleep apnea syndrome     2. Hypersomnia              Plan   Do you need any equipment today? Yes update supplies  - Continue Ritalin for hypersomnia   - Download reviewed and discussed with patient  - He  was advised to continue current positive airway pressure therapy with above described pressure. - He  advised to keep good compliance with current recommended pressure to get optimal results and clinical improvement  - Recommend 7-9 hours of sleep with PAP  - He was advised to call [facility] regarding supplies if needed.   -He call my office for earlier appointment if needed for worsening of sleep symptoms.   - He was instructed on weight loss  - Radha Cifuentes was educated about my impression and plan. Patient verbalizesunderstanding.   We will see Sid Key back in: 1 year with download    Information added by my medical assistant/LPN was reviewed today         Sridhar Ashraf PA-C, MPAS  12/21/2020       Total time for visit was 20 min

## 2020-12-22 ENCOUNTER — OFFICE VISIT (OUTPATIENT)
Dept: FAMILY MEDICINE CLINIC | Age: 70
End: 2020-12-22

## 2020-12-22 VITALS
SYSTOLIC BLOOD PRESSURE: 126 MMHG | RESPIRATION RATE: 12 BRPM | HEIGHT: 67 IN | DIASTOLIC BLOOD PRESSURE: 76 MMHG | BODY MASS INDEX: 24.19 KG/M2 | TEMPERATURE: 97.1 F | HEART RATE: 70 BPM | WEIGHT: 154.13 LBS

## 2020-12-22 LAB
HEMOCCULT STL QL: NEGATIVE
HEMOCCULT STL QL: NORMAL
HEMOCCULT STL QL: NORMAL

## 2020-12-22 PROCEDURE — 90471 IMMUNIZATION ADMIN: CPT | Performed by: FAMILY MEDICINE

## 2020-12-22 PROCEDURE — 90688 IIV4 VACCINE SPLT 0.5 ML IM: CPT | Performed by: FAMILY MEDICINE

## 2020-12-22 PROCEDURE — 99397 PER PM REEVAL EST PAT 65+ YR: CPT | Performed by: FAMILY MEDICINE

## 2020-12-22 PROCEDURE — 82270 OCCULT BLOOD FECES: CPT | Performed by: FAMILY MEDICINE

## 2020-12-22 ASSESSMENT — ENCOUNTER SYMPTOMS
NAUSEA: 0
EYE PAIN: 0
SHORTNESS OF BREATH: 0
CHEST TIGHTNESS: 0
ABDOMINAL PAIN: 0
SORE THROAT: 0
BLOOD IN STOOL: 0
TROUBLE SWALLOWING: 0
COUGH: 0
BACK PAIN: 0
CONSTIPATION: 0

## 2020-12-22 NOTE — PROGRESS NOTES
CARE VISIT    Moe Bush  YOB: 1950    Date of Service:  12/22/2020    Chief Complaint:   Moe Bush is a 72 y.o. male who presents for Comprehensive Annual Evaluation    Patient Active Problem List    Diagnosis Date Noted    Complex sleep apnea syndrome 03/10/2016     Priority: High    Narcolepsy 10/16/2014     Priority: Medium    ASHD (arteriosclerotic heart disease)      Priority: Medium    Hearing loss 07/25/2013     Priority: Low    Arthritis 07/25/2013     Priority: Low    Hypercholesteremia 07/01/1997     Priority: Low    Central sleep apnea due to Cheyne-Santos respiration 05/11/2017    Rotator cuff (capsule) sprain     Colon polyps        ashd  Stable    stent  2017      Lipid   Stable     Colonoscopy  2022  Dr Olinda David and    See  Sleep lab  [facility]  Preventive Care:  Last eye exam:   2020  Floaters   Exercise: walking 45 minutes/day- 5 days/week  Fracture within the past 6 months: no      Living will: Has  A will  Power  Of  Health  And  Will     Review of Systems   Constitutional: Negative for fatigue and fever. HENT: Negative for congestion, ear pain, postnasal drip, sore throat and trouble swallowing. Eyes: Negative for pain. Respiratory: Negative for cough, chest tightness and shortness of breath. Cardiovascular: Negative for chest pain, palpitations and leg swelling. Gastrointestinal: Negative for abdominal pain, blood in stool, constipation and nausea. Genitourinary: Negative for difficulty urinating, frequency and urgency. Musculoskeletal: Negative for arthralgias, back pain, joint swelling and neck stiffness. Skin: Negative for rash. Neurological: Negative for dizziness, weakness and headaches. Hematological: Negative for adenopathy. Does not bruise/bleed easily. Psychiatric/Behavioral: Negative for behavioral problems, dysphoric mood and sleep disturbance.        Allergies   Allergen Reactions  Polysporin [Bacitracin-Polymyxin B] Hives     Prior to Visit Medications    Medication Sig Taking? Authorizing Provider   methylphenidate (RITALIN) 20 MG tablet Take 1 tablet by mouth 3 times daily for 30 days. Yes Fernanda Wylie PA-C   atorvastatin (LIPITOR) 40 MG tablet TAKE 1 TABLET BY MOUTH ONE TIME A DAY Yes Reina Cid MD   Multiple Vitamins-Minerals (ICAPS AREDS 2 PO) Take 1 capsule by mouth 2 times daily Yes Historical Provider, MD   fluocinonide (LIDEX) 0.05 % gel Apply topically 2 times daily. Yes Reina Cid MD   ipratropium (ATROVENT) 0.06 % nasal spray 2 sprays by Nasal route daily as needed  Yes Historical Provider, MD   cetirizine (ZYRTEC) 10 MG tablet Take 10 mg by mouth daily  Yes Historical Provider, MD   B Complex Vitamins (VITAMIN-B COMPLEX PO) Take 1 tablet by mouth daily. Yes Historical Provider, MD   Calcium Carbonate-Vitamin D (CALCIUM 500/VITAMIN D PO) Take 1 tablet by mouth daily. Yes Historical Provider, MD   aspirin 81 MG chewable tablet Take 81 mg by mouth daily.    Yes Historical Provider, MD       Past Medical History:   Diagnosis Date    Actinic keratoses     Angina effort      Replacing deprecated diagnoses    Arthritis     hands    ASHD (arteriosclerotic heart disease)     Carotid disease, bilateral ([address] 2017    Colon polyps 5/07    Hypercholesteremia 7/97    Narcolepsy     Rotator cuff (capsule) sprain     Tobacco abuse     Unspecified sleep apnea      Past Surgical History:   Procedure Laterality Date    APPENDECTOMY      CATARACT REMOVAL Bilateral 2016    Right- Oct, Left- Dec- [facility]     COLONOSCOPY  2007,         CORONARY ANGIOPLASTY WITH STENT PLACEMENT  7/05    CORONARY ANGIOPLASTY WITH STENT PLACEMENT  11/2017    2 nd stent to LAD    HEMORRHOID SURGERY  09/2016    Dr Ammy Jiménez, every 2 weeks x 3 times    [facility] Left 2015    Dr Yonatan Huerta @ [facility]   rt 2015    TONSILLECTOMY Family History   Problem Relation Age of Onset    Stroke Mother     Diabetes Mother     Heart Disease Father     Asthma Father      Social History     Socioeconomic History    Marital status:      Spouse name: Not on file    Number of children: Not on file    Years of education: Not on file    Highest education level: Not on file   Occupational History    Not on file   Social Needs    Financial resource strain: Not on file    Food insecurity     Worry: Not on file     Inability: Not on file    Transportation needs     Medical: Not on file     Non-medical: Not on file   Tobacco Use    Smoking status: Former Smoker     Packs/day: 1.00     Years: 45.00     Pack years: 45.00     Types: Cigarettes     Quit date: 7/2/2005     Years since quitting: 15.4    Smokeless tobacco: Never Used   Substance and Sexual Activity    Alcohol use: Yes     Comment: social    Drug use: No    Sexual activity: Yes     Partners: Female   Lifestyle    Physical activity     Days per week: Not on file     Minutes per session: Not on file    Stress: Not on file   Relationships    Social connections     Talks on phone: Not on file     Gets together: Not on file     Attends Jew service: Not on file     Active member of club or organization: Not on file     Attends meetings of clubs or organizations: Not on file     Relationship status: Not on file    Intimate partner violence     Fear of current or ex partner: Not on file     Emotionally abused: Not on file     Physically abused: Not on file     Forced sexual activity: Not on file   Other Topics Concern    Not on file   Social History Narrative    Not on file       Wt Readings from Last 3 Encounters:   12/22/20 154 lb 2 oz (69.9 kg)   12/21/20 156 lb (70.8 kg)   07/16/20 150 lb (68 kg)     BP Readings from Last 3 Encounters:   12/22/20 126/76   12/21/20 122/70   07/16/20 108/62        Vitals:    12/22/20 1215   BP: 126/76   Site: Right Upper Arm Position: Sitting   Cuff Size: Medium Adult   Pulse: 70   Resp: 12   Temp: 97.1 °F (36.2 °C)   TempSrc: Oral   Weight: 154 lb 2 oz (69.9 kg)   Height: 5' 7\" (1.702 m)     Body mass index is 24.14 kg/m². Physical Exam  Vitals signs and nursing note reviewed. Constitutional:       Appearance: He is well-developed. HENT:      Head: Normocephalic and atraumatic. Right Ear: External ear normal.      Left Ear: External ear normal.      Nose: Nose normal.   Eyes:      Conjunctiva/sclera: Conjunctivae normal.      Pupils: Pupils are equal, round, and reactive to light. Comments: Fundi nl   Neck:      Musculoskeletal: Normal range of motion and neck supple. Thyroid: No thyromegaly. Cardiovascular:      Rate and Rhythm: Normal rate and regular rhythm. Pulses:           Carotid pulses are 0 on the right side. Radial pulses are 0 on the right side. Dorsalis pedis pulses are 2+ on the right side and 2+ on the left side. Posterior tibial pulses are 2+ on the right side. Heart sounds: Normal heart sounds. Pulmonary:      Effort: Pulmonary effort is normal.      Breath sounds: Normal breath sounds. No wheezing or rales. Abdominal:      General: Bowel sounds are normal.      Palpations: Abdomen is soft. There is no mass. Tenderness: There is no abdominal tenderness. Hernia: There is no hernia in the left inguinal area or right inguinal area. Genitourinary:     Penis: Normal and uncircumcised. Testes: Normal.         Right: Mass, tenderness or swelling not present. Left: Mass, tenderness or swelling not present. Epididymis:      Right: Normal.      Left: Normal.      Prostate: Enlarged (2+). Not tender and no nodules present. Rectum: Normal. Guaiac result negative. No mass, tenderness, anal fissure, external hemorrhoid or internal hemorrhoid. Normal anal tone. Musculoskeletal: Normal range of motion.    Lymphadenopathy: Cervical: No cervical adenopathy. Skin:     General: Skin is warm and dry. Findings: No rash. Neurological:      Mental Status: He is alert and oriented to person, place, and time. Cranial Nerves: No cranial nerve deficit. Deep Tendon Reflexes: Reflexes are normal and symmetric. Lipid panel:   Lab Results   Component Value Date    CHOL 151 11/16/2020    TRIG 62 11/16/2020    HDL 61 11/16/2020    LDLCALC 78 11/16/2020     Glucose:   Glucose (mg/dL)   Date Value   11/16/2020 107 (H)       Patient Care Team:  Kishore Acevedo MD as PCP - General  Kishore Acevedo MD as PCP - [facility]  Gabe Neves MD as Consulting Physician (Pulmonary Disease)  Debi Irving MD as Consulting Physician (Gastroenterology)  Bertha Whitt MD as Consulting Physician (Cardiology)    Immunization History   Administered Date(s) Administered    Influenza 10/30/2012    Influenza Virus Vaccine 11/08/2013, 11/26/2014    Influenza, Quadv, IM, (6 mo and older Fluzone, Flulaval, Fluarix and 3 yrs and older Afluria) 10/13/2016, 09/19/2017, 11/01/2018, 10/21/2019, 12/22/2020    Pneumococcal Conjugate 13-valent ([id number]) 10/13/2016    Pneumococcal Polysaccharide ([id number]) 11/10/2017    Td, unspecified formulation 09/01/1997    Varicella Zoster Immune Globulin 11/06/2012    Zoster Recombinant (Shingrix) 12/06/2019, 03/10/2020       Health Maintenance Due   Topic Date Due    DTaP/Tdap/Td vaccine (1 - Tdap) 08/20/1970    Annual Wellness Visit (AWV)  05/29/2019        Assessment/Plan:     Diagnosis Orders   1. Well adult exam     2. Need for influenza vaccination  INFLUENZA, QUADV, 0.5ML, 6 MO AND OLDER, IM, MDV, (Sigifredo Carrera)   3. ASHD (arteriosclerotic heart disease)     4. Complex sleep apnea syndrome     5. Primary narcolepsy without cataplexy     6. Hypercholesteremia     7.  Polyp of colon, unspecified part of colon, unspecified type           PLAn Current Outpatient Medications   Medication Sig Dispense Refill    methylphenidate (RITALIN) 20 MG tablet Take 1 tablet by mouth 3 times daily for 30 days. 270 tablet 0    atorvastatin (LIPITOR) 40 MG tablet TAKE 1 TABLET BY MOUTH ONE TIME A DAY 90 tablet 1    Multiple Vitamins-Minerals (ICAPS AREDS 2 PO) Take 1 capsule by mouth 2 times daily      fluocinonide (LIDEX) 0.05 % gel Apply topically 2 times daily. 15 g 1    ipratropium (ATROVENT) 0.06 % nasal spray 2 sprays by Nasal route daily as needed       cetirizine (ZYRTEC) 10 MG tablet Take 10 mg by mouth daily       B Complex Vitamins (VITAMIN-B COMPLEX PO) Take 1 tablet by mouth daily.  Calcium Carbonate-Vitamin D (CALCIUM 500/VITAMIN D PO) Take 1 tablet by mouth daily.  aspirin 81 MG chewable tablet Take 81 mg by mouth daily. No current facility-administered medications for this visit. No follow-ups on file.    Orders Placed This Encounter   Procedures    INFLUENZA, QUADV, 0.5ML, 6 MO AND OLDER, IM, MDV, (Yohana Bone)    POCT occult blood stool           Results for orders placed or performed in visit on 12/22/20   POCT occult blood stool   Result Value Ref Range    OCCULT BLOOD FECAL negative     OCCULT BLOOD FECAL      OCCULT BLOOD FECAL           See in  6  mths

## 2020-12-22 NOTE — PROGRESS NOTES
Immunizations Administered     Name Date Dose Route    Influenza, Quadv, IM, (6 mo and older Fluzone, Flulaval, Fluarix and 3 yrs and older Afluria) 12/22/2020 0.5 mL Intramuscular    Site: Deltoid- Left    Lot: [id number]    NDC: 80623-340-52

## 2021-03-15 ENCOUNTER — OFFICE VISIT (OUTPATIENT)
Dept: FAMILY MEDICINE CLINIC | Age: 71
End: 2021-03-15

## 2021-03-15 VITALS
HEART RATE: 68 BPM | RESPIRATION RATE: 16 BRPM | HEIGHT: 66 IN | TEMPERATURE: 97.7 F | WEIGHT: 150.38 LBS | BODY MASS INDEX: 24.17 KG/M2 | DIASTOLIC BLOOD PRESSURE: 92 MMHG | SYSTOLIC BLOOD PRESSURE: 138 MMHG

## 2021-03-15 DIAGNOSIS — R42 VERTIGO: Primary | ICD-10-CM

## 2021-03-15 DIAGNOSIS — L43.9 LICHEN PLANUS: ICD-10-CM

## 2021-03-15 PROCEDURE — 99213 OFFICE O/P EST LOW 20 MIN: CPT | Performed by: FAMILY MEDICINE

## 2021-03-15 RX ORDER — MECLIZINE HYDROCHLORIDE 25 MG/1
TABLET ORAL
Qty: 40 TABLET | Refills: 1 | Status: SHIPPED | OUTPATIENT
Start: 2021-03-15 | End: 2021-06-28

## 2021-03-15 RX ORDER — FLUOCINONIDE GEL 0.5 MG/G
GEL TOPICAL
Qty: 15 G | Refills: 1 | Status: SHIPPED | OUTPATIENT
Start: 2021-03-15 | End: 2022-08-11 | Stop reason: SDUPTHER

## 2021-03-15 ASSESSMENT — ENCOUNTER SYMPTOMS
BLOOD IN STOOL: 0
TROUBLE SWALLOWING: 0
CHEST TIGHTNESS: 0
SHORTNESS OF BREATH: 0
ABDOMINAL PAIN: 0
EYE PAIN: 0
BACK PAIN: 0
COUGH: 0
NAUSEA: 0
SORE THROAT: 0
CONSTIPATION: 0

## 2021-03-15 ASSESSMENT — PATIENT HEALTH QUESTIONNAIRE - PHQ9
SUM OF ALL RESPONSES TO PHQ QUESTIONS 1-9: 0
1. LITTLE INTEREST OR PLEASURE IN DOING THINGS: 0
SUM OF ALL RESPONSES TO PHQ QUESTIONS 1-9: 0

## 2021-03-15 NOTE — PROGRESS NOTES
Subjective:      Patient ID: Connor Clement is a 72 y.o. male. Hearing  Aides and the  Same     Dizziness  This is a new problem. The current episode started in the past 7 days (last  5 days ). Associated symptoms include vertigo. Pertinent negatives include no abdominal pain, arthralgias, chest pain, congestion, coughing, fatigue, fever, headaches, joint swelling, nausea, rash, sore throat or weakness. Associated symptoms comments:    Falls  To  Right       Hearing  Is ok . The symptoms are aggravated by bending and twisting. He has tried nothing for the symptoms. Past Medical History:   Diagnosis Date    Actinic keratoses     Angina effort      Replacing deprecated diagnoses    Arthritis     hands    ASHD (arteriosclerotic heart disease)     Carot[address] disease, [address] [address] 2017    Colon polyps 5/07    Hypercholesteremia 7/97    Narcolepsy     Rotator cuff (capsule) sprain     Tobacco abuse     Unspecified sleep apnea        Review of Systems   Constitutional: Negative for fatigue and fever. HENT: Negative for congestion, ear pain, postnasal drip, sore throat and trouble swallowing. Eyes: Negative for pain. Respiratory: Negative for cough, chest tightness and shortness of breath. Cardiovascular: Negative for chest pain, palpitations and leg swelling. Gastrointestinal: Negative for abdominal pain, blood in stool, constipation and nausea. Genitourinary: Negative for difficulty urinating, frequency and urgency. Musculoskeletal: Negative for arthralgias, back pain, joint swelling and neck stiffness. Skin: Negative for rash. Neurological: Positive for dizziness and vertigo. Negative for weakness and headaches. Hematological: Negative for adenopathy. Does not bruise/bleed easily. Psychiatric/Behavioral: Negative for behavioral problems, dysphoric mood and sleep disturbance.      BP (!) 138/92 (Site: Right Upper Arm, Position: Sitting, Cuff Size: Medium Adult)   Pulse 68 Temp 97.7 °F (36.5 °C) (Skin)   Resp 16   Ht 5' 6\" (1.676 m)   Wt 150 lb 6 oz (68.2 kg)   BMI 24.27 kg/m²   Objective:   Physical Exam  Vitals signs and nursing note reviewed. Constitutional:       Appearance: He is well-developed. HENT:      Head: Normocephalic and atraumatic. Right Ear: Tympanic membrane and external ear normal.      Left Ear: Tympanic membrane and external ear normal. There is impacted cerumen. Ears:      Comments:   Removed  Wax  Of  Left  Ear  Canal      Nose: Nose normal.   Eyes:      Conjunctiva/sclera: Conjunctivae normal.      Pupils: Pupils are equal, round, and reactive to light. Comments: Fundi nl   Neck:      Musculoskeletal: Normal range of motion and neck supple. Thyroid: No thyromegaly. Cardiovascular:      Rate and Rhythm: Normal rate and regular rhythm. Heart sounds: Normal heart sounds. Pulmonary:      Effort: Pulmonary effort is normal.      Breath sounds: Normal breath sounds. No wheezing or rales. Abdominal:      General: Bowel sounds are normal.      Palpations: Abdomen is soft. There is no mass. Tenderness: There is no abdominal tenderness. Musculoskeletal: Normal range of motion. Lymphadenopathy:      Cervical: No cervical adenopathy. Skin:     General: Skin is warm and dry. Findings: No rash. Neurological:      Mental Status: He is alert and oriented to person, place, and time. Cranial Nerves: No cranial nerve deficit. Deep Tendon Reflexes: Reflexes are normal and symmetric. Assessment:       Diagnosis Orders   1. Vertigo     2. Lichen planus  fluocinonide (LIDEX) 0.05 % gel         Plan:      Current Outpatient Medications   Medication Sig Dispense Refill    fluocinonide (LIDEX) 0.05 % gel Apply topically 2 times daily.  15 g 1    meclizine (ANTIVERT) 25 MG tablet One tab po  Qid  Or  Every  6  Hours  Prn dizziness 40 tablet 1    atorvastatin (LIPITOR) 40 MG tablet TAKE 1 TABLET BY MOUTH ONE TIME A DAY 90 tablet 1    Multiple Vitamins-Minerals (ICAPS AREDS 2 PO) Take 1 capsule by mouth 2 times daily      ipratropium (ATROVENT) 0.06 % nasal spray 2 sprays by Nasal route daily as needed       cetirizine (ZYRTEC) 10 MG tablet Take 10 mg by mouth daily       B Complex Vitamins (VITAMIN-B COMPLEX PO) Take 1 tablet by mouth daily.  Calcium Carbonate-Vitamin D (CALCIUM 500/VITAMIN D PO) Take 1 tablet by mouth daily.  aspirin 81 MG chewable tablet Take 81 mg by mouth daily. No current facility-administered medications for this visit. See in  une     No results found for this visit on 03/15/21.      Ej Richardson MD

## 2021-03-15 NOTE — PROGRESS NOTES
Subjective:      Patient ID: Jenni Ware is a 72 y.o. male.     HPI    Review of Systems    Objective:   Physical Exam    Assessment:      ***      Plan:      ***        Ching Trejo MD

## 2021-03-31 ENCOUNTER — OFFICE VISIT (OUTPATIENT)
Dept: FAMILY MEDICINE CLINIC | Age: 71
End: 2021-03-31

## 2021-03-31 VITALS
SYSTOLIC BLOOD PRESSURE: 102 MMHG | WEIGHT: 149.13 LBS | TEMPERATURE: 97.4 F | RESPIRATION RATE: 16 BRPM | BODY MASS INDEX: 23.97 KG/M2 | HEART RATE: 72 BPM | HEIGHT: 66 IN | DIASTOLIC BLOOD PRESSURE: 66 MMHG

## 2021-03-31 DIAGNOSIS — R42 VERTIGO, CONSTANT: Primary | ICD-10-CM

## 2021-03-31 DIAGNOSIS — I25.10 ASHD (ARTERIOSCLEROTIC HEART DISEASE): ICD-10-CM

## 2021-03-31 DIAGNOSIS — H90.3 SENSORINEURAL HEARING LOSS (SNHL) OF BOTH EARS: ICD-10-CM

## 2021-03-31 PROCEDURE — 99213 OFFICE O/P EST LOW 20 MIN: CPT | Performed by: FAMILY MEDICINE

## 2021-03-31 ASSESSMENT — ENCOUNTER SYMPTOMS
BACK PAIN: 0
NAUSEA: 0
CHEST TIGHTNESS: 0
SHORTNESS OF BREATH: 0
SORE THROAT: 0
TROUBLE SWALLOWING: 0
ABDOMINAL PAIN: 0
EYE PAIN: 0
COUGH: 0
CONSTIPATION: 0
BLOOD IN STOOL: 0

## 2021-04-02 ENCOUNTER — TELEPHONE (OUTPATIENT)
Dept: FAMILY MEDICINE CLINIC | Age: 71
End: 2021-04-02

## 2021-04-02 NOTE — TELEPHONE ENCOUNTER
MRI Brain @ [facility] on Monday 4-5-2021 at 11:30am. Patient to arrive at 11am to Main Radiology. No prep. [facility] (#: [phone]) who deals with the [employer] to complete that. It was approved. Auth #: [id number]  Effective: 4-2-2021 through 7-2-2021. Maria Del Carmen Light informed by Phone.

## 2021-04-05 ENCOUNTER — HOSPITAL ENCOUNTER (OUTPATIENT)
Dept: MRI IMAGING | Age: 71
Discharge: HOME | End: 2021-04-05
Payer: COMMERCIAL

## 2021-04-05 DIAGNOSIS — H90.3 SENSORINEURAL HEARING LOSS (SNHL) OF BOTH EARS: ICD-10-CM

## 2021-04-05 DIAGNOSIS — R42 VERTIGO, CONSTANT: ICD-10-CM

## 2021-04-05 DIAGNOSIS — I25.10 ASHD (ARTERIOSCLEROTIC HEART DISEASE): ICD-10-CM

## 2021-04-05 PROCEDURE — 70551 MRI BRAIN STEM W/O DYE: CPT

## 2021-04-06 ENCOUNTER — TELEPHONE (OUTPATIENT)
Dept: FAMILY MEDICINE CLINIC | Age: 71
End: 2021-04-06

## 2021-04-06 NOTE — TELEPHONE ENCOUNTER
----- Message from Amanda Traylor MD sent at 4/6/2021  1:10 AM EDT -----  Call as n tumor and small vessel disease with age and some chronic sinus

## 2021-04-30 ENCOUNTER — TELEPHONE (OUTPATIENT)
Dept: PULMONOLOGY | Age: 71
End: 2021-04-30

## 2021-04-30 DIAGNOSIS — G47.10 HYPERSOMNIA: ICD-10-CM

## 2021-04-30 DIAGNOSIS — G47.31 COMPLEX SLEEP APNEA SYNDROME: Primary | ICD-10-CM

## 2021-04-30 RX ORDER — METHYLPHENIDATE HYDROCHLORIDE 20 MG/1
20 TABLET ORAL 2 TIMES DAILY
COMMUNITY
End: 2021-04-30 | Stop reason: SDUPTHER

## 2021-04-30 RX ORDER — METHYLPHENIDATE HYDROCHLORIDE 20 MG/1
20 TABLET ORAL 3 TIMES DAILY
Qty: 270 TABLET | Refills: 0 | Status: SHIPPED | OUTPATIENT
Start: 2021-04-30 | End: 2021-07-29

## 2021-06-06 DIAGNOSIS — E78.00 HYPERCHOLESTEREMIA: ICD-10-CM

## 2021-06-08 RX ORDER — ATORVASTATIN CALCIUM 40 MG/1
TABLET, FILM COATED ORAL
Qty: 90 TABLET | Refills: 0 | Status: SHIPPED | OUTPATIENT
Start: 2021-06-08 | End: 2021-06-10

## 2021-06-10 DIAGNOSIS — E78.00 HYPERCHOLESTEREMIA: ICD-10-CM

## 2021-06-10 NOTE — TELEPHONE ENCOUNTER
Date of last visit:  3/31/2021  Date of next visit:  6/22/2021    Requested Prescriptions     Pending Prescriptions Disp Refills    atorvastatin (LIPITOR) 40 MG tablet [Pharmacy Med Name: Atorvastatin Calcium Oral Tablet 40 MG] 90 tablet 1     Sig: TAKE 1 TABLET BY MOUTH EVERY DAY

## 2021-06-11 RX ORDER — ATORVASTATIN CALCIUM 40 MG/1
TABLET, FILM COATED ORAL
Qty: 90 TABLET | Refills: 1 | Status: SHIPPED | OUTPATIENT
Start: 2021-06-11 | End: 2022-02-08

## 2021-06-28 ENCOUNTER — OFFICE VISIT (OUTPATIENT)
Dept: CARDIOLOGY CLINIC | Age: 71
End: 2021-06-28
Payer: COMMERCIAL

## 2021-06-28 VITALS
HEART RATE: 75 BPM | DIASTOLIC BLOOD PRESSURE: 91 MMHG | WEIGHT: 150.8 LBS | HEIGHT: 67 IN | BODY MASS INDEX: 23.67 KG/M2 | SYSTOLIC BLOOD PRESSURE: 143 MMHG

## 2021-06-28 DIAGNOSIS — I10 ESSENTIAL HYPERTENSION: ICD-10-CM

## 2021-06-28 DIAGNOSIS — I25.10 CORONARY ARTERY DISEASE INVOLVING NATIVE CORONARY ARTERY OF NATIVE HEART WITHOUT ANGINA PECTORIS: Primary | ICD-10-CM

## 2021-06-28 DIAGNOSIS — E78.01 FAMILIAL HYPERCHOLESTEROLEMIA: ICD-10-CM

## 2021-06-28 PROCEDURE — 93000 ELECTROCARDIOGRAM COMPLETE: CPT | Performed by: NUCLEAR MEDICINE

## 2021-06-28 PROCEDURE — 99213 OFFICE O/P EST LOW 20 MIN: CPT | Performed by: NUCLEAR MEDICINE

## 2021-06-28 RX ORDER — IBUPROFEN 200 MG
200 TABLET ORAL EVERY 6 HOURS PRN
COMMUNITY

## 2021-06-28 NOTE — PROGRESS NOTES
[address] [facility] [address]  [facility] .  [address] [place] [address]  Dept: [phone]  Dept Fax: [phone]  Loc: [phone]    Visit Date: 2021    Charbel Maxwell is a 72 y.o. male who presents todayfor:  Chief Complaint   Patient presents with    1 Year Follow Up    Hypertension    Coronary Artery Disease    Hyperlipidemia     Known LAD stent   Had vertigo at some point  No chest pain  some changes in breathing  Some more dyspnea than usual   No dizziness lately   No syncope  Bp is stable  No issues with the statins     HPI:  HPI  Past Medical History:   Diagnosis Date    Actinic keratoses     Angina effort      Replacing deprecated diagnoses    Arthritis     hands    ASHD (arteriosclerotic heart disease)     Carotid disease, bilateral ([address]     Colon polyps     Hypercholesteremia     Narcolepsy     Rotator cuff (capsule) sprain     Tobacco abuse     Unspecified sleep apnea       Past Surgical History:   Procedure Laterality Date    APPENDECTOMY      CATARACT REMOVAL Bilateral     [facility]- Oct[facility]     COLONOSCOPY  , -        CORONARY ANGIOPLASTY WITH STENT PLACEMENT      CORONARY ANGIOPLASTY WITH STENT PLACEMENT  2017    2 nd stent to LAD    HEMORRHOID SURGERY  2016    Dr Briana Rodrigeuz, every 2 weeks x 3 times     Marlys [place] Left     Dr Jeri Abbott @ [facility]   rt 2015    TONSILLECTOMY       Family History   Problem Relation Age of Onset    Stroke Mother     Diabetes Mother     Heart Disease Father     Asthma Father      Social History     Tobacco Use    Smoking status: Former Smoker     Packs/day: 1.00     Years: 45.00     Pack years: 45.00     Types: Cigarettes     Quit date: 2005     Years since quittin.0    Smokeless tobacco: Never Used   Substance Use Topics    Alcohol use: Yes     Comment: social      Current Outpatient Medications   Medication Sig Dispense Refill    ibuprofen (ADVIL;MOTRIN) 200 MG tablet Take 200 mg by mouth every 6 hours as needed for Pain      atorvastatin (LIPITOR) 40 MG tablet TAKE 1 TABLET BY MOUTH EVERY DAY  90 tablet 1    methylphenidate (RITALIN) 20 MG tablet Take 1 tablet by mouth 3 times daily for 90 days. 270 tablet 0    fluocinonide (LIDEX) 0.05 % gel Apply topically 2 times daily. 15 g 1    Multiple Vitamins-Minerals (ICAPS AREDS 2 PO) Take 1 capsule by mouth 2 times daily      ipratropium (ATROVENT) 0.06 % nasal spray 2 sprays by Nasal route daily as needed       cetirizine (ZYRTEC) 10 MG tablet Take 10 mg by mouth daily       B Complex Vitamins (VITAMIN-B COMPLEX PO) Take 1 tablet by mouth daily.  Calcium Carbonate-Vitamin D (CALCIUM 500/VITAMIN D PO) Take 1 tablet by mouth daily.  aspirin 81 MG chewable tablet Take 81 mg by mouth daily. No current facility-administered medications for this visit.      Allergies   Allergen Reactions    Polysporin [Bacitracin-Polymyxin B] Hives     Health Maintenance   Topic Date Due    DTaP/Tdap/Td vaccine (1 - Tdap) 09/02/1997    Annual Wellness Visit (AWV)  Never done    Lipid screen  11/16/2021    PSA counseling  11/16/2021    Colon cancer screen colonoscopy  12/05/2022    Flu vaccine  Completed    Shingles Vaccine  Completed    Pneumococcal 65+ years Vaccine  Completed    COVID-19 Vaccine  Completed    AAA screen  Completed    Hepatitis C screen  Completed    Hepatitis A vaccine  Aged Out    Hepatitis B vaccine  Aged Out    Hib vaccine  Aged Out    Meningococcal (ACWY) vaccine  Aged Out       Subjective:  Review of Systems  General:   No fever, no chills, No fatigue or weight loss  Pulmonary:    some more dyspnea, no wheezing  Cardiac:    Denies recent chest pain,   GI:     No nausea or vomiting, no abdominal pain  Neuro:    No dizziness or light headedness,   Musculoskeletal:  No recent active issues  Extremities:   No edema, no obvious claudication       Objective:  Physical Exam  BP (!) 143/91   Pulse 75   Ht 5' 7\" (1.702 m)   Wt 150 lb 12.8 oz (68.4 kg)   BMI 23.62 kg/m²   General:   Well developed, well nourished  Lungs:   Clear to auscultation  Heart:    Normal S1 S2, Slight murmur. no rubs, no gallops  Abdomen:   Soft, non tender, no organomegalies, positive bowel sounds  Extremities:   No edema, no cyanosis, good peripheral pulses  Neurological:   Awake, alert, oriented. No obvious focal deficits  Musculoskelatal:  No obvious deformities    Assessment:      Diagnosis Orders   1. Coronary artery disease involving native coronary artery of native heart without angina pectoris  EKG 12 lead    EKG 12 lead   2. Essential hypertension     3. Familial hypercholesterolemia     as above  Some dyspnea as above  ECG in office was done today. I reviewed the ECG. No acute findings      Plan:  No follow-ups on file. As above  Non invasive cardiac testing will be ordered to further evaluate for any ischemic or structural heart disease as a cause of the patient symptoms. We will proceed with a Stress Cardiolite test and echo soon. Continue risk factor modification and medical management  Thank you for allowing me to participate in the care of your patient. Please don't hesitate to contact me regarding any further issues related to the patient care    Orders Placed:  Orders Placed This Encounter   Procedures    EKG 12 lead     Standing Status:   Future     Number of Occurrences:   1     Standing Expiration Date:   6/28/2022     Order Specific Question:   Reason for Exam?     Answer: Other       Medications Prescribed:  No orders of the defined types were placed in this encounter. Discussed use, benefit, and side effects of prescribed medications. All patient questions answered. Pt voicedunderstanding. Instructed to continue current medications, diet and exercise. Continue risk factor modification and medical management. Patient agreed with treatment plan. Follow up as directed.     Electronically signedby Estela Greenwood MD on 6/28/2021 at 9:03 AM

## 2021-07-07 ENCOUNTER — TELEPHONE (OUTPATIENT)
Dept: FAMILY MEDICINE CLINIC | Age: 71
End: 2021-07-07

## 2021-07-07 DIAGNOSIS — H83.03 LABYRINTHITIS OF BOTH EARS: Primary | ICD-10-CM

## 2021-07-07 NOTE — TELEPHONE ENCOUNTER
Pt came in said that his vertigo is back. He has Meclizine and is wondering how long he would be on it before the vertigo would go away or ease up. Armin Singh that he can come in and see dr if he wants him to. Said that also  said that he could start therapy for the vertigo. Can that be ordered?

## 2021-07-09 NOTE — TELEPHONE ENCOUNTER
Clifton Carrera informed by Phone. Pt stated that after his MRI he did not have any more issues until just recently. He stated that he did not go to the therapy but since the vertigo has come back he would like to do the therapy now.

## 2021-07-09 NOTE — TELEPHONE ENCOUNTER
Can  Stay  On  The  antivert  2 to 3  Times a  Day      Did  He never see  Physical therapy?     mri of brain in  April   was  Normal       Where the  Physical therapy

## 2021-07-12 NOTE — TELEPHONE ENCOUNTER
Vestibular therapy at [facility] .   Order on printer     actually did for [place] as thought easier to there    Please call

## 2021-07-15 ENCOUNTER — HOSPITAL ENCOUNTER (OUTPATIENT)
Dept: NON INVASIVE DIAGNOSTICS | Age: 71
Discharge: HOME | End: 2021-07-15
Payer: COMMERCIAL

## 2021-07-15 DIAGNOSIS — I25.10 CORONARY ARTERY DISEASE INVOLVING NATIVE CORONARY ARTERY OF NATIVE HEART WITHOUT ANGINA PECTORIS: ICD-10-CM

## 2021-07-15 DIAGNOSIS — I10 ESSENTIAL HYPERTENSION: ICD-10-CM

## 2021-07-15 DIAGNOSIS — E78.01 FAMILIAL HYPERCHOLESTEROLEMIA: ICD-10-CM

## 2021-07-15 LAB
LV EF: 55 %
LV EF: 73 %
LVEF MODALITY: NORMAL
LVEF MODALITY: NORMAL

## 2021-07-15 PROCEDURE — A9500 TC99M SESTAMIBI: HCPCS | Performed by: NUCLEAR MEDICINE

## 2021-07-15 PROCEDURE — 93017 CV STRESS TEST TRACING ONLY: CPT | Performed by: NUCLEAR MEDICINE

## 2021-07-15 PROCEDURE — 93017 CV STRESS TEST TRACING ONLY: CPT

## 2021-07-15 PROCEDURE — 78452 HT MUSCLE IMAGE SPECT MULT: CPT

## 2021-07-15 PROCEDURE — 3430000000 HC RX DIAGNOSTIC RADIOPHARMACEUTICAL: Performed by: NUCLEAR MEDICINE

## 2021-07-15 PROCEDURE — 93306 TTE W/DOPPLER COMPLETE: CPT

## 2021-07-15 RX ADMIN — Medication 9.8 MILLICURIE: at 11:55

## 2021-07-15 RX ADMIN — Medication 34.1 MILLICURIE: at 13:35

## 2021-07-16 ENCOUNTER — HOSPITAL ENCOUNTER (OUTPATIENT)
Dept: PHYSICAL THERAPY | Age: 71
Setting detail: THERAPIES SERIES
Discharge: HOME | End: 2021-07-16
Payer: COMMERCIAL

## 2021-07-16 ENCOUNTER — TELEPHONE (OUTPATIENT)
Dept: CARDIOLOGY CLINIC | Age: 71
End: 2021-07-16

## 2021-07-16 DIAGNOSIS — I47.1 SVT (SUPRAVENTRICULAR TACHYCARDIA) (HCC): Primary | ICD-10-CM

## 2021-07-16 PROCEDURE — 97161 PT EVAL LOW COMPLEX 20 MIN: CPT

## 2021-07-16 NOTE — PROGRESS NOTES
** PLEASE SIGN, DATE AND TIME CERTIFICATION BELOW AND RETURN TO [facility] OUTPATIENT REHABILITATION (FAX #: [phone]). ATTEST/CO-SIGN IF ACCESSING VIA [employer]. THANK YOU.**    I certify that I have examined the patient below and determined that Physical Medicine and Rehabilitation service is necessary and that I approve the established plan of care for up to 90 days or as specifically noted. Attestation, signature or co-signature of physician indicates approval of certification requirements.    ________________________ ____________ __________  Physician Signature   Date   Time  [facility]  PHYSICAL THERAPY  [x] VESTIBULAR EVALUATION  [] DAILY NOTE [] PROGRESS NOTE [] DISCHARGE NOTE    [] [facility]   [x] [address]    [] [facility]   [] Alexia Opitz    Date: 2021  Patient Name:  Margy Garcia  : 1950  MRN: [id number]  CSN: [id number]    Referring Practitioner Gonzalez Rueda MD   Diagnosis Labyrinthitis, bilateral [H83.03]    Treatment Diagnosis BPPV   Date of Evaluation 21   Additional Pertinent History Vertigo, incontinence. B RCR. Functional Outcome Measure Used I   Functional Outcome Score 10/100 (21)       Insurance: Primary: Payor: Almarie Litten /  /  / ,   Secondary:    Authorization Information: PRE CERTIFICATION REQUIRED: YES AFTER THE 25TH VISIT WITH TBA. CALL [place] OF TREATMENT. EVAL NEEDS A PREDETERMINATION AND WAS COMPLETED. PREDETERMINATION # FOR EVAL IS [id number] GOOD FROM 21 TO 21,  ALL TREATMENTS MUST BE COMPLETED BY 10-15-21.   AQUATIC THERAPY COVERED: YES  MODALITIES COVERED:  YES  TELEHEALTH COVERED: NO   Visit # 1, 1/10 for progress note   Visits Allowed: unlimited   Recertification Date: 08   Physician Follow-Up: None scheduled   Physician Orders:    History of Present Illness: Back in December, he rolled to right in bed and would feel Exercise/Intervention   Notes                                                                                  Specific Interventions Next Treatment: reassess vestibular testing, treat as needed    Activity/Treatment Tolerance:  [x]  Patient tolerated treatment well  []  Patient limited by fatigue  []  Patient limited by pain   []  Patient limited by medical complications  []  Other:     Assessment: 72year old presents with BPPV, typically affecting right ear. Yolanda Hallpike and roll tests negative today but patient noted head rush with return to sitting EOB after left [employer]. No balance issues noted. Plan for patient to monitor symptoms over the next week and return if symptoms return. Body Structures/Functions/Activity Limitations:vestibular impairments  Prognosis: good    GOALS:  Patient Goal: No vertigo    Short Term Goals:  Time Frame: see LT      Long Term Goals:  Time Frame: 6 weeks  Patient will have negative bilateral [place]ham Hallpike test to stand out of chair without vertigo. Patient will have negative bilateral Roll test to tolerate rolling in bed and getting out of bed. Patient will reduce Dizziness Handicap Inventory score to <10/100 to tolerate bending over, quick head movements, and getting in/out of bed. Patient Education:   [x]  HEP/Education Completed: Plan of Care, Goals, attendance policy, self-Epley with handout   [employer] Access Code:  []  No new Education completed  []  Reviewed Prior HEP      [x]  Patient verbalized and/or demonstrated understanding of education provided. []  Patient unable to verbalize and/or demonstrate understanding of education provided. Will continue education. []  Barriers to learning:     PLAN:  Treatment Recommendations: Vestibular Rehabilitation    [x]  Plan of care initiated. Plan to see patient 1 times per week for 6 weeks to address the treatment planned outlined above.   []  Continue with current plan of care  []  Modify plan of care as

## 2021-07-26 ENCOUNTER — APPOINTMENT (OUTPATIENT)
Dept: PHYSICAL THERAPY | Age: 71
End: 2021-07-26
Payer: COMMERCIAL

## 2021-07-27 NOTE — DISCHARGE SUMMARY
[facility] NOTE  OUTPATIENT  [facility].    Patient Name: Shalini Cortes        CSN: [id number]   YOB: 1950  Gender: male  Nata Liu MD,    Labyrinthitis, bilateral [E20.52] ,      Patient is discharged from Physical Therapy services at this time. See last note for details related to results of therapy and goal achievement. Reason for discharge: Cancelled last scheduled appointment as instructed if vertigo was abolished. Pt discharged 7/27/21 at 1242.        Ace Britt DPT, #[id number]

## 2021-07-29 ENCOUNTER — HOSPITAL ENCOUNTER (OUTPATIENT)
Dept: NON INVASIVE DIAGNOSTICS | Age: 71
Discharge: HOME | End: 2021-07-29
Payer: COMMERCIAL

## 2021-07-29 DIAGNOSIS — I47.1 SVT (SUPRAVENTRICULAR TACHYCARDIA) (HCC): ICD-10-CM

## 2021-07-29 PROCEDURE — 93270 REMOTE 30 DAY ECG REV/REPORT: CPT

## 2021-08-05 ENCOUNTER — HOSPITAL ENCOUNTER (EMERGENCY)
Age: 71
Discharge: HOME | End: 2021-08-06
Attending: EMERGENCY MEDICINE
Payer: COMMERCIAL

## 2021-08-05 DIAGNOSIS — R33.8 ACUTE URINARY RETENTION: Primary | ICD-10-CM

## 2021-08-05 PROCEDURE — 99284 EMERGENCY DEPT VISIT MOD MDM: CPT

## 2021-08-05 ASSESSMENT — ENCOUNTER SYMPTOMS
COUGH: 0
ABDOMINAL PAIN: 0
BACK PAIN: 0
VOMITING: 0
SHORTNESS OF BREATH: 0

## 2021-08-05 ASSESSMENT — PAIN SCALES - GENERAL: PAINLEVEL_OUTOF10: 6

## 2021-08-06 VITALS
HEART RATE: 60 BPM | DIASTOLIC BLOOD PRESSURE: 71 MMHG | OXYGEN SATURATION: 100 % | HEIGHT: 67 IN | WEIGHT: 150 LBS | RESPIRATION RATE: 16 BRPM | BODY MASS INDEX: 23.54 KG/M2 | TEMPERATURE: 97.6 F | SYSTOLIC BLOOD PRESSURE: 116 MMHG

## 2021-08-06 LAB
ANION GAP SERPL CALCULATED.3IONS-SCNC: 13 MEQ/L (ref 8–16)
BACTERIA: ABNORMAL /HPF
BASOPHILS # BLD: 0.6 %
BASOPHILS ABSOLUTE: 0 THOU/MM3 (ref 0–0.1)
BILIRUBIN URINE: NEGATIVE
BLOOD, URINE: ABNORMAL
BUN BLDV-MCNC: 20 MG/DL (ref 7–22)
CALCIUM SERPL-MCNC: 8.5 MG/DL (ref 8.5–10.5)
CASTS 2: ABNORMAL /LPF
CASTS UA: ABNORMAL /LPF
CHARACTER, URINE: CLEAR
CHLORIDE BLD-SCNC: 102 MEQ/L (ref 98–111)
CO2: 19 MEQ/L (ref 23–33)
COLOR: YELLOW
CREAT SERPL-MCNC: 0.9 MG/DL (ref 0.4–1.2)
CRYSTALS, UA: ABNORMAL
EOSINOPHIL # BLD: 0.8 %
EOSINOPHILS ABSOLUTE: 0.1 THOU/MM3 (ref 0–0.4)
EPITHELIAL CELLS, UA: ABNORMAL /HPF
ERYTHROCYTE [DISTWIDTH] IN BLOOD BY AUTOMATED COUNT: 12.1 % (ref 11.5–14.5)
ERYTHROCYTE [DISTWIDTH] IN BLOOD BY AUTOMATED COUNT: 41.8 FL (ref 35–45)
GFR SERPL CREATININE-BSD FRML MDRD: 83 ML/MIN/1.73M2
GLUCOSE BLD-MCNC: 115 MG/DL (ref 70–108)
GLUCOSE URINE: NEGATIVE MG/DL
HCT VFR BLD CALC: 46.6 % (ref 42–52)
HEMOGLOBIN: 16 GM/DL (ref 14–18)
IMMATURE GRANS (ABS): 0.02 THOU/MM3 (ref 0–0.07)
IMMATURE GRANULOCYTES: 0.3 %
KETONES, URINE: NEGATIVE
LEUKOCYTE ESTERASE, URINE: NEGATIVE
LYMPHOCYTES # BLD: 10.1 %
LYMPHOCYTES ABSOLUTE: 0.7 THOU/MM3 (ref 1–4.8)
MCH RBC QN AUTO: 32.2 PG (ref 26–33)
MCHC RBC AUTO-ENTMCNC: 34.3 GM/DL (ref 32.2–35.5)
MCV RBC AUTO: 93.8 FL (ref 80–94)
MISCELLANEOUS 2: ABNORMAL
MONOCYTES # BLD: 5.9 %
MONOCYTES ABSOLUTE: 0.4 THOU/MM3 (ref 0.4–1.3)
NITRITE, URINE: NEGATIVE
NUCLEATED RED BLOOD CELLS: 0 /100 WBC
PH UA: 5 (ref 5–9)
PLATELET # BLD: 202 THOU/MM3 (ref 130–400)
PMV BLD AUTO: 9.4 FL (ref 9.4–12.4)
POTASSIUM REFLEX MAGNESIUM: 4 MEQ/L (ref 3.5–5.2)
PROTEIN UA: NEGATIVE
RBC # BLD: 4.97 MILL/MM3 (ref 4.7–6.1)
RBC URINE: ABNORMAL /HPF
RENAL EPITHELIAL, UA: ABNORMAL
SEG NEUTROPHILS: 82.3 %
SEGMENTED NEUTROPHILS ABSOLUTE COUNT: 5.8 THOU/MM3 (ref 1.8–7.7)
SODIUM BLD-SCNC: 134 MEQ/L (ref 135–145)
SPECIFIC GRAVITY, URINE: 1.01 (ref 1–1.03)
UROBILINOGEN, URINE: 0.2 EU/DL (ref 0–1)
WBC # BLD: 7.1 THOU/MM3 (ref 4.8–10.8)
WBC UA: ABNORMAL /HPF
YEAST: ABNORMAL

## 2021-08-06 PROCEDURE — 36415 COLL VENOUS BLD VENIPUNCTURE: CPT

## 2021-08-06 PROCEDURE — 85025 COMPLETE CBC W/AUTO DIFF WBC: CPT

## 2021-08-06 PROCEDURE — 81001 URINALYSIS AUTO W/SCOPE: CPT

## 2021-08-06 PROCEDURE — 80048 BASIC METABOLIC PNL TOTAL CA: CPT

## 2021-08-06 RX ORDER — CEPHALEXIN 500 MG/1
500 CAPSULE ORAL 2 TIMES DAILY
Qty: 14 CAPSULE | Refills: 0 | Status: SHIPPED | OUTPATIENT
Start: 2021-08-06 | End: 2021-08-13

## 2021-08-06 NOTE — ED NOTES
RN attempted to insert 16F martin. RN unsuccessful, martin coiling and unable to advance. RN to use coude catheter.      Inga Evans RN  08/06/21 6381

## 2021-08-06 NOTE — ED TRIAGE NOTES
Pt brought in by EMS for difficulty urinating. Pt reports he urinated last at 2100 today but he \"tried\" to go x 2 hours prior to that and is only urinating a small amount. Pt also c/o bloating and abdominal discomfort. Pt reports his last BM was this morning but it was small.
n/a

## 2021-08-06 NOTE — ED PROVIDER NOTES
[facility] [address] [address] EMERGENCY DEPT    EMERGENCY MEDICINE     Pt Name: Alejandra Lee  MRN: [id number]  [place] 1950  Date of evaluation: 8/5/2021  Provider: Desire Chan MD    [address] [place] [address]       Chief Complaint   Patient presents with    Urinary Retention       HISTORY OF PRESENT ILLNESS    Alejandra Lee is a pleasant 72 y.o. male who presents to the emergency department from home for suprapubic pain, distension and inability to urinate. He reports he feels his bladder is full, but he has not been able to urinate for the last 4 hours. He reports he has a known history of BPH, but has not had any urinary retention to this level. No cp/sob, no fever, no flank pain, no known blood in urine, no other complaints, no known exacerbating/relieving factors. Triage notes and Nursing notes were reviewed by myself. Any discrepancies are addressed above. PAST MEDICAL HISTORY     Past Medical History:   Diagnosis Date    Actinic keratoses     Angina effort      Replacing deprecated diagnoses    Arthritis     hands    ASHD (arteriosclerotic heart disease)     Carotid disease, bilateral ([address] 2017    Colon polyps 5/07    Hypercholesteremia 7/97    Narcolepsy     Rotator cuff (capsule) sprain     Tobacco abuse     Unspecified sleep apnea        SURGICAL HISTORY       Past Surgical History:   Procedure Laterality Date    APPENDECTOMY      CATARACT REMOVAL Bilateral 2016    Right- Oct, [facility]- [facility]- [facility]     COLONOSCOPY  2007,         CORONARY ANGIOPLASTY WITH STENT PLACEMENT  7/05    CORONARY ANGIOPLASTY WITH STENT PLACEMENT  11/2017    2 nd stent to LAD    HEMORRHOID SURGERY  09/2016    Dr Abdoulaye Smith, every 2 weeks x 3 times    [facility] Left 2015    Dr Dwaine Omalley @ [facility]   rt 2015    TONSILLECTOMY         CURRENT MEDICATIONS       Previous Medications    ASPIRIN 81 MG CHEWABLE TABLET    Take 81 mg by mouth daily.       ATORVASTATIN (LIPITOR) 40 MG TABLET    TAKE 1 TABLET BY MOUTH EVERY DAY     B COMPLEX VITAMINS (VITAMIN-B COMPLEX PO)    Take 1 tablet by mouth daily. CALCIUM CARBONATE-VITAMIN D (CALCIUM 500/VITAMIN D PO)    Take 1 tablet by mouth daily. CETIRIZINE (ZYRTEC) 10 MG TABLET    Take 10 mg by mouth daily     FLUOCINONIDE (LIDEX) 0.05 % GEL    Apply topically 2 times daily. IBUPROFEN (ADVIL;MOTRIN) 200 MG TABLET    Take 200 mg by mouth every 6 hours as needed for Pain    IPRATROPIUM (ATROVENT) 0.06 % NASAL SPRAY    2 sprays by Nasal route daily as needed     MULTIPLE VITAMINS-MINERALS (ICAPS AREDS 2 PO)    Take 1 capsule by mouth 2 times daily       ALLERGIES     Polysporin [bacitracin-polymyxin b]    FAMILY HISTORY       Family History   Problem Relation Age of Onset    Stroke Mother     Diabetes Mother     Heart Disease Father     Asthma Father         SOCIAL HISTORY       Social History     Socioeconomic History    Marital status:      Spouse name: None    Number of children: None    Years of education: None    Highest education level: None   Occupational History    None   Tobacco Use    Smoking status: Former Smoker     Packs/day: 1.00     Years: 45.00     Pack years: 45.00     Types: Cigarettes     Quit date: 2005     Years since quittin.1    Smokeless tobacco: Never Used   Substance and Sexual Activity    Alcohol use: Yes     Alcohol/week: 8.0 standard drinks     Types: 8 Cans of beer per week     Comment: social    Drug use: No    Sexual activity: Yes     Partners: Female   Other Topics Concern    None   Social History Narrative    None     Social Determinants of Health     Financial Resource Strain:     Difficulty of Paying Living Expenses:    Food Insecurity:     Worried About Running Out of Food in the Last Year:     Ran Out of Food in the Last Year:    Transportation Needs:     Lack of Transportation (Medical):      Lack of Transportation (Non-Medical):    Physical Activity:     Days of or rales. Abdominal:      Palpations: Abdomen is soft. Tenderness: There is no abdominal tenderness. Comments: Tenderness and mild distension to suprapubic area   Musculoskeletal:      Cervical back: Neck supple. Skin:     General: Skin is warm. Findings: No rash. Neurological:      General: No focal deficit present. Mental Status: He is alert. Sensory: No sensory deficit. Motor: No weakness. Psychiatric:         Behavior: Behavior is cooperative. DIAGNOSTIC RESULTS     EKG:(none if blank)  All E[facility]'s are [facility] who either signs or Co-signs this chart in the absence of a cardiologist.        RADIOLOGY: (none if blank)   Interpretation per the Radiologistbelow, if available at the time of this note:    No orders to display       LABS:  Labs Reviewed   CBC WITH AUTO DIFFERENTIAL - Abnormal; Notable for the following components:       Result Value    Lymphocytes Absolute 0.7 (*)     All other components within normal limits   BASIC METABOLIC PANEL W/ REFLEX TO MG FOR LOW K - Abnormal; Notable for the following components:    Sodium 134 (*)     CO2 19 (*)     Glucose 115 (*)     All other components within normal limits   URINE WITH REFLEXED MICRO - Abnormal; Notable for the following components:    Blood, Urine MODERATE (*)     All other components within normal limits   GLOMERULAR FILTRATION RATE, ESTIMATED - Abnormal; Notable for the following components:    Est, Glom Filt Rate 83 (*)     All other components within normal limits   ANION GAP       All other labs were within normal range or not returned as of this dictation. Please note, any cultures that may have been sent were not resulted at the time of this patient visit.     EMERGENCY DEPARTMENT COURSE andMedical Decision Making:     MDM/   Pt presents to the ER c/o acute urinary retention, martin catheter placed here with immediate drainage of around 600cc clear yellow urine and immediate relief of symptoms. Pt has known h/o BPH but never required martin before. Will give leg bag, also add abx for possible uti although UA not clearly diagnostic. Pt also counseled regarding importance of close outpatient f/u and ER return precautions. Strict returnprecautions and follow up instructions were discussed with the patient with which the patient agrees        ED Medications administered this visit:  Medications - No data to display      Procedures: (None if blank)       CLINICAL       1. Acute urinary retention          DISPOSITION/PLAN   DISPOSITION Decision To Discharge 08/06/2021 01:21:49 AM      PATIENT REFERRED TO:  [facility] Urology  [address] [place] [address]  In 3 days        DISCHARGE MEDICATIONS:  New Prescriptions    CEPHALEXIN (KEFLEX) 500 MG CAPSULE    Take 1 capsule by mouth 2 times daily for 7 days              (Please note that portions of this note were completed with a voice recognition program.  Efforts were made to edit the dictations but occasionallywords are mis-transcribed.)      Kathie Marin MD,FACEP (electronically signed)  Attending Physician, Emergency Department        Lorenza Garzon MD  08/06/21 9823

## 2021-08-10 ENCOUNTER — OFFICE VISIT (OUTPATIENT)
Dept: UROLOGY | Age: 71
End: 2021-08-10
Payer: COMMERCIAL

## 2021-08-10 VITALS
HEIGHT: 67 IN | SYSTOLIC BLOOD PRESSURE: 128 MMHG | WEIGHT: 152 LBS | BODY MASS INDEX: 23.86 KG/M2 | DIASTOLIC BLOOD PRESSURE: 80 MMHG

## 2021-08-10 DIAGNOSIS — N40.1 BENIGN LOCALIZED PROSTATIC HYPERPLASIA WITH LOWER URINARY TRACT SYMPTOMS (LUTS): Primary | ICD-10-CM

## 2021-08-10 PROCEDURE — 99204 OFFICE O/P NEW MOD 45 MIN: CPT | Performed by: UROLOGY

## 2021-08-10 RX ORDER — METHYLPHENIDATE HYDROCHLORIDE 20 MG/1
20 TABLET ORAL 2 TIMES DAILY
COMMUNITY
End: 2021-09-08 | Stop reason: SDUPTHER

## 2021-08-10 RX ORDER — MECLIZINE HYDROCHLORIDE 25 MG/1
25 TABLET ORAL 3 TIMES DAILY PRN
COMMUNITY
End: 2021-12-21

## 2021-08-10 RX ORDER — TAMSULOSIN HYDROCHLORIDE 0.4 MG/1
0.4 CAPSULE ORAL DAILY
Qty: 90 CAPSULE | Refills: 3 | Status: SHIPPED | OUTPATIENT
Start: 2021-08-10 | End: 2021-11-03 | Stop reason: SINTOL

## 2021-08-10 RX ORDER — DIAPER,BRIEF,INFANT-TODD,DISP
EACH MISCELLANEOUS 2 TIMES DAILY
COMMUNITY

## 2021-08-10 NOTE — PROGRESS NOTES
MILTON Schneider MD        [address] [facility] [address]  Dept: [phone]  Dept Fax: [phone]: [address] [address] Urology Office Note -     Patient:  Moe Bush  YOB: 1950  Date: 8/10/2021    The patient is a 72 y.o. male who presents today for evaluation of the following problems:   Chief Complaint   Patient presents with    Advice Only     New patient seen at [facility] ER for urinary retention.  Urinary Retention    referred/consultation requested by Maryjane Dyer MD.    HISTORY OF PRESENT ILLNESS:     BPH  Was in ED Thursday  Progressively worsening   flomax caused runny nose years ago but did not do anything more  auass 29 mostly dissatisfied  Last psa 3.53        Requested/reviewed records from Maryjane Dyer MD office and/or outside physician/EMR    (Patient's old records have been requested, reviewed and pertinent findings summarized in today's note.)    Procedures Today: N/A      Last several PSA's:  Lab Results   Component Value Date    PSA 2.92 (H) 03/02/2017    PSA 2.64 (H) 02/04/2016    PSA 2.24 02/19/2015       Last total testosterone:  No results found for: TESTOSTERONE    Urinalysis today:  No results found for this visit on 08/10/21. Last BUN and creatinine:  Lab Results   Component Value Date    BUN 20 08/06/2021     Lab Results   Component Value Date    CREATININE 0.9 08/06/2021         Imaging Reviewed during this Office Visit:   Andrzej Balbuena MD independently reviewed the images and verified the radiology reports from:    No results found.     PAST MEDICAL, FAMILY AND SOCIAL HISTORY:  Past Medical History:   Diagnosis Date    Actinic keratoses     Angina effort      Replacing deprecated diagnoses    Arthritis     hands    ASHD (arteriosclerotic heart disease)     Carotid disease, bilateral ([address] 2017    Colon polyps 5/07    Hypercholesteremia 7/97    Narcolepsy     Rotator cuff (capsule) sprain     Tobacco abuse     Unspecified sleep apnea      Past Surgical History:   Procedure Laterality Date    APPENDECTOMY      CATARACT REMOVAL Bilateral 2016    Right- Oct, Left- Dec- DR [facility]     COLONOSCOPY  2007,         CORONARY ANGIOPLASTY WITH STENT PLACEMENT  7/05    CORONARY ANGIOPLASTY WITH STENT PLACEMENT  11/2017    2 nd stent to LAD    HEMORRHOID SURGERY  09/2016    Dr Marly Loyd, every 2 weeks x 3 times    Darnelle Cele Left 2015    Dr Vanna Dotson @ [facility]   rt 2015    TONSILLECTOMY       Family History   Problem Relation Age of Onset    Stroke Mother     Diabetes Mother     Heart Disease Father     Asthma Father      Outpatient Medications Marked as Taking for the 8/10/21 encounter (Office Visit) with Gino Jimenez MD   Medication Sig Dispense Refill    methylphenidate (RITALIN) 20 MG tablet Take 20 mg by mouth 2 times daily. 2-3 times a day      meclizine (ANTIVERT) 25 MG tablet Take 25 mg by mouth 3 times daily as needed      hydrocortisone 1 % cream Apply topically 2 times daily Apply topically 2 times daily.  tamsulosin (FLOMAX) 0.4 MG capsule Take 1 capsule by mouth daily 90 capsule 3    cephALEXin (KEFLEX) 500 MG capsule Take 1 capsule by mouth 2 times daily for 7 days 14 capsule 0    ibuprofen (ADVIL;MOTRIN) 200 MG tablet Take 200 mg by mouth every 6 hours as needed for Pain      atorvastatin (LIPITOR) 40 MG tablet TAKE 1 TABLET BY MOUTH EVERY DAY  90 tablet 1    fluocinonide (LIDEX) 0.05 % gel Apply topically 2 times daily. 15 g 1    Multiple Vitamins-Minerals (ICAPS AREDS 2 PO) Take 1 capsule by mouth 2 times daily      ipratropium (ATROVENT) 0.06 % nasal spray 2 sprays by Nasal route daily as needed       cetirizine (ZYRTEC) 10 MG tablet Take 10 mg by mouth daily       B Complex Vitamins (VITAMIN-B COMPLEX PO) Take 1 tablet by mouth daily.       Calcium Carbonate-Vitamin D (CALCIUM 500/VITAMIN D PO) Take 1 tablet by mouth daily.  aspirin 81 MG chewable tablet Take 81 mg by mouth daily. Polysporin [bacitracin-polymyxin b]  Social History     Tobacco Use   Smoking Status Former Smoker    Packs/day: 1.00    Years: 45.00    Pack years: 45.00    Types: Cigarettes    Quit date: 2005    Years since quittin.1   Smokeless Tobacco Never Used      (If patient a smoker, smoking cessation counseling offered)   Social History     Substance and Sexual Activity   Alcohol Use Yes    Alcohol/week: 8.0 standard drinks    Types: 8 Cans of beer per week    Comment: social       REVIEW OF SYSTEMS:  Constitutional: negative  Eyes: negative  Respiratory: negative  Cardiovascular: negative  Gastrointestinal: negative  Genitourinary: see HPI  Musculoskeletal: negative  Skin: negative   Neurological: negative  Hematological/Lymphatic: negative  Psychological: negative        Physical Exam:    This a 72 y.o. male  Vitals:    08/10/21 0921   BP: 128/80     Body mass index is 23.81 kg/m². Constitutional: Patient in no acute distress;       Assessment and Plan        1. Benign localized prostatic hyperplasia with lower urinary tract symptoms (LUTS)               Plan:      Discussed bph etiology-- discussed starting flomax even though it didn't help years ago, cystoscopy, voiding trial, etc... Start flomax  Not comfortable with catheter but tolerable  Cystoscopy and voiding trial next Monday after starting flomax and giving it time for maximal effect        Prescriptions Ordered:  Orders Placed This Encounter   Medications    tamsulosin (FLOMAX) 0.4 MG capsule     Sig: Take 1 capsule by mouth daily     Dispense:  90 capsule     Refill:  3      Orders Placed:  No orders of the defined types were placed in this encounter.            Saundra Neves MD

## 2021-08-16 ENCOUNTER — PROCEDURE VISIT (OUTPATIENT)
Dept: UROLOGY | Age: 71
End: 2021-08-16
Payer: COMMERCIAL

## 2021-08-16 VITALS
BODY MASS INDEX: 24.48 KG/M2 | HEIGHT: 67 IN | WEIGHT: 156 LBS | DIASTOLIC BLOOD PRESSURE: 80 MMHG | SYSTOLIC BLOOD PRESSURE: 136 MMHG

## 2021-08-16 DIAGNOSIS — N40.1 BENIGN LOCALIZED PROSTATIC HYPERPLASIA WITH LOWER URINARY TRACT SYMPTOMS (LUTS): Primary | ICD-10-CM

## 2021-08-16 PROCEDURE — 52000 CYSTOURETHROSCOPY: CPT | Performed by: UROLOGY

## 2021-08-16 NOTE — PROGRESS NOTES
10 cc of water deflated from martin balloon. 16 Fr martin removed without difficulty prior to cysto.

## 2021-08-16 NOTE — PROGRESS NOTES
Cystoscopy Operative Note    Patient:  Moe Bush  MRN: [id number]  YOB: 1950    Date: 08/16/21  Surgeon: Andrzej Balbuena MD  Anesthesia: Urethral 2% Xylocaine   Indications: urinary retention  Position: Supine    Findings:   The patient was prepped and draped in the usual sterile fashion. The flexible cystoscope was advanced through the urethra and into the bladder. The bladder was thoroughly inspected and the following was noted:    Residual Urine: Moderate  Urethra: No abnormalities of the urethra are noted. Prostate: LLH++ no median lobe  Bladder: No tumors or CIS noted. No bladder diverticulum. mild trabeculation noted. Ureters: Clear efflux from both ureters. Orifices with normal configuration and location. The cystoscope was removed. The patient tolerated the procedure well. Cont flomax.  greenlight pamphlet given  Red rubber given in case he develops retention again  Follow up in one month for pvr and check

## 2021-08-24 ENCOUNTER — OFFICE VISIT (OUTPATIENT)
Dept: FAMILY MEDICINE CLINIC | Age: 71
End: 2021-08-24

## 2021-08-24 VITALS
TEMPERATURE: 97.4 F | HEIGHT: 67 IN | RESPIRATION RATE: 12 BRPM | SYSTOLIC BLOOD PRESSURE: 126 MMHG | HEART RATE: 64 BPM | BODY MASS INDEX: 23.9 KG/M2 | DIASTOLIC BLOOD PRESSURE: 68 MMHG | WEIGHT: 152.25 LBS

## 2021-08-24 DIAGNOSIS — N48.1 BALANITIS: Primary | ICD-10-CM

## 2021-08-24 PROCEDURE — 99213 OFFICE O/P EST LOW 20 MIN: CPT | Performed by: FAMILY MEDICINE

## 2021-08-24 RX ORDER — KETOCONAZOLE 20 MG/G
CREAM TOPICAL
Qty: 30 G | Refills: 1 | Status: SHIPPED | OUTPATIENT
Start: 2021-08-24

## 2021-08-24 SDOH — ECONOMIC STABILITY: FOOD INSECURITY: WITHIN THE PAST 12 MONTHS, THE FOOD YOU BOUGHT JUST DIDN'T LAST AND YOU DIDN'T HAVE MONEY TO GET MORE.: NEVER TRUE

## 2021-08-24 SDOH — ECONOMIC STABILITY: FOOD INSECURITY: WITHIN THE PAST 12 MONTHS, YOU WORRIED THAT YOUR FOOD WOULD RUN OUT BEFORE YOU GOT MONEY TO BUY MORE.: NEVER TRUE

## 2021-08-24 ASSESSMENT — ENCOUNTER SYMPTOMS
CHEST TIGHTNESS: 0
SHORTNESS OF BREATH: 0
BACK PAIN: 0
TROUBLE SWALLOWING: 0
NAUSEA: 0
CONSTIPATION: 0
SORE THROAT: 0
BLOOD IN STOOL: 0
ABDOMINAL PAIN: 0
EYE PAIN: 0
COUGH: 0

## 2021-08-24 ASSESSMENT — SOCIAL DETERMINANTS OF HEALTH (SDOH): HOW HARD IS IT FOR YOU TO PAY FOR THE VERY BASICS LIKE FOOD, HOUSING, MEDICAL CARE, AND HEATING?: NOT HARD AT ALL

## 2021-08-31 NOTE — PROCEDURES
[address] [place] [address]                                 EVENT MONITOR    PATIENT NAME: Thao Carpenter                   :        1950  MED REC NO:   [id number]                           ROOM:  ACCOUNT NO:   [de-identified]                           ADMIT DATE: 2021  PROVIDER:     Nathan Soto M.D. CLINICAL HISTORY AND INDICATION:  This is a patient with SVT. EVENT MONITOR DESCRIPTION:  Event monitor was attached to the patient  between 2021 and 2021. EVENT MONITOR FINDINGS:  Baseline rhythm showed sinus rhythm. Short  episodes of narrow complex tachycardia were detected at different times,  likely supraventricular tachycardia that were self-limiting. Cannot  exclude atrial fibrillation in one of the episodes. No V-tach and no  prolonged pauses. CONCLUSION:  1. Sinus rhythm. 2.  Intermittent episodes of supraventricular tachycardia. 3.  Cannot exclude one episode of possible atrial fibrillation. 4.  No pauses and no V-tach. 5.  Abnormal event monitor. Clinical correlation is recommended.         Mary Curry M.D.    D: 2021 15:50:10       T: 2021 10:36:59     [email or web address]  Job#: [id number]     Doc#: [id number]    CC:

## 2021-09-08 DIAGNOSIS — G47.10 HYPERSOMNIA: Primary | ICD-10-CM

## 2021-09-08 RX ORDER — METHYLPHENIDATE HYDROCHLORIDE 20 MG/1
20 TABLET ORAL 2 TIMES DAILY
Qty: 270 TABLET | Refills: 0 | Status: SHIPPED | OUTPATIENT
Start: 2021-09-08 | End: 2021-12-07

## 2021-09-08 NOTE — TELEPHONE ENCOUNTER
Margy Garcia called requesting a refill on the following medications:  Requested Prescriptions     Pending Prescriptions Disp Refills    methylphenidate (RITALIN) 20 MG tablet       Sig: Take 1 tablet by mouth 2 times daily. 2-3 times a day     Pharmacy verified:  [facility]      Date of last visit: 12/21/2021  Date of next visit (if applicable): 79/57/0802      Pt states medication is increased to 3 times per day

## 2021-09-14 ENCOUNTER — TELEPHONE (OUTPATIENT)
Dept: CARDIOLOGY CLINIC | Age: 71
End: 2021-09-14

## 2021-09-15 ENCOUNTER — OFFICE VISIT (OUTPATIENT)
Dept: FAMILY MEDICINE CLINIC | Age: 71
End: 2021-09-15

## 2021-09-15 VITALS
HEART RATE: 64 BPM | WEIGHT: 151.5 LBS | RESPIRATION RATE: 12 BRPM | HEIGHT: 67 IN | DIASTOLIC BLOOD PRESSURE: 66 MMHG | SYSTOLIC BLOOD PRESSURE: 122 MMHG | TEMPERATURE: 95.1 F | BODY MASS INDEX: 23.78 KG/M2

## 2021-09-15 DIAGNOSIS — M54.50 CHRONIC MIDLINE LOW BACK PAIN WITHOUT SCIATICA: ICD-10-CM

## 2021-09-15 DIAGNOSIS — I25.10 ASHD (ARTERIOSCLEROTIC HEART DISEASE): ICD-10-CM

## 2021-09-15 DIAGNOSIS — G89.29 CHRONIC MIDLINE LOW BACK PAIN WITHOUT SCIATICA: ICD-10-CM

## 2021-09-15 DIAGNOSIS — N40.1 BENIGN PROSTATIC HYPERPLASIA WITH URINARY FREQUENCY: Primary | ICD-10-CM

## 2021-09-15 DIAGNOSIS — R35.0 BENIGN PROSTATIC HYPERPLASIA WITH URINARY FREQUENCY: Primary | ICD-10-CM

## 2021-09-15 PROCEDURE — 99213 OFFICE O/P EST LOW 20 MIN: CPT | Performed by: FAMILY MEDICINE

## 2021-09-15 ASSESSMENT — ENCOUNTER SYMPTOMS
CONSTIPATION: 0
EYE PAIN: 0
SORE THROAT: 0
BLOOD IN STOOL: 0
BACK PAIN: 0
NAUSEA: 0
SHORTNESS OF BREATH: 0
TROUBLE SWALLOWING: 0
COUGH: 0
CHEST TIGHTNESS: 0
ABDOMINAL PAIN: 0

## 2021-09-15 NOTE — PROGRESS NOTES
Subjective:      Patient ID: Ekaterina Maldonado is a 80 y.o. male. bph    Notes and  With  Help   See  Urology      ashd  Stable      low  Back pain  Noted  ?  flomax     Past Medical History:   Diagnosis Date    Actinic keratoses     Angina effort      Replacing deprecated diagnoses    Arthritis     hands    ASHD (arteriosclerotic heart disease)     Carotid disease, bilateral ([facility] [address] 2017    Colon polyps 5/07    Hypercholesteremia 7/97    Narcolepsy     Rotator cuff (capsule) sprain     Tobacco abuse     Unspecified sleep apnea      Review of Systems   Constitutional: Negative for fatigue and fever. HENT: Negative for congestion, ear pain, postnasal drip, sore throat and trouble swallowing. Eyes: Negative for pain. Respiratory: Negative for cough, chest tightness and shortness of breath. Cardiovascular: Negative for chest pain, palpitations and leg swelling. Gastrointestinal: Negative for abdominal pain, blood in stool, constipation and nausea. Genitourinary: Negative for difficulty urinating, frequency and urgency. Musculoskeletal: Negative for arthralgias, back pain, joint swelling and neck stiffness. Skin: Negative for rash. Neurological: Negative for dizziness, weakness and headaches. Hematological: Negative for adenopathy. Does not bruise/bleed easily. Psychiatric/Behavioral: Negative for behavioral problems, dysphoric mood and sleep disturbance. /66 (Site: Right Upper Arm, Position: Sitting, Cuff Size: Medium Adult)   Pulse 64   Temp 95.1 °F (35.1 °C) (Infrared)   Resp 12   Ht 5' 7\" (1.702 m)   Wt 151 lb 8 oz (68.7 kg)   BMI 23.73 kg/m²   Objective:   Physical Exam  Vitals and nursing note reviewed. Constitutional:       Appearance: He is well-developed. HENT:      Head: Normocephalic and atraumatic.       Right Ear: External ear normal.      Left Ear: External ear normal.      Nose: Nose normal.   Eyes:      Conjunctiva/sclera: Conjunctivae normal. Pupils: Pupils are equal, round, and reactive to light. Comments: Fundi nl   Neck:      Thyroid: No thyromegaly. Cardiovascular:      Rate and Rhythm: Normal rate and regular rhythm. Heart sounds: Normal heart sounds. Pulmonary:      Effort: Pulmonary effort is normal.      Breath sounds: Normal breath sounds. No wheezing or rales. Abdominal:      General: Bowel sounds are normal.      Palpations: Abdomen is soft. There is no mass. Tenderness: There is no abdominal tenderness. Musculoskeletal:         General: Normal range of motion. Cervical back: Normal range of motion and neck supple. Lymphadenopathy:      Cervical: No cervical adenopathy. Skin:     General: Skin is warm and dry. Findings: No rash. Neurological:      Mental Status: He is alert and oriented to person, place, and time. Cranial Nerves: No cranial nerve deficit. Deep Tendon Reflexes: Reflexes are normal and symmetric. Assessment:       Diagnosis Orders   1. Benign prostatic hyperplasia with urinary frequency     2. Chronic midline low back pain without sciatica     3. ASHD (arteriosclerotic heart disease)           Plan:      Current Outpatient Medications   Medication Sig Dispense Refill    methylphenidate (RITALIN) 20 MG tablet Take 1 tablet by mouth 2 times daily for 90 days. 2-3 times a day 270 tablet 0    ketoconazole (NIZORAL) 2 % cream Apply topically daily. Am and  pm 30 g 1    meclizine (ANTIVERT) 25 MG tablet Take 25 mg by mouth 3 times daily as needed      hydrocortisone 1 % cream Apply topically 2 times daily Apply topically 2 times daily.  tamsulosin (FLOMAX) 0.4 MG capsule Take 1 capsule by mouth daily 90 capsule 3    ibuprofen (ADVIL;MOTRIN) 200 MG tablet Take 200 mg by mouth every 6 hours as needed for Pain      atorvastatin (LIPITOR) 40 MG tablet TAKE 1 TABLET BY MOUTH EVERY DAY  90 tablet 1    fluocinonide (LIDEX) 0.05 % gel Apply topically 2 times daily.  15 g 1  Multiple Vitamins-Minerals (ICAPS AREDS 2 PO) Take 1 capsule by mouth 2 times daily      ipratropium (ATROVENT) 0.06 % nasal spray 2 sprays by Nasal route daily as needed       cetirizine (ZYRTEC) 10 MG tablet Take 10 mg by mouth daily       B Complex Vitamins (VITAMIN-B COMPLEX PO) Take 1 tablet by mouth daily.  Calcium Carbonate-Vitamin D (CALCIUM 500/VITAMIN D PO) Take 1 tablet by mouth daily.  aspirin 81 MG chewable tablet Take 81 mg by mouth daily. No current facility-administered medications for this visit. low  Back ?   flomax and off  flomax and   See if  Urine   Gets  Worse  And if  Back better    See urology         Norman Guillory MD

## 2021-09-15 NOTE — PROGRESS NOTES
[place] [address] [facility] [id number]  Dept: [phone]  Dept Fax: [phone]: [address] [facility] -     Patient:  Ge Doan  YOB: 1950  Date: 9/16/2021    The patient is a 80 y.o. male who presents today for evaluation of the following problems:   Chief Complaint   Patient presents with    1 Month Follow-Up     bph    referred/consultation requested by Bryan Mojica MD.    HISTORY OF PRESENT ILLNESS:     BPH    On flomax now, might be causing back pain but he is voiding well. PVR acceptable today. Last visit:  Was in ED Thursday  Progressively worsening   flomax caused runny nose years ago but did not do anything more  auass 29 mostly dissatisfied  Last psa 3.53    Cystoscopy 8- showed LLH++, no median lobe. Mild trabeculation. Greenlight PVP was discussed.         Requested/reviewed records from Bryan Mojica MD office and/or outside physician/EMR    (Patient's old records have been requested, reviewed and pertinent findings summarized in today's note.)    Procedures Today: N/A      Last several PSA's:  Lab Results   Component Value Date    PSA 2.92 (H) 03/02/2017    PSA 2.64 (H) 02/04/2016    PSA 2.24 02/19/2015       Last total testosterone:  No results found for: TESTOSTERONE    Urinalysis today:  Results for POC orders placed in visit on 09/16/21   POCT Urinalysis No Micro (Auto)   Result Value Ref Range    Glucose, Ur Negative NEGATIVE mg/dl    Bilirubin Urine Negative     Ketones, Urine Negative NEGATIVE    Specific Gravity, Urine 1.025 1.002 - 1.030    Blood, UA POC Negative NEGATIVE    pH, Urine 6.00 5.0 - 9.0    Protein, Urine Negative NEGATIVE mg/dl    Urobilinogen, Urine 0.20 0.0 - 1.0 eu/dl    Nitrite, Urine Negative NEGATIVE    Leukocyte Clumps, Urine Negative NEGATIVE    Color, Urine Yellow  hydrocortisone 1 % cream Apply topically 2 times daily Apply topically 2 times daily.  tamsulosin (FLOMAX) 0.4 MG capsule Take 1 capsule by mouth daily 90 capsule 3    ibuprofen (ADVIL;MOTRIN) 200 MG tablet Take 200 mg by mouth every 6 hours as needed for Pain      atorvastatin (LIPITOR) 40 MG tablet TAKE 1 TABLET BY MOUTH EVERY DAY  90 tablet 1    fluocinonide (LIDEX) 0.05 % gel Apply topically 2 times daily. 15 g 1    Multiple Vitamins-Minerals (ICAPS AREDS 2 PO) Take 1 capsule by mouth 2 times daily      ipratropium (ATROVENT) 0.06 % nasal spray 2 sprays by Nasal route daily as needed       cetirizine (ZYRTEC) 10 MG tablet Take 10 mg by mouth daily       B Complex Vitamins (VITAMIN-B COMPLEX PO) Take 1 tablet by mouth daily.  Calcium Carbonate-Vitamin D (CALCIUM 500/VITAMIN D PO) Take 1 tablet by mouth daily.  aspirin 81 MG chewable tablet Take 81 mg by mouth daily. Polysporin [bacitracin-polymyxin b]  Social History     Tobacco Use   Smoking Status Former Smoker    Packs/day: 1.00    Years: 45.00    Pack years: 45.00    Types: Cigarettes    Quit date: 2005    Years since quittin.2   Smokeless Tobacco Never Used      (If patient a smoker, smoking cessation counseling offered)   Social History     Substance and Sexual Activity   Alcohol Use Yes    Alcohol/week: 8.0 standard drinks    Types: 8 Cans of beer per week    Comment: social       REVIEW OF SYSTEMS:  Constitutional: negative  Eyes: negative  Respiratory: negative  Cardiovascular: negative  Gastrointestinal: negative  Genitourinary: see HPI  Musculoskeletal: negative  Skin: negative   Neurological: negative  Hematological/Lymphatic: negative  Psychological: negative        Physical Exam:    This a 80 y.o. male  Vitals:    21 0944   Resp: 16     Body mass index is 23.65 kg/m². Constitutional: Patient in no acute distress;       Assessment and Plan        1.  Benign localized prostatic hyperplasia with lower urinary tract symptoms (LUTS)               Plan:      Discussed bph etiology-- he is on flomax, he is going to trial going off of it to see if back pain resolves. He has catheters at home just in case. Discussed rapaflo if flomax causes side effects. FU in 4 weeks. Prescriptions Ordered:  No orders of the defined types were placed in this encounter.      Orders Placed:  Orders Placed This Encounter   Procedures    poct post void residual     Per bladder scan    POCT Urinalysis No Micro (Auto)     Ordered by an unspecified provider              NEHA Kahn - CNP

## 2021-09-16 ENCOUNTER — OFFICE VISIT (OUTPATIENT)
Dept: UROLOGY | Age: 71
End: 2021-09-16
Payer: COMMERCIAL

## 2021-09-16 VITALS — BODY MASS INDEX: 23.7 KG/M2 | RESPIRATION RATE: 16 BRPM | HEIGHT: 67 IN | WEIGHT: 151 LBS

## 2021-09-16 DIAGNOSIS — N40.1 BENIGN LOCALIZED PROSTATIC HYPERPLASIA WITH LOWER URINARY TRACT SYMPTOMS (LUTS): Primary | ICD-10-CM

## 2021-09-16 LAB
BILIRUBIN URINE: NEGATIVE
BLOOD URINE, POC: NEGATIVE
CHARACTER, URINE: CLEAR
COLOR, URINE: YELLOW
GLUCOSE URINE: NEGATIVE MG/DL
KETONES, URINE: NEGATIVE
LEUKOCYTE CLUMPS, URINE: NEGATIVE
NITRITE, URINE: NEGATIVE
PH, URINE: 6 (ref 5–9)
POST VOID RESIDUAL (PVR): 47 ML
PROTEIN, URINE: NEGATIVE MG/DL
SPECIFIC GRAVITY, URINE: 1.02 (ref 1–1.03)
UROBILINOGEN, URINE: 0.2 EU/DL (ref 0–1)

## 2021-09-16 PROCEDURE — 51798 US URINE CAPACITY MEASURE: CPT | Performed by: NURSE PRACTITIONER

## 2021-09-16 PROCEDURE — 99213 OFFICE O/P EST LOW 20 MIN: CPT | Performed by: NURSE PRACTITIONER

## 2021-09-28 ENCOUNTER — OFFICE VISIT (OUTPATIENT)
Dept: CARDIOLOGY CLINIC | Age: 71
End: 2021-09-28
Payer: COMMERCIAL

## 2021-09-28 VITALS
WEIGHT: 151.4 LBS | HEART RATE: 83 BPM | HEIGHT: 67 IN | BODY MASS INDEX: 23.76 KG/M2 | DIASTOLIC BLOOD PRESSURE: 70 MMHG | SYSTOLIC BLOOD PRESSURE: 146 MMHG

## 2021-09-28 DIAGNOSIS — R06.02 SHORTNESS OF BREATH: ICD-10-CM

## 2021-09-28 DIAGNOSIS — I25.10 CORONARY ARTERY DISEASE INVOLVING NATIVE CORONARY ARTERY OF NATIVE HEART WITHOUT ANGINA PECTORIS: Primary | ICD-10-CM

## 2021-09-28 DIAGNOSIS — I48.0 PAROXYSMAL ATRIAL FIBRILLATION (HCC): ICD-10-CM

## 2021-09-28 PROCEDURE — 99214 OFFICE O/P EST MOD 30 MIN: CPT | Performed by: NUCLEAR MEDICINE

## 2021-09-28 NOTE — PROGRESS NOTES
[facility]AC[facility]  [address] [facility] [address]  Dept: [phone]  Dept Fax: [phone]  Loc: [phone]    Visit Date: 9/28/2021    Villa Siddiqui is a 80 y.o. male who presents todayfor:  Chief Complaint   Patient presents with    Check-Up    Coronary Artery Disease    Atrial Fibrillation    Hypertension   had a stress test   Had SVT during walking   Event monitor confirmed SVT and A fib   Does have dyspnea on exertion   No chest pain   No ischemia on the stress test   Echo was okay   No known A fib before  Did have TIA before   KRISTIE vasc 3 based on age   Not bad on caffeine  BP is stable, no HTN usually no meds   No DM   No dizziness  No syncope  On statins for hyperlipidemia        HPI:  HPI  Past Medical History:   Diagnosis Date    Actinic keratoses     Angina effort      Replacing deprecated diagnoses    Arthritis     hands    ASHD (arteriosclerotic heart disease)     Carotid disease, bilateral ([address] 2017    Colon polyps 5/07    Hypercholesteremia 7/97    Narcolepsy     Rotator cuff (capsule) sprain     Tobacco abuse     Unspecified sleep apnea       Past Surgical History:   Procedure Laterality Date    APPENDECTOMY      CATARACT REMOVAL Bilateral 2016    Right- Oct, [facility]- [facility]- [facility]     COLONOSCOPY  2007,         CORONARY ANGIOPLASTY WITH STENT PLACEMENT  7/05    CORONARY ANGIOPLASTY WITH STENT PLACEMENT  11/2017    2 nd stent to LAD    HEMORRHOID SURGERY  09/2016    Dr Benedicto Pena, every 2 weeks x 3 times     [facility] 2015    [facility] Francis Beltran @ [facility]   rt 2015    TONSILLECTOMY       Family History   Problem Relation Age of Onset    Stroke Mother     Diabetes Mother     Heart Disease Father     Asthma Father      Social History     Tobacco Use    Smoking status: Former Smoker     Packs/day: 1.00     Years: 45.00     Pack years: 45.00     Types: Cigarettes     Quit date: 2005     Years since quittin.2    Smokeless tobacco: Never Used   Substance Use Topics    Alcohol use: Yes     Alcohol/week: 8.0 standard drinks     Types: 8 Cans of beer per week     Comment: social      Current Outpatient Medications   Medication Sig Dispense Refill    methylphenidate (RITALIN) 20 MG tablet Take 1 tablet by mouth 2 times daily for 90 days. 2-3 times a day 270 tablet 0    ketoconazole (NIZORAL) 2 % cream Apply topically daily. Am and  pm 30 g 1    meclizine (ANTIVERT) 25 MG tablet Take 25 mg by mouth 3 times daily as needed      hydrocortisone 1 % cream Apply topically 2 times daily Apply topically 2 times daily.  tamsulosin (FLOMAX) 0.4 MG capsule Take 1 capsule by mouth daily 90 capsule 3    ibuprofen (ADVIL;MOTRIN) 200 MG tablet Take 200 mg by mouth every 6 hours as needed for Pain      atorvastatin (LIPITOR) 40 MG tablet TAKE 1 TABLET BY MOUTH EVERY DAY  90 tablet 1    fluocinonide (LIDEX) 0.05 % gel Apply topically 2 times daily. 15 g 1    Multiple Vitamins-Minerals (ICAPS AREDS 2 PO) Take 1 capsule by mouth 2 times daily      ipratropium (ATROVENT) 0.06 % nasal spray 2 sprays by Nasal route daily as needed       cetirizine (ZYRTEC) 10 MG tablet Take 10 mg by mouth daily       B Complex Vitamins (VITAMIN-B COMPLEX PO) Take 1 tablet by mouth daily.  Calcium Carbonate-Vitamin D (CALCIUM 500/VITAMIN D PO) Take 1 tablet by mouth daily.  aspirin 81 MG chewable tablet Take 81 mg by mouth daily. No current facility-administered medications for this visit.      Allergies   Allergen Reactions    Polysporin [Bacitracin-Polymyxin B] Hives     Health Maintenance   Topic Date Due    DTaP/Tdap/Td vaccine (1 - Tdap) 1997    Flu vaccine (1) 2021    Lipid screen  2021    PSA counseling  2021    Colon cancer screen colonoscopy  2022    Shingles Vaccine  Completed    Pneumococcal 65+ years Vaccine  Completed    COVID-19 Vaccine Completed    AAA screen  Completed    Hepatitis C screen  Completed    Hepatitis A vaccine  Aged Out    Hepatitis B vaccine  Aged Out    Hib vaccine  Aged Out    Meningococcal (ACWY) vaccine  Aged Out       Subjective:  Review of Systems  General:   No fever, no chills, some fatigue or weight loss  Pulmonary:    some dyspnea, no wheezing  Cardiac:    Denies recent chest pain,   GI:     No nausea or vomiting, no abdominal pain  Neuro:    No dizziness or light headedness,   Musculoskeletal:  No recent active issues  Extremities:   No edema, no obvious claudication       Objective:  Physical Exam  BP (!) 146/70   Pulse 83   Ht 5' 7\" (1.702 m)   Wt 151 lb 6.4 oz (68.7 kg)   BMI 23.71 kg/m²   General:   Well developed, well nourished  Lungs:   Clear to auscultation  Heart:    Normal S1 S2, Slight murmur. no rubs, no gallops  Abdomen:   Soft, non tender, no organomegalies, positive bowel sounds  Extremities:   No edema, no cyanosis, good peripheral pulses  Neurological:   Awake, alert, oriented. No obvious focal deficits  Musculoskelatal:  No obvious deformities    Assessment:      Diagnosis Orders   1. Coronary artery disease involving native coronary artery of native heart without angina pectoris     2. Shortness of breath     3. Paroxysmal atrial fibrillation (HCC)     as above  Likely A fib related symptoms  ?/ angina   Higher BP today   Usually borderline BP      Plan:  No follow-ups on file. Discussed  Discussed KRISTIE vasc score  Discussed his risk for a stroke had a TIA before   Discussed NOACs  Add beta blockers  Cath if needed after that   Continue risk factor modification and medical management  Thank you for allowing me to participate in the care of your patient. Please don't hesitate to contact me regarding any further issues related to the patient care    Orders Placed:  No orders of the defined types were placed in this encounter.       Medications Prescribed:  No orders of the defined types were placed in this encounter. Discussed use, benefit, and side effects of prescribed medications. All patient questions answered. Pt voicedunderstanding. Instructed to continue current medications, diet and exercise. Continue risk factor modification and medical management. Patient agreed with treatment plan. Follow up as directed.     Electronically signedby Galilea Kramer MD on 9/28/2021 at 9:47 AM

## 2021-10-07 ENCOUNTER — TELEPHONE (OUTPATIENT)
Dept: UROLOGY | Age: 71
End: 2021-10-07

## 2021-10-07 RX ORDER — SILODOSIN 8 MG/1
8 CAPSULE ORAL EVERY EVENING
Qty: 30 CAPSULE | Refills: 11 | Status: SHIPPED | OUTPATIENT
Start: 2021-10-07 | End: 2022-10-05 | Stop reason: SDUPTHER

## 2021-10-07 NOTE — TELEPHONE ENCOUNTER
Since he last seen you. Dr Isabel Galan has dx him with a fib. He was started on eliquis and metoprolol on 10/01/2021.

## 2021-10-07 NOTE — TELEPHONE ENCOUNTER
Patient stated when he restarted the flomax it caused back pain again. He stopped it for 5 days and restarted it on 09/21/2021. What can be ordered instead? Does he need the appointment on the 10/13/2021 or can a different medication be ordered and the appointment cancelled? Please advise.  Thank you

## 2021-10-07 NOTE — TELEPHONE ENCOUNTER
Patient advised rapaflo was sent to the pharmacy and appointment rescheduled.  He voiced understanding

## 2021-10-12 ENCOUNTER — TELEPHONE (OUTPATIENT)
Dept: CARDIOLOGY CLINIC | Age: 71
End: 2021-10-12

## 2021-10-12 NOTE — TELEPHONE ENCOUNTER
Patient called [employer] Eliquis Copay cards. We are out of copay cards. I called [employer]ag[employer] rep at [phone] and she states [place] is our rep but she is on vacation. She sent a card with ID#[id number]: [id number]  GROUP: [id number]  PCN: [id number]    I called patient's [facility] and spoke to Dasia Pena and she was going to put this copay card on his file for his next fill. She states he received a 30 day supply on 10/1/2021. LM for patient to call our office back.

## 2021-10-21 ENCOUNTER — NURSE ONLY (OUTPATIENT)
Dept: FAMILY MEDICINE CLINIC | Age: 71
End: 2021-10-21

## 2021-10-21 DIAGNOSIS — Z23 NEED FOR INFLUENZA VACCINATION: Primary | ICD-10-CM

## 2021-10-21 PROCEDURE — 90688 IIV4 VACCINE SPLT 0.5 ML IM: CPT | Performed by: FAMILY MEDICINE

## 2021-10-21 PROCEDURE — 90471 IMMUNIZATION ADMIN: CPT | Performed by: FAMILY MEDICINE

## 2021-10-21 NOTE — PROGRESS NOTES
Immunizations Administered     Name Date Dose Route    Influenza, Quadv, IM, (6 mo and older Fluzone, Flulaval, Fluarix and 3 yrs and older Afluria) 10/21/2021 0.5 mL Intramuscular    Site: Deltoid- Left    Lot: [id number]    NDC: 80623-877-68

## 2021-10-28 ENCOUNTER — TELEPHONE (OUTPATIENT)
Dept: CARDIOLOGY CLINIC | Age: 71
End: 2021-10-28

## 2021-10-28 NOTE — TELEPHONE ENCOUNTER
Pt called asking for Eliquis coupon card--coupon card placed at  and pt will stop by office and  today.

## 2021-11-01 ENCOUNTER — TELEPHONE (OUTPATIENT)
Dept: UROLOGY | Age: 71
End: 2021-11-01

## 2021-11-03 ENCOUNTER — OFFICE VISIT (OUTPATIENT)
Dept: UROLOGY | Age: 71
End: 2021-11-03
Payer: COMMERCIAL

## 2021-11-03 VITALS
DIASTOLIC BLOOD PRESSURE: 70 MMHG | SYSTOLIC BLOOD PRESSURE: 128 MMHG | BODY MASS INDEX: 24.01 KG/M2 | WEIGHT: 153 LBS | HEIGHT: 67 IN

## 2021-11-03 DIAGNOSIS — N40.1 BENIGN LOCALIZED PROSTATIC HYPERPLASIA WITH LOWER URINARY TRACT SYMPTOMS (LUTS): Primary | ICD-10-CM

## 2021-11-03 DIAGNOSIS — R35.0 URINARY FREQUENCY: ICD-10-CM

## 2021-11-03 LAB
BILIRUBIN URINE: NEGATIVE
BLOOD URINE, POC: NEGATIVE
CHARACTER, URINE: CLEAR
COLOR, URINE: YELLOW
GLUCOSE URINE: NEGATIVE MG/DL
KETONES, URINE: ABNORMAL
LEUKOCYTE CLUMPS, URINE: NEGATIVE
NITRITE, URINE: NEGATIVE
PH, URINE: 5.5 (ref 5–9)
POST VOID RESIDUAL (PVR): 33 ML
PROTEIN, URINE: NEGATIVE MG/DL
SPECIFIC GRAVITY, URINE: 1.02 (ref 1–1.03)
UROBILINOGEN, URINE: 0.2 EU/DL (ref 0–1)

## 2021-11-03 PROCEDURE — 81003 URINALYSIS AUTO W/O SCOPE: CPT | Performed by: NURSE PRACTITIONER

## 2021-11-03 PROCEDURE — 51798 US URINE CAPACITY MEASURE: CPT | Performed by: NURSE PRACTITIONER

## 2021-11-03 PROCEDURE — 99214 OFFICE O/P EST MOD 30 MIN: CPT | Performed by: NURSE PRACTITIONER

## 2021-11-03 NOTE — PROGRESS NOTES
[facility] [address] [facility] [id number]  Dept: [phone]  Dept Fax: [phone]: [address] [facility] -     Patient:  Genia Harris  YOB: 1950  Date: 11/3/2021    The patient is a 80 y.o. male who presents today for evaluation of the following problems:   Chief Complaint   Patient presents with    Benign Prostatic Hypertrophy     4 week follow up- pvr prior     referred/consultation requested by Salina Patino MD.    HISTORY OF PRESENT ILLNESS:     BPH   on Silodosin. flomax caused back pain. Some urgency and frequency. Drinks half decaf coffee. Up at night 1-2 times a night. Last visit:  Was in ED Thursday  Progressively worsening   flomax caused runny nose years ago but did not do anything more  auass 29 mostly dissatisfied  Last psa 3.53    Cystoscopy 8- showed LLH++, no median lobe. Mild trabeculation. Greenlight PVP was discussed.         Requested/reviewed records from Salina Patino MD office and/or outside physician/EMR    (Patient's old records have been requested, reviewed and pertinent findings summarized in today's note.)    Procedures Today: N/A      Last several PSA's:  Lab Results   Component Value Date    PSA 2.92 (H) 03/02/2017    PSA 2.64 (H) 02/04/2016    PSA 2.24 02/19/2015       Last total testosterone:  No results found for: TESTOSTERONE    Urinalysis today:  Results for POC orders placed in visit on 11/03/21   POCT Urinalysis No Micro (Auto)   Result Value Ref Range    Glucose, Ur Negative NEGATIVE mg/dl    Bilirubin Urine Negative     Ketones, Urine Trace (A) NEGATIVE    Specific Gravity, Urine 1.025 1.002 - 1.030    Blood, UA POC Negative NEGATIVE    pH, Urine 5.50 5.0 - 9.0    Protein, Urine Negative NEGATIVE mg/dl    Urobilinogen, Urine 0.20 0.0 - 1.0 eu/dl    Nitrite, Urine Negative NEGATIVE Leukocyte Clumps, Urine Negative NEGATIVE    Color, Urine Yellow [id number]-STRAW    Character, Urine Clear [id number]   poct post void residual   Result Value Ref Range    post void residual 33 ml       Last BUN and creatinine:  Lab Results   Component Value Date    BUN 20 08/06/2021     Lab Results   Component Value Date    CREATININE 0.9 08/06/2021         Imaging Reviewed during this Office Visit:   NEHA Bose CNP independently reviewed the images and verified the radiology reports from:    No results found.     PAST MEDICAL, FAMILY AND SOCIAL HISTORY:  Past Medical History:   Diagnosis Date    Actinic keratoses     Angina effort      Replacing deprecated diagnoses    Arthritis     hands    ASHD (arteriosclerotic heart disease)     Carotid disease, bilateral ([address] 2017    Colon polyps 5/07    Hypercholesteremia 7/97    Narcolepsy     Rotator cuff (capsule) sprain     Tobacco abuse     Unspecified sleep apnea      Past Surgical History:   Procedure Laterality Date    APPENDECTOMY      CATARACT REMOVAL Bilateral 2016    [facility]- Oct[facility]     COLONOSCOPY  2007,     sheik    CORONARY ANGIOPLASTY WITH STENT PLACEMENT  7/05    CORONARY ANGIOPLASTY WITH STENT PLACEMENT  11/2017    2 nd stent to LAD    HEMORRHOID SURGERY  09/2016    Dr Clyde Villatoro, every 2 weeks x 3 times    Oswald Muss Left 2015    Dr Maritza Sosa @ [facility]   rt 2015    TONSILLECTOMY       Family History   Problem Relation Age of Onset    Stroke Mother     Diabetes Mother     Heart Disease Father     Asthma Father      Outpatient Medications Marked as Taking for the 11/3/21 encounter (Office Visit) with NEHA Bose CNP   Medication Sig Dispense Refill    silodosin (RAPAFLO) 8 MG CAPS Take 1 capsule by mouth every evening 30 capsule 11    apixaban (ELIQUIS) 5 MG TABS tablet Take 1 tablet by mouth 2 times daily 180 tablet 3    metoprolol tartrate (LOPRESSOR) 25 MG tablet Take 1 tablet by mouth 2 times daily 180 tablet 3    methylphenidate (RITALIN) 20 MG tablet Take 1 tablet by mouth 2 times daily for 90 days. 2-3 times a day 270 tablet 0    ketoconazole (NIZORAL) 2 % cream Apply topically daily. Am and  pm 30 g 1    meclizine (ANTIVERT) 25 MG tablet Take 25 mg by mouth 3 times daily as needed      hydrocortisone 1 % cream Apply topically 2 times daily Apply topically 2 times daily.  ibuprofen (ADVIL;MOTRIN) 200 MG tablet Take 200 mg by mouth every 6 hours as needed for Pain      atorvastatin (LIPITOR) 40 MG tablet TAKE 1 TABLET BY MOUTH EVERY DAY  90 tablet 1    fluocinonide (LIDEX) 0.05 % gel Apply topically 2 times daily. 15 g 1    Multiple Vitamins-Minerals (ICAPS AREDS 2 PO) Take 1 capsule by mouth 2 times daily      ipratropium (ATROVENT) 0.06 % nasal spray 2 sprays by Nasal route daily as needed       cetirizine (ZYRTEC) 10 MG tablet Take 10 mg by mouth daily       Calcium Carbonate-Vitamin D (CALCIUM 500/VITAMIN D PO) Take 1 tablet by mouth daily.  aspirin 81 MG chewable tablet Take 81 mg by mouth daily.            Adderall [amphetamine-dextroamphetamine], Flomax [tamsulosin], Other, Polysporin [bacitracin-polymyxin b], and Provigil [modafinil]  Social History     Tobacco Use   Smoking Status Former Smoker    Packs/day: 1.00    Years: 45.00    Pack years: 45.00    Types: Cigarettes    Quit date: 2005    Years since quittin.3   Smokeless Tobacco Never Used      (If patient a smoker, smoking cessation counseling offered)   Social History     Substance and Sexual Activity   Alcohol Use Yes    Alcohol/week: 8.0 standard drinks    Types: 8 Cans of beer per week    Comment: social       REVIEW OF SYSTEMS:  Constitutional: negative  Eyes: negative  Respiratory: negative  Cardiovascular: negative  Gastrointestinal: negative  Genitourinary: see HPI  Musculoskeletal: negative  Skin: negative   Neurological: negative  Hematological/Lymphatic: negative  Psychological: negative        Physical Exam:    This a 80 y.o. male  Vitals:    11/03/21 1012   BP: 128/70     Body mass index is 23.96 kg/m². Constitutional: Patient in no acute distress;       Assessment and Plan        1. Benign localized prostatic hyperplasia with lower urinary tract symptoms (LUTS)    2. Urinary frequency               Plan:      Discussed bph etiology-- started on rapaflo, and helping. Without side effects. Myrbetriq samples given to patient for urgency and frequency. FU in 4 weeks. Will call if medication helping, can send in prescription. Prescriptions Ordered:  No orders of the defined types were placed in this encounter.      Orders Placed:  Orders Placed This Encounter   Procedures    POCT Urinalysis No Micro (Auto)    poct post void residual     Bladder scan            Shannen Cohn, NEHA - CNP

## 2021-12-02 LAB
ABSOLUTE BASO #: 0 X10E9/L (ref 0–0.2)
ABSOLUTE EOS #: 0.3 X10E9/L (ref 0–0.4)
ABSOLUTE LYMPH #: 1.2 X10E9/L (ref 1–3.5)
ABSOLUTE MONO #: 0.5 X10E9/L (ref 0–0.9)
ABSOLUTE NEUT #: 2.7 X10E9/L (ref 1.5–6.6)
ALBUMIN SERPL-MCNC: 4.4 G/DL (ref 3.2–5.3)
ALK PHOSPHATASE: 89 U/L (ref 39–130)
ALT SERPL-CCNC: 18 U/L (ref 0–40)
ANION GAP SERPL CALCULATED.3IONS-SCNC: 9 MMOL/L (ref 5–15)
AST SERPL-CCNC: 16 U/L (ref 0–41)
BASOPHILS RELATIVE PERCENT: 1 %
BILIRUB SERPL-MCNC: 1.3 MG/DL (ref 0.3–1.2)
BUN BLDV-MCNC: 16 MG/DL (ref 5–27)
CALCIUM SERPL-MCNC: 8.9 MG/DL (ref 8.5–10.5)
CHLORIDE BLD-SCNC: 107 MMOL/L (ref 98–109)
CHOLESTEROL/HDL RATIO: 3.2 (ref 1–5)
CHOLESTEROL: 164 MG/DL (ref 150–200)
CO2: 28 MMOL/L (ref 22–32)
CREAT SERPL-MCNC: 1.03 MG/DL (ref 0.6–1.3)
EGFR AFRICAN AMERICAN: >60 ML/MIN/1.73SQ.M
EGFR IF NONAFRICAN AMERICAN: >60 ML/MIN/1.73SQ.M
EOSINOPHILS RELATIVE PERCENT: 5.6 %
GLUCOSE: 100 MG/DL (ref 65–99)
HCT VFR BLD CALC: 49.1 % (ref 39–49)
HDLC SERPL-MCNC: 51 MG/DL
HEMOGLOBIN: 16.6 G/DL (ref 13–17)
LDL CHOLESTEROL CALCULATED: 98 MG/DL
LDL/HDL RATIO: 1.9
LYMPHOCYTE %: 25.4 %
MCH RBC QN AUTO: 31.8 PG (ref 27–34)
MCHC RBC AUTO-ENTMCNC: 33.9 G/DL (ref 32–36)
MCV RBC AUTO: 94 FL (ref 80–100)
MONOCYTES # BLD: 9.8 %
NEUTROPHILS RELATIVE PERCENT: 58.2 %
PDW BLD-RTO: 12.8 % (ref 11.5–15)
PLATELETS: 222 X10E9/L (ref 150–450)
PMV BLD AUTO: 7.7 FL (ref 7–12)
POTASSIUM SERPL-SCNC: 4.2 MMOL/L (ref 3.5–5)
PSA, ULTRASENSITIVE: 3.1 NG/ML (ref 0–4)
RBC: 5.23 X10E12/L (ref 4.1–5.7)
SODIUM BLD-SCNC: 144 MMOL/L (ref 134–146)
TOTAL PROTEIN: 6.6 G/DL (ref 6–8)
TRIGL SERPL-MCNC: 73 MG/DL (ref 27–150)
TSH SERPL DL<=0.05 MIU/L-ACNC: 2.74 UIU/ML (ref 0.49–4.67)
VLDLC SERPL CALC-MCNC: 15 MG/DL (ref 0–30)
WBC: 4.7 X10E9/L (ref 4–11)

## 2021-12-06 ENCOUNTER — TELEPHONE (OUTPATIENT)
Dept: FAMILY MEDICINE CLINIC | Age: 71
End: 2021-12-06

## 2021-12-06 NOTE — TELEPHONE ENCOUNTER
----- Message from Radha Richardson MD sent at 12/6/2021  5:39 AM EST -----  Labs ok and chol good but slight up  but on meds and keep appt

## 2021-12-08 ENCOUNTER — OFFICE VISIT (OUTPATIENT)
Dept: UROLOGY | Age: 71
End: 2021-12-08
Payer: COMMERCIAL

## 2021-12-08 VITALS
HEIGHT: 67 IN | SYSTOLIC BLOOD PRESSURE: 134 MMHG | DIASTOLIC BLOOD PRESSURE: 72 MMHG | WEIGHT: 153 LBS | BODY MASS INDEX: 24.01 KG/M2

## 2021-12-08 DIAGNOSIS — N40.1 BENIGN LOCALIZED PROSTATIC HYPERPLASIA WITH LOWER URINARY TRACT SYMPTOMS (LUTS): Primary | ICD-10-CM

## 2021-12-08 LAB
BILIRUBIN URINE: NEGATIVE
BLOOD URINE, POC: NEGATIVE
CHARACTER, URINE: CLEAR
COLOR, URINE: YELLOW
GLUCOSE URINE: NEGATIVE MG/DL
KETONES, URINE: NEGATIVE
LEUKOCYTE CLUMPS, URINE: NEGATIVE
NITRITE, URINE: NEGATIVE
PH, URINE: 5.5 (ref 5–9)
POST VOID RESIDUAL (PVR): 23 ML
PROTEIN, URINE: NEGATIVE MG/DL
SPECIFIC GRAVITY, URINE: >= 1.03 (ref 1–1.03)
UROBILINOGEN, URINE: 0.2 EU/DL (ref 0–1)

## 2021-12-08 PROCEDURE — 81003 URINALYSIS AUTO W/O SCOPE: CPT | Performed by: NURSE PRACTITIONER

## 2021-12-08 PROCEDURE — 51798 US URINE CAPACITY MEASURE: CPT | Performed by: NURSE PRACTITIONER

## 2021-12-08 PROCEDURE — 99214 OFFICE O/P EST MOD 30 MIN: CPT | Performed by: NURSE PRACTITIONER

## 2021-12-08 NOTE — PROGRESS NOTES
[facility] [address] [address]  Dept: [phone]  Dept Fax: [phone] : [phone] [address] Urology Office Note -     Patient:  Betsy Rose  YOB: 1950  Date: 12/8/2021    The patient is a 80 y.o. male who presents today for evaluation of the following problems:   Chief Complaint   Patient presents with    Follow-up    Benign Prostatic Hypertrophy     with luts     referred/consultation requested by Yobany Gr MD.    HISTORY OF PRESENT ILLNESS:     BPH   on Silodosin. flomax caused back pain. Some urgency and frequency. Drinks half decaf coffee. Up at night 1-2 times a night. Started on Myrbetriq last visit, caused retention, and difficulty starting stream.  Hasn't had alcohol, which is a trigger. Symptoms tolerable at this time. PSA 3.10    Last visit:    Hx of retention  Progressively worsening   flomax caused runny nose years ago but did not do anything more  auass 29 mostly dissatisfied  Last psa 3.53    Cystoscopy 8- showed LLH++, no median lobe. Mild trabeculation. Greenlight PVP was discussed.         Requested/reviewed records from Yobany Gr MD office and/or outside physician/EMR    (Patient's old records have been requested, reviewed and pertinent findings summarized in today's note.)    Procedures Today: N/A      Last several PSA's:  Lab Results   Component Value Date    PSA 2.92 (H) 03/02/2017    PSA 2.64 (H) 02/04/2016    PSA 2.24 02/19/2015       Last total testosterone:  No results found for: TESTOSTERONE    Urinalysis today:  Results for POC orders placed in visit on 12/08/21   POCT Urinalysis No Micro (Auto)   Result Value Ref Range    Glucose, Ur Negative NEGATIVE mg/dl    Bilirubin Urine Negative     Ketones, Urine Negative NEGATIVE    Specific Gravity, Urine >= 1.030 1.002 - 1.030    Blood, UA POC Negative NEGATIVE    pH, Urine 5.50 5.0 - 9.0    Protein, Urine Negative NEGATIVE mg/dl    Urobilinogen, Urine 0.20 0.0 - 1.0 eu/dl    Nitrite, Urine Negative NEGATIVE    Leukocyte Clumps, Urine Negative NEGATIVE    Color, Urine Yellow YELLOW-STRAW    Character, Urine Clear [id number].[id number]   poct post void residual   Result Value Ref Range    post void residual 23 ml       Last BUN and creatinine:  Lab Results   Component Value Date    BUN 16 12/02/2021     Lab Results   Component Value Date    CREATININE 1.03 12/02/2021         Imaging Reviewed during this Office Visit:   NEHA Reid CNP independently reviewed the images and verified the radiology reports from:    No results found.     PAST MEDICAL, FAMILY AND SOCIAL HISTORY:  Past Medical History:   Diagnosis Date    Actinic keratoses     Angina effort      Replacing deprecated diagnoses    Arthritis     hands    ASHD (arteriosclerotic heart disease)     Carotid disease, bilateral ([address] 2017    Colon polyps 5/07    Hypercholesteremia 7/97    Narcolepsy     Rotator cuff (capsule) sprain     Tobacco abuse     Unspecified sleep apnea      Past Surgical History:   Procedure Laterality Date    APPENDECTOMY      CATARACT REMOVAL Bilateral 2016    Right- Oct, [facility]- [facility]- [facility]     COLONOSCOPY  2007,     sheik    CORONARY ANGIOPLASTY WITH STENT PLACEMENT  7/05    CORONARY ANGIOPLASTY WITH STENT PLACEMENT  11/2017    2 nd stent to LAD    HEMORRHOID SURGERY  09/2016    Dr Nuris Westfall, every 2 weeks x 3 times    [facility] 2015    Dr Yuli Gomes @ [facility] 2015    TONSILLECTOMY       Family History   Problem Relation Age of Onset    Stroke Mother     Diabetes Mother     Heart Disease Father     Asthma Father      Outpatient Medications Marked as Taking for the 12/8/21 encounter (Office Visit) with NEHA Reid CNP   Medication Sig Dispense Refill    mirabegron (MYRBETRIQ) 25 MG TB24 Take 1 tablet by mouth daily 28 tablet 0    silodosin (RAPAFLO) 8 MG CAPS Take 1 capsule by mouth every evening 30 capsule 11    apixaban (ELIQUIS) 5 MG TABS tablet Take 1 tablet by mouth 2 times daily 180 tablet 3    metoprolol tartrate (LOPRESSOR) 25 MG tablet Take 1 tablet by mouth 2 times daily 180 tablet 3    ketoconazole (NIZORAL) 2 % cream Apply topically daily. Am and  pm 30 g 1    meclizine (ANTIVERT) 25 MG tablet Take 25 mg by mouth 3 times daily as needed      hydrocortisone 1 % cream Apply topically 2 times daily Apply topically 2 times daily.  ibuprofen (ADVIL;MOTRIN) 200 MG tablet Take 200 mg by mouth every 6 hours as needed for Pain      atorvastatin (LIPITOR) 40 MG tablet TAKE 1 TABLET BY MOUTH EVERY DAY  90 tablet 1    fluocinonide (LIDEX) 0.05 % gel Apply topically 2 times daily. 15 g 1    Multiple Vitamins-Minerals (ICAPS AREDS 2 PO) Take 1 capsule by mouth 2 times daily      ipratropium (ATROVENT) 0.06 % nasal spray 2 sprays by Nasal route daily as needed       cetirizine (ZYRTEC) 10 MG tablet Take 10 mg by mouth daily       Calcium Carbonate-Vitamin D (CALCIUM 500/VITAMIN D PO) Take 1 tablet by mouth daily.  aspirin 81 MG chewable tablet Take 81 mg by mouth daily.            Adderall [amphetamine-dextroamphetamine], Flomax [tamsulosin], Other, Polysporin [bacitracin-polymyxin b], and Provigil [modafinil]  Social History     Tobacco Use   Smoking Status Former Smoker    Packs/day: 1.00    Years: 45.00    Pack years: 45.00    Types: Cigarettes    Quit date: 2005    Years since quittin.4   Smokeless Tobacco Never Used      (If patient a smoker, smoking cessation counseling offered)   Social History     Substance and Sexual Activity   Alcohol Use Yes    Alcohol/week: 8.0 standard drinks    Types: 8 Cans of beer per week    Comment: social       REVIEW OF SYSTEMS:  Constitutional: negative  Eyes: negative  Respiratory: negative  Cardiovascular:

## 2021-12-21 ENCOUNTER — OFFICE VISIT (OUTPATIENT)
Dept: PULMONOLOGY | Age: 71
End: 2021-12-21
Payer: COMMERCIAL

## 2021-12-21 VITALS
HEIGHT: 67 IN | DIASTOLIC BLOOD PRESSURE: 72 MMHG | TEMPERATURE: 97.5 F | BODY MASS INDEX: 24.33 KG/M2 | WEIGHT: 155 LBS | HEART RATE: 72 BPM | SYSTOLIC BLOOD PRESSURE: 120 MMHG | OXYGEN SATURATION: 99 %

## 2021-12-21 DIAGNOSIS — G47.31 COMPLEX SLEEP APNEA SYNDROME: Primary | ICD-10-CM

## 2021-12-21 DIAGNOSIS — R41.89 BRAIN FOG: ICD-10-CM

## 2021-12-21 DIAGNOSIS — R40.0 DAYTIME SLEEPINESS: ICD-10-CM

## 2021-12-21 PROCEDURE — 99214 OFFICE O/P EST MOD 30 MIN: CPT | Performed by: NURSE PRACTITIONER

## 2021-12-21 RX ORDER — METHYLPHENIDATE HYDROCHLORIDE 20 MG/1
20 TABLET ORAL 2 TIMES DAILY
COMMUNITY
End: 2022-01-18

## 2021-12-21 RX ORDER — MECLIZINE HYDROCHLORIDE 25 MG/1
25 TABLET ORAL 3 TIMES DAILY PRN
COMMUNITY
End: 2022-02-18

## 2021-12-21 RX ORDER — SOLRIAMFETOL 150 MG/1
150 TABLET, FILM COATED ORAL DAILY
Qty: 90 TABLET | Refills: 0 | Status: SHIPPED | OUTPATIENT
Start: 2021-12-21 | End: 2022-05-10 | Stop reason: SDUPTHER

## 2021-12-21 ASSESSMENT — ENCOUNTER SYMPTOMS
EYES NEGATIVE: 1
WHEEZING: 0
RESPIRATORY NEGATIVE: 1
ALLERGIC/IMMUNOLOGIC NEGATIVE: 1
GASTROINTESTINAL NEGATIVE: 1
DIARRHEA: 0
CHEST TIGHTNESS: 0
STRIDOR: 0
VOMITING: 0
NAUSEA: 0

## 2021-12-21 NOTE — PROGRESS NOTES
[facility]      Alvino Herring         [id number]  12/21/2021   Chief Complaint   Patient presents with    Follow-up     JAY 1 yr sleep tami f/u        Pt of Dr. Renita KOVACS Download:   Original or initial AHI: 14     Date of initial study: 12/27/00      Compliant  100%     Noncompliant 0 %     PAP Type aircruveLevel  15/3   Avg Hrs/Day 7 hours and 38 min  AHI: 0.3   Recorded compliance dates , 11/21/21  to 12/20/21   Machine/Mfg:   [x] ResMed    [] Respironics/Dreamstation   Interface:   [x] Nasal    [] Nasal pillows   [] FFM       Provider:      [] [facility]     [][facility]     [] [employer]    [x] [address]    [] [place]               [] [facility]      [] [employer]    [] [address]      [] Other    Neck Size: 13.75 Mallampati Mallampati 3  ESS:  13  SAQLI: 63    Here is a scan of the most recent download:              Presentation:   [address] [place] [address] presents for sleep medicine follow up for Complex Sleep Apnea. Since the last visit, [address] [place] [address] has been doing well with his PAP therapy and continues to see benefit from its use. Patient was concerned about any reaction with his Ritalin he currently takes 2-3 days a week for his brain fog. No interaction found between the Eliquis, Silodosin, and his Ritalin. Patient doesn't feel like Ritalin is working as effectively- 7, 12, and 5PM- isn't lasting as long   Adderal gives SOB; Provigil low back pain     Equipment issues: The pressure is  acceptable, the mask is acceptable     Sleep issues:  Do you feel better? Yes  More rested? Yes   Better concentration? yes    Review of Systems -   Review of Systems   Constitutional: Negative. Negative for chills, fever and unexpected weight change. HENT: Negative. Eyes: Negative. Respiratory: Negative. Negative for chest tightness, wheezing and stridor. Cardiovascular: Negative for chest pain and leg swelling. Gastrointestinal: Negative.   Negative for diarrhea, nausea and vomiting. Endocrine: Negative. Genitourinary: Negative. Negative for dysuria. Musculoskeletal: Negative. Skin: Negative. Allergic/Immunologic: Negative. Neurological: Negative. Hematological: Negative. Psychiatric/Behavioral: Negative. Physical Exam:    BMI:  Body mass index is 24.28 kg/m². Wt Readings from Last 3 Encounters:   12/21/21 155 lb (70.3 kg)   12/08/21 153 lb (69.4 kg)   11/03/21 153 lb (69.4 kg)     Weight stable / unchanged  Vitals: /72 (Site: Left Upper Arm, Position: Sitting, Cuff Size: Large Adult)   Pulse 72   Temp 97.5 °F (36.4 °C)   Ht 5' 7\" (1.702 m)   Wt 155 lb (70.3 kg)   SpO2 99% Comment: on r/a  BMI 24.28 kg/m²       Physical Exam  Vitals and nursing note reviewed. Constitutional:       General: He is not in acute distress. Appearance: He is well-developed. HENT:      Head: Normocephalic and atraumatic. Neck:      Trachea: No tracheal deviation. Cardiovascular:      Rate and Rhythm: Normal rate and regular rhythm. Heart sounds: Normal heart sounds. No murmur heard. Pulmonary:      Effort: Pulmonary effort is normal. No respiratory distress. Breath sounds: Normal breath sounds. No stridor. No wheezing or rales. Chest:      Chest wall: No tenderness. Abdominal:      General: Bowel sounds are normal. There is no distension. Palpations: Abdomen is soft. Skin:     General: Skin is warm and dry. Capillary Refill: Capillary refill takes less than 2 seconds. Neurological:      Mental Status: He is alert and oriented to person, place, and time. Psychiatric:         Behavior: Behavior normal.         Thought Content: Thought content normal.         Judgment: Judgment normal.           ASSESSMENT/DIAGNOSIS     Diagnosis Orders   1. Complex sleep apnea syndrome     2. Brain fog  Solriamfetol HCl (SUNOSI) 150 MG TABS   3.  Daytime sleepiness  Solriamfetol HCl (SUNOSI) 150 MG TABS            Plan   Do you need any

## 2021-12-22 ENCOUNTER — TELEPHONE (OUTPATIENT)
Dept: CARDIOLOGY CLINIC | Age: 71
End: 2021-12-22

## 2021-12-22 NOTE — TELEPHONE ENCOUNTER
I have been on Metropolol since Oct. 1.  As time goes by, the effectiveness of the methylphenidate HCL I am taking for narcolepsy is being reduced. To eliminate Metroprolol as the reason, I wish to stop taking it for a short period. What is the best way to proceed with this action? PATIENT IS AWARE THAT DR Jonatan Rico IS OUT OF TOWN.

## 2021-12-27 ENCOUNTER — VIRTUAL VISIT (OUTPATIENT)
Dept: FAMILY MEDICINE CLINIC | Age: 71
End: 2021-12-27

## 2021-12-27 DIAGNOSIS — J01.20 ACUTE NON-RECURRENT ETHMOIDAL SINUSITIS: Primary | ICD-10-CM

## 2021-12-27 PROCEDURE — 99213 OFFICE O/P EST LOW 20 MIN: CPT | Performed by: FAMILY MEDICINE

## 2021-12-27 RX ORDER — DILTIAZEM HYDROCHLORIDE 120 MG/1
120 CAPSULE, COATED, EXTENDED RELEASE ORAL DAILY
Qty: 90 CAPSULE | Refills: 3 | Status: SHIPPED | OUTPATIENT
Start: 2021-12-27 | End: 2022-04-21

## 2021-12-27 RX ORDER — IPRATROPIUM BROMIDE 42 UG/1
2 SPRAY, METERED NASAL 3 TIMES DAILY
Qty: 15 ML | Refills: 1 | Status: SHIPPED | OUTPATIENT
Start: 2021-12-27 | End: 2022-02-18 | Stop reason: SDUPTHER

## 2021-12-27 RX ORDER — AMOXICILLIN AND CLAVULANATE POTASSIUM 875; 125 MG/1; MG/1
1 TABLET, FILM COATED ORAL 2 TIMES DAILY
Qty: 20 TABLET | Refills: 0 | Status: SHIPPED | OUTPATIENT
Start: 2021-12-27 | End: 2022-01-06

## 2021-12-27 RX ORDER — DILTIAZEM HYDROCHLORIDE 120 MG/1
120 CAPSULE, COATED, EXTENDED RELEASE ORAL DAILY
COMMUNITY
End: 2021-12-27 | Stop reason: SDUPTHER

## 2021-12-27 ASSESSMENT — ENCOUNTER SYMPTOMS
COUGH: 0
NAUSEA: 0
BACK PAIN: 0
EYE PAIN: 0
TROUBLE SWALLOWING: 0
SINUS PRESSURE: 1
SHORTNESS OF BREATH: 0
BLOOD IN STOOL: 0
CHEST TIGHTNESS: 0
CONSTIPATION: 0
SORE THROAT: 0
ABDOMINAL PAIN: 0
SINUS COMPLAINT: 1

## 2021-12-27 NOTE — PROGRESS NOTES
Daisy Hatfield (:  1950) is a 80 y.o. male,Established patient, here for evaluation of the following chief complaint(s): Sinus Problem (symptoms started on 2021) and Other (bloody sinus drainage)         ASSESSMENT/     Diagnosis Orders   1. Acute non-recurrent ethmoidal sinusitis  amoxicillin-clavulanate (AUGMENTIN) 875-125 MG per tablet    ipratropium (ATROVENT) 0.06 % nasal spray       PLAN    Current Outpatient Medications   Medication Sig Dispense Refill    dilTIAZem (CARDIZEM CD) 120 MG extended release capsule Take 1 capsule by mouth daily 90 capsule 3    amoxicillin-clavulanate (AUGMENTIN) 875-125 MG per tablet Take 1 tablet by mouth 2 times daily for 10 days 20 tablet 0    ipratropium (ATROVENT) 0.06 % nasal spray 2 sprays by Each Nostril route 3 times daily 15 mL 1    methylphenidate (RITALIN) 20 MG tablet Take 20 mg by mouth 2 times daily.  meclizine (ANTIVERT) 25 MG tablet Take 25 mg by mouth 3 times daily as needed      silodosin (RAPAFLO) 8 MG CAPS Take 1 capsule by mouth every evening 30 capsule 11    apixaban (ELIQUIS) 5 MG TABS tablet Take 1 tablet by mouth 2 times daily 180 tablet 3    ketoconazole (NIZORAL) 2 % cream Apply topically daily. Am and  pm 30 g 1    hydrocortisone 1 % cream Apply topically 2 times daily Apply topically 2 times daily.  ibuprofen (ADVIL;MOTRIN) 200 MG tablet Take 200 mg by mouth every 6 hours as needed for Pain      atorvastatin (LIPITOR) 40 MG tablet TAKE 1 TABLET BY MOUTH EVERY DAY  90 tablet 1    fluocinonide (LIDEX) 0.05 % gel Apply topically 2 times daily. 15 g 1    Multiple Vitamins-Minerals (ICAPS AREDS 2 PO) Take 1 capsule by mouth 2 times daily      ipratropium (ATROVENT) 0.06 % nasal spray 2 sprays by Nasal route daily as needed       cetirizine (ZYRTEC) 10 MG tablet Take 10 mg by mouth daily       Calcium Carbonate-Vitamin D (CALCIUM 500/VITAMIN D PO) Take 1 tablet by mouth daily.       aspirin 81 MG chewable tablet Take for dizziness, weakness and headaches. Hematological: Negative for adenopathy. Does not bruise/bleed easily. Psychiatric/Behavioral: Negative for behavioral problems, dysphoric mood and sleep disturbance. No flowsheet data found. Physical Exam    [INSTRUCTIONS:  \"[x]\" Indicates a positive item  \"[]\" Indicates a negative item  -- DELETE ALL ITEMS NOT EXAMINED]    Constitutional: [x] Appears well-developed and well-nourished [x] No apparent distress      [] Abnormal -     Mental status: [x] Alert and awake  [x] Oriented to person/place/time [x] Able to follow commands    [] Abnormal -     Eyes:   EOM    [x]  Normal    [] Abnormal -   Sclera  [x]  Normal    [] Abnormal -          Discharge [x]  None visible   [] Abnormal -     HENT: [x] Normocephalic, atraumatic  [] Abnormal -   [x] Mouth/Throat: Mucous membranes are moist     congestion  External Ears [x] Normal  [] Abnormal -    Neck: [x] No visualized mass [] Abnormal -     Pulmonary/Chest: [x] Respiratory effort normal   [x] No visualized signs of difficulty breathing or respiratory distress        [] Abnormal -      Musculoskeletal:   [x] Normal gait with no signs of ataxia         [x] Normal range of motion of neck        [] Abnormal -     Neurological:        [x] No Facial Asymmetry (Cranial nerve 7 motor function) (limited exam due to video visit)          [x] No gaze palsy        [] Abnormal -          Skin:        [x] No significant exanthematous lesions or discoloration noted on facial skin         [] Abnormal -            Psychiatric:       [x] Normal Affect [] Abnormal -        [x] No Hallucinations    Other pertinent observable physical exam findings:-                Antony Ha, was evaluated through a synchronous (real-time) audio-video encounter. The patient (or guardian if applicable) is aware that this is a billable service. Verbal consent to proceed has been obtained within the past 12 months.  The visit was conducted pursuant to the emergency declaration under the [facility], [address] [place] [address] authority and the [employer] General Act. Patient identification was verified, and a caregiver was present when appropriate. The patient was located in a state where the provider was credentialed to provide care. An electronic signature was used to authenticate this note.     --Vasiliy Andres MD

## 2021-12-27 NOTE — PROGRESS NOTES
Adam Ross agreed to Video Chat/Exam in presence of Dr Kathryn Paige and myself. Verified who was present in room with Adam Ross. Adam Ross informed the e-mail address used to [facility] cannot be used to contact the Provider, if they are any questions or concerns they need to call the office directly. Adam Ross stated understanding.

## 2021-12-30 ENCOUNTER — TELEPHONE (OUTPATIENT)
Dept: FAMILY MEDICINE CLINIC | Age: 71
End: 2021-12-30

## 2021-12-30 NOTE — TELEPHONE ENCOUNTER
Pt called said that he is getting better however the right ear is still plugged. He is wondering if a medicated ear drop would help?       [employer]

## 2022-01-05 ENCOUNTER — PATIENT MESSAGE (OUTPATIENT)
Dept: FAMILY MEDICINE CLINIC | Age: 72
End: 2022-01-05

## 2022-01-05 NOTE — TELEPHONE ENCOUNTER
From: Garcia Morales  To: Dr. Chikis Hdez  Sent: 1/5/2022 8:19 AM EST  Subject: Sinus / Ear Infection    After 9 days of antibiotic and atrovent spray, my right ear still hasn't cleared. It's better, but I can feel my pulse in that ear.

## 2022-01-07 NOTE — TELEPHONE ENCOUNTER
Tried to call pt no answer, sent pt my chart message. He should stop the zyrtec after today and start the claritin tomorrow for 10 days.

## 2022-01-07 NOTE — TELEPHONE ENCOUNTER
Pt called said that he takes Zyrtec every morning. Marbin Westbrook he already took Zyrtec today. Wants to know if he should take both? Also when should he start Claritin?

## 2022-01-20 ENCOUNTER — TELEPHONE (OUTPATIENT)
Dept: FAMILY MEDICINE CLINIC | Age: 72
End: 2022-01-20

## 2022-01-20 RX ORDER — MECLIZINE HYDROCHLORIDE 25 MG/1
25 TABLET ORAL EVERY 8 HOURS PRN
Qty: 30 TABLET | Refills: 0 | Status: SHIPPED | OUTPATIENT
Start: 2022-01-20

## 2022-01-20 NOTE — TELEPHONE ENCOUNTER
Date of last visit:  12/27/2021  Date of next visit:  2/17/2022    Requested Prescriptions     Signed Prescriptions Disp Refills    meclizine (ANTIVERT) 25 MG tablet 30 tablet 0     Sig: Take 1 tablet by mouth every 8 hours as needed for Dizziness     Authorizing Provider: Kandi Houser     Ordering User: Prosper Vidal

## 2022-01-20 NOTE — TELEPHONE ENCOUNTER
Pt called stating his vertigo medication is still working but not as well.     Please call pt when addressed [phone]

## 2022-02-06 DIAGNOSIS — E78.00 HYPERCHOLESTEREMIA: ICD-10-CM

## 2022-02-07 NOTE — TELEPHONE ENCOUNTER
Date of last visit:  12/27/2021  Date of next visit:  2/17/2022    Requested Prescriptions     Pending Prescriptions Disp Refills    atorvastatin (LIPITOR) 40 MG tablet [Pharmacy Med Name: Atorvastatin Calcium Oral Tablet 40 MG] 90 tablet 0     Sig: TAKE 1 TABLET BY MOUTH EVERY DAY

## 2022-02-08 RX ORDER — ATORVASTATIN CALCIUM 40 MG/1
TABLET, FILM COATED ORAL
Qty: 90 TABLET | Refills: 0 | Status: SHIPPED | OUTPATIENT
Start: 2022-02-08 | End: 2022-02-10

## 2022-02-10 DIAGNOSIS — E78.00 HYPERCHOLESTEREMIA: ICD-10-CM

## 2022-02-10 RX ORDER — ATORVASTATIN CALCIUM 40 MG/1
TABLET, FILM COATED ORAL
Qty: 90 TABLET | Refills: 0 | Status: SHIPPED | OUTPATIENT
Start: 2022-02-10 | End: 2022-06-13 | Stop reason: SDUPTHER

## 2022-02-10 NOTE — TELEPHONE ENCOUNTER
Date of last visit:  12/27/2021  Date of next visit:  2/18/2022    Requested Prescriptions     Pending Prescriptions Disp Refills    atorvastatin (LIPITOR) 40 MG tablet [Pharmacy Med Name: Atorvastatin Calcium Oral Tablet 40 MG] 90 tablet 0     Sig: TAKE 1 TABLET BY MOUTH EVERY DAY

## 2022-02-11 ENCOUNTER — HOSPITAL ENCOUNTER (OUTPATIENT)
Dept: AUDIOLOGY | Age: 72
Discharge: HOME | End: 2022-02-11

## 2022-02-11 PROCEDURE — 92592 HC HEARING AID CHECK, ONE EAR: CPT | Performed by: AUDIOLOGIST

## 2022-02-11 NOTE — PROGRESS NOTES
ACCOUNT #: [de-identified]    DIAGNOSIS: Sensorineural hearing loss of both ears. HEARING AID PROMLEM: Patient states right hearing aid is intermittent. Wants left hearing aid cleaned/checked also. Otoscopy was WNL for both ears. Listening check of hearing aids revealed normal output for both hearing aids. Replaced wax filters, domes and microphone covers on both hearing aids. Cleaned battery contacts and  connection. Will see patient annually, or sooner if problems arise. Billed $20.00 for today's clean/check.

## 2022-02-18 ENCOUNTER — OFFICE VISIT (OUTPATIENT)
Dept: FAMILY MEDICINE CLINIC | Age: 72
End: 2022-02-18

## 2022-02-18 VITALS
SYSTOLIC BLOOD PRESSURE: 130 MMHG | HEART RATE: 78 BPM | WEIGHT: 152.13 LBS | DIASTOLIC BLOOD PRESSURE: 70 MMHG | HEIGHT: 67 IN | RESPIRATION RATE: 16 BRPM | BODY MASS INDEX: 23.88 KG/M2 | TEMPERATURE: 96.8 F

## 2022-02-18 DIAGNOSIS — G47.419 PRIMARY NARCOLEPSY WITHOUT CATAPLEXY: ICD-10-CM

## 2022-02-18 DIAGNOSIS — E78.00 HYPERCHOLESTEREMIA: ICD-10-CM

## 2022-02-18 DIAGNOSIS — I25.10 ASHD (ARTERIOSCLEROTIC HEART DISEASE): ICD-10-CM

## 2022-02-18 DIAGNOSIS — K63.5 POLYP OF COLON, UNSPECIFIED PART OF COLON, UNSPECIFIED TYPE: ICD-10-CM

## 2022-02-18 DIAGNOSIS — J01.20 ACUTE NON-RECURRENT ETHMOIDAL SINUSITIS: Primary | ICD-10-CM

## 2022-02-18 DIAGNOSIS — I48.0 PAROXYSMAL ATRIAL FIBRILLATION (HCC): ICD-10-CM

## 2022-02-18 DIAGNOSIS — R06.3 CENTRAL SLEEP APNEA DUE TO CHEYNE-STOKES RESPIRATION: ICD-10-CM

## 2022-02-18 PROCEDURE — 99213 OFFICE O/P EST LOW 20 MIN: CPT | Performed by: FAMILY MEDICINE

## 2022-02-18 RX ORDER — AMOXICILLIN AND CLAVULANATE POTASSIUM 875; 125 MG/1; MG/1
1 TABLET, FILM COATED ORAL 2 TIMES DAILY
Qty: 20 TABLET | Refills: 0 | Status: SHIPPED | OUTPATIENT
Start: 2022-02-18 | End: 2022-02-28

## 2022-02-18 RX ORDER — IPRATROPIUM BROMIDE 42 UG/1
2 SPRAY, METERED NASAL 3 TIMES DAILY
Qty: 15 ML | Refills: 1 | Status: SHIPPED | OUTPATIENT
Start: 2022-02-18

## 2022-02-18 ASSESSMENT — ENCOUNTER SYMPTOMS
BLOOD IN STOOL: 0
ORTHOPNEA: 0
TROUBLE SWALLOWING: 0
NAUSEA: 0
CONSTIPATION: 0
SHORTNESS OF BREATH: 0
ABDOMINAL PAIN: 0
COUGH: 0
EYE PAIN: 0
BACK PAIN: 0
CHEST TIGHTNESS: 0
SORE THROAT: 0

## 2022-02-18 NOTE — PROGRESS NOTES
Subjective:      Patient ID: Tammie Higginbotham is a 80 y.o. male. Central  Sleep  Apnea and  noted and  On  Ritalin  For  Narcolepsy  On sanosi  Now        cpap  Noted       Sinus  Now  Better   Slight   Congestion  Still persist  Noted     Benign Prostatic Hypertrophy  This is a chronic problem. The current episode started more than 1 year ago. The problem has been resolved since onset. Irritative symptoms do not include frequency or urgency. Obstructive symptoms do not include dribbling. Pertinent negatives include no hematuria or nausea. He is sexually active. Nothing aggravates the symptoms. Past treatments include tamsulosin. Hypertension  This is a chronic problem. The current episode started more than 1 year ago. The problem has been resolved since onset. Pertinent negatives include no chest pain, headaches, orthopnea, palpitations, peripheral edema, shortness of breath or sweats. The current treatment provides significant improvement. Hyperlipidemia  This is a chronic problem. The current episode started more than 1 year ago. The problem is controlled. Recent lipid tests were reviewed and are normal. Pertinent negatives include no chest pain or shortness of breath. Current antihyperlipidemic treatment includes statins. The current treatment provides significant improvement of lipids. There are no compliance problems. Past Medical History:   Diagnosis Date    Actinic keratoses     Angina effort      Replacing deprecated diagnoses    Arthritis     hands    ASHD (arteriosclerotic heart disease)     Carotid disease, bilateral ([address] 2017    Colon polyps 5/07    Hypercholesteremia 7/97    Narcolepsy     Rotator cuff (capsule) sprain     Tobacco abuse     Unspecified sleep apnea        Review of Systems   Constitutional: Negative for fatigue and fever. HENT: Negative for congestion, ear pain, postnasal drip, sore throat and trouble swallowing. Eyes: Negative for pain.    Respiratory: Negative for cough, chest tightness and shortness of breath. Cardiovascular: Negative for chest pain, palpitations, orthopnea and leg swelling. Gastrointestinal: Negative for abdominal pain, blood in stool, constipation and nausea. Genitourinary: Negative for difficulty urinating, frequency, hematuria and urgency. Musculoskeletal: Negative for arthralgias, back pain, joint swelling and neck stiffness. Skin: Negative for rash. Neurological: Negative for dizziness, weakness and headaches. Hematological: Negative for adenopathy. Does not bruise/bleed easily. Psychiatric/Behavioral: Negative for behavioral problems, dysphoric mood and sleep disturbance. /70 (Site: Left Upper Arm, Position: Sitting, Cuff Size: Medium Adult)   Pulse 78   Temp 96.8 °F (36 °C) (Skin)   Resp 16   Ht 5' 7\" (1.702 m)   Wt 152 lb 2 oz (69 kg)   BMI 23.83 kg/m²   Objective:   Physical Exam  Vitals and nursing note reviewed. Constitutional:       Appearance: He is well-developed. HENT:      Head: Normocephalic and atraumatic. Right Ear: External ear normal.      Left Ear: External ear normal.      Nose: Nose normal.   Eyes:      Conjunctiva/sclera: Conjunctivae normal.      Pupils: Pupils are equal, round, and reactive to light. Comments: Fundi nl   Neck:      Thyroid: No thyromegaly. Cardiovascular:      Rate and Rhythm: Normal rate and regular rhythm. Heart sounds: Normal heart sounds. Pulmonary:      Effort: Pulmonary effort is normal.      Breath sounds: Normal breath sounds. No wheezing or rales. Abdominal:      General: Bowel sounds are normal.      Palpations: Abdomen is soft. There is no mass. Tenderness: There is no abdominal tenderness. Musculoskeletal:         General: Normal range of motion. Cervical back: Normal range of motion and neck supple. Lymphadenopathy:      Cervical: No cervical adenopathy. Skin:     General: Skin is warm and dry.       Findings: No rash.   Neurological:      Mental Status: He is alert and oriented to person, place, and time. Cranial Nerves: No cranial nerve deficit. Deep Tendon Reflexes: Reflexes are normal and symmetric. Assessment:       Diagnosis Orders   1. Acute non-recurrent ethmoidal sinusitis  amoxicillin-clavulanate (AUGMENTIN) 875-125 MG per tablet    ipratropium (ATROVENT) 0.06 % nasal spray   2. Polyp of colon, unspecified part of colon, unspecified type     3. Hypercholesteremia     4. ASHD (arteriosclerotic heart disease)     5. Central sleep apnea due to Cheyne-Santos respiration     6. 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 Paroxysmal atrial fibrillation (HCC)           Plan:      Current Outpatient Medications   Medication Sig Dispense Refill    amoxicillin-clavulanate (AUGMENTIN) 875-125 MG per tablet Take 1 tablet by mouth 2 times daily for 10 days 20 tablet 0    ipratropium (ATROVENT) 0.06 % nasal spray 2 sprays by Each Nostril route 3 times daily 15 mL 1    atorvastatin (LIPITOR) 40 MG tablet TAKE 1 TABLET BY MOUTH EVERY DAY 90 tablet 0    meclizine (ANTIVERT) 25 MG tablet Take 1 tablet by mouth every 8 hours as needed for Dizziness 30 tablet 0    dilTIAZem (CARDIZEM CD) 120 MG extended release capsule Take 1 capsule by mouth daily 90 capsule 3    Solriamfetol HCl (SUNOSI) 150 MG TABS Take 150 mg by mouth daily Patient to take 75 mg PO daily for first 5 days then increase to 150 mg PO daily 90 tablet 0    silodosin (RAPAFLO) 8 MG CAPS Take 1 capsule by mouth every evening 30 capsule 11    apixaban (ELIQUIS) 5 MG TABS tablet Take 1 tablet by mouth 2 times daily 180 tablet 3    ketoconazole (NIZORAL) 2 % cream Apply topically daily. Am and  pm 30 g 1    hydrocortisone 1 % cream Apply topically 2 times daily Apply topically 2 times daily.       ibuprofen (ADVIL;MOTRIN) 200 MG tablet Take 200 mg by mouth every 6 hours as needed for Pain      fluocinonide (LIDEX) 0.05 % gel Apply topically 2 times daily. 15 g 1    Multiple Vitamins-Minerals (ICAPS AREDS 2 PO) Take 1 capsule by mouth 2 times daily      Calcium Carbonate-Vitamin D (CALCIUM 500/VITAMIN D PO) Take 1 tablet by mouth daily.  aspirin 81 MG chewable tablet Take 81 mg by mouth daily. No current facility-administered medications for this visit. No orders of the defined types were placed in this encounter.        Off ritalin and  ?  atrail  Fib   Lorena Glover MD

## 2022-02-18 NOTE — PROGRESS NOTES
No components found for: CHLPL  Lab Results   Component Value Date    TRIG 73 12/02/2021     Lab Results   Component Value Date    HDL 51 12/02/2021     Lab Results   Component Value Date    LDLCALC 98 12/02/2021     Lab Results   Component Value Date    LABVLDL 15 12/02/2021       Lab Results   Component Value Date    ALT 18 12/02/2021    AST 16 12/02/2021    ALKPHOS 89 12/02/2021    BILITOT 1.3 (H) 12/02/2021           Is patient currently taking any cholesterol medications? Yes   If yes, see med list as above    Is the patient reporting any side effects of cholesterol medications? No    Is the patient taking any over the counter medications? Yes   If yes, see med list as above    Is the patient taking a daily aspirin? Yes      Patient Self-Management Goal for Chronic Condition  Goal: I will take all medications as prescribed by my doctor, and I will call the office if I am having any medication problems. Barriers to success: none  Plan for overcoming my barriers: N/A     Confidence: 10/10  Date goal set: 2/18/22  Date goal attained:     Have you seen any other physician or provider since your last visit no    Have you had any other diagnostic tests since your last visit? no    Have you changed or stopped any medications since your last visit including any over-the-counter medicines, vitamins, or herbal medicines? no     Are you taking all your prescribed medications?  Yes    If NO, why?

## 2022-02-28 NOTE — TELEPHONE ENCOUNTER
Date of last visit:  7/16/2020  Date of next visit:  12/14/2020    Requested Prescriptions     Signed Prescriptions Disp Refills    atorvastatin (LIPITOR) 40 MG tablet 90 tablet 0     Sig: TAKE 1 TABLET BY MOUTH ONE TIME A DAY     Authorizing Provider: Nehemias Gunter     Ordering User: Nader STEEN [facility] [address] Minimal (Score 0-3)

## 2022-04-05 ENCOUNTER — TELEPHONE (OUTPATIENT)
Dept: FAMILY MEDICINE CLINIC | Age: 72
End: 2022-04-05

## 2022-04-05 DIAGNOSIS — B96.89 ACUTE BACTERIAL SINUSITIS: Primary | ICD-10-CM

## 2022-04-05 DIAGNOSIS — J01.90 ACUTE BACTERIAL SINUSITIS: Primary | ICD-10-CM

## 2022-04-05 RX ORDER — AZITHROMYCIN 250 MG/1
TABLET, FILM COATED ORAL
Qty: 1 PACKET | Refills: 0 | Status: SHIPPED | OUTPATIENT
Start: 2022-04-05 | End: 2022-04-21

## 2022-04-05 NOTE — TELEPHONE ENCOUNTER
Pt called with slight chest congestion and cough. Unable to bring anything up out of chest per pt.     [facility]

## 2022-04-05 NOTE — TELEPHONE ENCOUNTER
Date of last visit:  2/18/2022  Date of next visit:  8/19/2022    Requested Prescriptions     Signed Prescriptions Disp Refills    azithromycin (ZITHROMAX) 250 MG tablet 1 packet 0     Sig: Take 2 tabs (500 mg) on Day 1, and take 1 tab (250 mg) on days 2 through 5.      Authorizing Provider: Abhinav Ovalles     Ordering User: Rakesh Dukes

## 2022-04-19 ENCOUNTER — OFFICE VISIT (OUTPATIENT)
Dept: PULMONOLOGY | Age: 72
End: 2022-04-19
Payer: COMMERCIAL

## 2022-04-19 VITALS
OXYGEN SATURATION: 99 % | HEART RATE: 63 BPM | BODY MASS INDEX: 22.76 KG/M2 | SYSTOLIC BLOOD PRESSURE: 128 MMHG | HEIGHT: 67 IN | WEIGHT: 145 LBS | DIASTOLIC BLOOD PRESSURE: 72 MMHG | TEMPERATURE: 98 F

## 2022-04-19 DIAGNOSIS — G47.31 COMPLEX SLEEP APNEA SYNDROME: Primary | ICD-10-CM

## 2022-04-19 DIAGNOSIS — G47.10 HYPERSOMNIA: ICD-10-CM

## 2022-04-19 DIAGNOSIS — R40.0 DAYTIME SLEEPINESS: ICD-10-CM

## 2022-04-19 DIAGNOSIS — R41.89 BRAIN FOG: ICD-10-CM

## 2022-04-19 PROCEDURE — 99214 OFFICE O/P EST MOD 30 MIN: CPT | Performed by: PHYSICIAN ASSISTANT

## 2022-04-19 RX ORDER — METHYLPHENIDATE HYDROCHLORIDE 20 MG/1
20 TABLET ORAL 2 TIMES DAILY
COMMUNITY
End: 2022-04-19 | Stop reason: SDUPTHER

## 2022-04-19 RX ORDER — METHYLPHENIDATE HYDROCHLORIDE 20 MG/1
20 TABLET ORAL 2 TIMES DAILY
Qty: 60 TABLET | Refills: 0 | Status: SHIPPED | OUTPATIENT
Start: 2022-04-19 | End: 2022-05-10 | Stop reason: SDUPTHER

## 2022-04-19 ASSESSMENT — ENCOUNTER SYMPTOMS
WHEEZING: 0
NAUSEA: 0
BACK PAIN: 0
SHORTNESS OF BREATH: 0
ALLERGIC/IMMUNOLOGIC NEGATIVE: 1
STRIDOR: 0
DIARRHEA: 0
CHEST TIGHTNESS: 0
EYES NEGATIVE: 1
COUGH: 0

## 2022-04-19 NOTE — PROGRESS NOTES
Gastrointestinal: Negative for diarrhea and nausea. Endocrine: Negative. Genitourinary: Negative. Musculoskeletal: Negative. Negative for arthralgias and back pain. Skin: Negative. Allergic/Immunologic: Negative. Neurological: Negative. Negative for dizziness and light-headedness. Psychiatric/Behavioral: Negative. All other systems reviewed and are negative. Physical Exam:    BMI:  Body mass index is 22.71 kg/m². Wt Readings from Last 3 Encounters:   04/19/22 145 lb (65.8 kg)   02/18/22 152 lb 2 oz (69 kg)   12/21/21 155 lb (70.3 kg)     Weight stable / unchanged  Vitals: /72 (Site: Left Upper Arm, Position: Sitting, Cuff Size: Large Adult)   Pulse 63   Temp 98 °F (36.7 °C) (Temporal)   Ht 5' 7\" (1.702 m)   Wt 145 lb (65.8 kg)   SpO2 99%   BMI 22.71 kg/m²       Physical Exam  Constitutional:       Appearance: Normal appearance. He is normal weight. HENT:      Head: Normocephalic and atraumatic. Right Ear: External ear normal.      Left Ear: External ear normal.      Nose: Nose normal.   Eyes:      Extraocular Movements: Extraocular movements intact. Conjunctiva/sclera: Conjunctivae normal.      Pupils: Pupils are equal, round, and reactive to light. Pulmonary:      Effort: Pulmonary effort is normal.   Musculoskeletal:      Cervical back: Normal range of motion and neck supple. Neurological:      General: No focal deficit present. Mental Status: He is alert and oriented to person, place, and time. Psychiatric:         Attention and Perception: Attention and perception normal.         Mood and Affect: Mood and affect normal.         Speech: Speech normal.         Behavior: Behavior normal. Behavior is cooperative. Thought Content: Thought content normal.         Cognition and Memory: Cognition normal.         Judgment: Judgment normal.           ASSESSMENT/DIAGNOSIS     Diagnosis Orders   1. Complex sleep apnea syndrome     2.  Daytime sleepiness     3. Brain fog     4. Hypersomnia              Plan   Do you need any equipment today? No  - He continues to have brain fog, not tired  - Will use Ritalin for breakthrough focus  - will see if can get MSLT results from 2000  - this in not typical narcolepsy or hypersomnia- more focus, brain fog  - Download reviewed and discussed with patient  - He  was advised to continue current positive airway pressure therapy with above described pressure. - He  advised to keep good compliance with current recommended pressure to get optimal results and clinical improvement  - Recommend 7-9 hours of sleep with PAP  - He was advised to call "[employer] regarding supplies if needed.   -He call my office for earlier appointment if needed for worsening of sleep symptoms.   - He was instructed on weight loss  - Juan Carlos Briggs was educated about my impression and plan. Patient verbalizesunderstanding.   We will see Moe Bush back in: 2 months with download    Information added by my medical assistant/LPN was reviewed today       Josey Elliott PA-C, MPAS  4/19/2022

## 2022-04-20 PROBLEM — I47.10 SVT (SUPRAVENTRICULAR TACHYCARDIA): Status: ACTIVE | Noted: 2022-04-20

## 2022-04-20 PROBLEM — I47.1 SVT (SUPRAVENTRICULAR TACHYCARDIA) (HCC): Status: ACTIVE | Noted: 2022-04-20

## 2022-04-20 PROBLEM — I48.0 PAROXYSMAL ATRIAL FIBRILLATION (HCC): Status: ACTIVE | Noted: 2022-04-20

## 2022-04-20 NOTE — PROGRESS NOTES
[facility] PROFESSIONAL SERVICES  [facility]   [address] [place] [address]   [address] [address]   Dept: [phone]   Dept Fax: [phone]   Loc: [phone]      Chief Complaint   Patient presents with    Follow-up     6 month fu    Coronary Artery Disease     Cardiologist:  Dr. Moe Guillermo  81 yo male presents for 6 month f/u. Hx of CAD, SVT, possible Afib, HTN, TIA, vertigo. No chest pain, sob still there some, occasional, about the same as 6 months. No dizziness, has been feeling okay. Switched from BB to cardizem d/t to back pain. Wants to know whether he really needs cardizem as he doesn't feel any different since taking it. Wants to know further details about his event monitor results: how often SVT, what time of day, patient triggered events.       General:   No fever, no chills, no weight loss, + fatigue  Pulmonary:    + dyspnea, no wheezing  Cardiac:    Denies recent chest pain   GI:     No nausea or vomiting, no abdominal pain  Neuro:      No dizziness or light headedness  Musculoskeletal:  No recent active issues  Extremities:   No edema      Past Medical History:   Diagnosis Date    Actinic keratoses     Angina effort      Replacing deprecated diagnoses    Arthritis     hands    ASHD (arteriosclerotic heart disease)     Carotid disease, bilateral ([facility] [address] 2017    Colon polyps 05/2007    Hypercholesteremia 07/1997    Narcolepsy     Rotator cuff (capsule) sprain     Tobacco abuse     Unspecified sleep apnea     cpap       Allergies   Allergen Reactions    Adderall [Amphetamine-Dextroamphetamine] Shortness Of Breath    Flomax [Tamsulosin] Other (See Comments)     Back pain     Metoprolol Other (See Comments)     Lower back pain    Other Other (See Comments)     brilenta-low back pain     Polysporin [Bacitracin-Polymyxin B] Hives    Provigil [Modafinil] Other (See Comments)     Low back pain        Current Outpatient Medications   Medication Sig Dispense Refill    loratadine (CLARITIN) 10 MG capsule Take 10 mg by mouth daily      methylphenidate (RITALIN) 20 MG tablet Take 1 tablet by mouth 2 times daily for 30 days. 60 tablet 0    ipratropium (ATROVENT) 0.06 % nasal spray 2 sprays by Each Nostril route 3 times daily 15 mL 1    atorvastatin (LIPITOR) 40 MG tablet TAKE 1 TABLET BY MOUTH EVERY DAY 90 tablet 0    meclizine (ANTIVERT) 25 MG tablet Take 1 tablet by mouth every 8 hours as needed for Dizziness 30 tablet 0    dilTIAZem (CARDIZEM CD) 120 MG extended release capsule Take 1 capsule by mouth daily 90 capsule 3    Solriamfetol HCl (SUNOSI) 150 MG TABS Take 150 mg by mouth daily Patient to take 75 mg PO daily for first 5 days then increase to 150 mg PO daily (Patient taking differently: Take 150 mg by mouth daily ) 90 tablet 0    silodosin (RAPAFLO) 8 MG CAPS Take 1 capsule by mouth every evening 30 capsule 11    apixaban (ELIQUIS) 5 MG TABS tablet Take 1 tablet by mouth 2 times daily 180 tablet 3    ketoconazole (NIZORAL) 2 % cream Apply topically daily. Am and  pm 30 g 1    hydrocortisone 1 % cream Apply topically 2 times daily Apply topically 2 times daily.  ibuprofen (ADVIL;MOTRIN) 200 MG tablet Take 200 mg by mouth every 6 hours as needed for Pain      fluocinonide (LIDEX) 0.05 % gel Apply topically 2 times daily. 15 g 1    Multiple Vitamins-Minerals (ICAPS AREDS 2 PO) Take 1 capsule by mouth 2 times daily      Calcium Carbonate-Vitamin D (CALCIUM 500/VITAMIN D PO) Take 1 tablet by mouth daily.  aspirin 81 MG chewable tablet Take 81 mg by mouth daily. No current facility-administered medications for this visit.        Social History     Socioeconomic History    Marital status:      Spouse name: None    Number of children: None    Years of education: None    Highest education level: None   Occupational History    None   Tobacco Use    Smoking status: Former Smoker     Packs/day: 1.00     Years: 45.00     Pack years: 45.00 Types: Cigarettes     Quit date: 2005     Years since quittin.8    Smokeless tobacco: Never Used   Substance and Sexual Activity    Alcohol use: Yes     Alcohol/week: 8.0 standard drinks     Types: 8 Cans of beer per week     Comment: social    Drug use: No    Sexual activity: Yes     Partners: Female   Other Topics Concern    None   Social History Narrative    None     Social Determinants of Health     Financial Resource Strain: Low Risk     Difficulty of Paying Living Expenses: Not hard at all   Food Insecurity: No Food Insecurity    Worried About Running Out of Food in the Last Year: Never true    [address] in the Last Year: Never true   Transportation Needs:     Lack of Transportation (Medical): Not on file    Lack of Transportation (Non-Medical): Not on file   Physical Activity:     Days of Exercise per Week: Not on file    Minutes of Exercise per Session: Not on file   Stress:     Feeling of Stress : Not on file   Social Connections:     Frequency of Communication with Friends and Family: Not on file    Frequency of Social Gatherings with Friends and Family: Not on file    Attends Yarsani Services: Not on file    Active Member of [address] [place] [address] or Organizations: Not on file    Attends Club or Organization Meetings: Not on file    Marital Status: Not on file   Intimate Partner Violence:     Fear of Current or Ex-Partner: Not on file    Emotionally Abused: Not on file    Physically Abused: Not on file    Sexually Abused: Not on file   Housing Stability:     Unable to Pay for Housing in the Last Year: Not on file    Number of [place] in the Last Year: Not on file    Unstable Housing in the Last Year: Not on file       Family History   Problem Relation Age of Onset    Stroke Mother     Diabetes Mother     Heart Disease Father     Asthma Father        Blood pressure (!) 141/75, pulse 88, height 5' 7\" (1.702 m), weight 144 lb 12.8 oz (65.7 kg).     General:   Well developed, well nourished  Lungs:   Clear to auscultation  Heart:    Normal S1 S2, No murmur, rubs, or gallops  Abdomen:   Soft, non tender, no organomegalies, positive bowel sounds  Extremities:   No edema, no cyanosis, good peripheral pulses  Neurological:   Awake, alert, oriented. No obvious focal deficits  Musculoskeletal:  No obvious deformities    EKG:          Diagnosis Orders   1. ASHD (arteriosclerotic heart disease)     2. Paroxysmal atrial fibrillation (HCC)     3. SVT (supraventricular tachycardia) (HCC)         No orders of the defined types were placed in this encounter. Assessment/Plan:   Hx of SVT, possible afib- on eliquis for DVT, covered for possible afib. Patient is asymptomatic currently. Doing okay with cardizem, wants to know about stopping. Would like more info about event results prior. Could consider repeat event monitor for further clarification. Will discuss with Dr Neda Paige for further recommendations. Continuing cardizem for now. Disposition:   F/u with Dr Neda Paige in 6 months.    Continue Dr Tamra Zavala current treatment plan  Follow up with Dr Bekah Ward as scheduled or sooner if needed

## 2022-04-21 ENCOUNTER — OFFICE VISIT (OUTPATIENT)
Dept: CARDIOLOGY CLINIC | Age: 72
End: 2022-04-21
Payer: COMMERCIAL

## 2022-04-21 ENCOUNTER — TELEPHONE (OUTPATIENT)
Dept: CARDIOLOGY CLINIC | Age: 72
End: 2022-04-21

## 2022-04-21 VITALS
SYSTOLIC BLOOD PRESSURE: 141 MMHG | HEIGHT: 67 IN | WEIGHT: 144.8 LBS | HEART RATE: 88 BPM | BODY MASS INDEX: 22.73 KG/M2 | DIASTOLIC BLOOD PRESSURE: 75 MMHG

## 2022-04-21 DIAGNOSIS — I25.10 ASHD (ARTERIOSCLEROTIC HEART DISEASE): Primary | ICD-10-CM

## 2022-04-21 DIAGNOSIS — I47.1 SVT (SUPRAVENTRICULAR TACHYCARDIA) (HCC): ICD-10-CM

## 2022-04-21 PROCEDURE — 99213 OFFICE O/P EST LOW 20 MIN: CPT | Performed by: STUDENT IN AN ORGANIZED HEALTH CARE EDUCATION/TRAINING PROGRAM

## 2022-04-21 RX ORDER — LORATADINE 10 MG/1
10 CAPSULE, LIQUID FILLED ORAL DAILY
COMMUNITY

## 2022-05-10 ENCOUNTER — PATIENT MESSAGE (OUTPATIENT)
Dept: PULMONOLOGY | Age: 72
End: 2022-05-10

## 2022-05-10 DIAGNOSIS — G47.31 COMPLEX SLEEP APNEA SYNDROME: ICD-10-CM

## 2022-05-10 DIAGNOSIS — G47.10 HYPERSOMNIA: ICD-10-CM

## 2022-05-10 DIAGNOSIS — R40.0 DAYTIME SLEEPINESS: ICD-10-CM

## 2022-05-10 DIAGNOSIS — R41.89 BRAIN FOG: ICD-10-CM

## 2022-05-10 RX ORDER — METHYLPHENIDATE HYDROCHLORIDE 20 MG/1
20 TABLET ORAL 2 TIMES DAILY
Qty: 60 TABLET | Refills: 0 | Status: SHIPPED | OUTPATIENT
Start: 2022-05-10 | End: 2022-05-23 | Stop reason: SDUPTHER

## 2022-05-10 RX ORDER — SOLRIAMFETOL 150 MG/1
150 TABLET, FILM COATED ORAL DAILY
Qty: 30 TABLET | Refills: 5 | Status: SHIPPED | OUTPATIENT
Start: 2022-05-10 | End: 2022-05-23 | Stop reason: SDUPTHER

## 2022-05-10 NOTE — TELEPHONE ENCOUNTER
From: Katlin Mendes  To: Alejandro Ching  Sent: 5/10/2022 3:03 PM EDT  Subject: Medications    The Sunosi and Methylphenidate combination is working OK. I need new prescriptions for both medications sent to [facility]. 90 day supply, please.

## 2022-05-23 RX ORDER — SOLRIAMFETOL 150 MG/1
150 TABLET, FILM COATED ORAL DAILY
Qty: 90 TABLET | Refills: 1 | Status: SHIPPED | OUTPATIENT
Start: 2022-05-23

## 2022-05-23 RX ORDER — METHYLPHENIDATE HYDROCHLORIDE 20 MG/1
20 TABLET ORAL 2 TIMES DAILY
Qty: 180 TABLET | Refills: 0 | Status: SHIPPED | OUTPATIENT
Start: 2022-05-23 | End: 2022-06-22

## 2022-06-13 ENCOUNTER — PATIENT MESSAGE (OUTPATIENT)
Dept: FAMILY MEDICINE CLINIC | Age: 72
End: 2022-06-13

## 2022-06-13 DIAGNOSIS — E78.00 HYPERCHOLESTEREMIA: ICD-10-CM

## 2022-06-13 NOTE — TELEPHONE ENCOUNTER
From: Deo Haro  To: Dr. Phu Meza  Sent: 6/13/2022 11:31 AM EDT  Subject: New atorvastatin prescription    Please send a prescription for 40mg atorvastatin to [facility]. 90-day supply per fill.     Violeta Chain, 1950

## 2022-06-14 RX ORDER — ATORVASTATIN CALCIUM 40 MG/1
TABLET, FILM COATED ORAL
Qty: 90 TABLET | Refills: 1 | Status: SHIPPED | OUTPATIENT
Start: 2022-06-14

## 2022-06-17 NOTE — PROGRESS NOTES
[facility]      Ana Maria Abraham         [id number]  6/20/2022   Chief Complaint   Patient presents with    Follow-up     JAY narcolepsy Med check 3 mo f/u w [facility] tami        Pt of Dr. Hayder Abrams    PAP Download:   Original or initial AHI: 14     Date of initial study: 12/27/2000      Compliant  97%     Noncompliant 3 %     PAP Type Aircurve 10 Level  5/15/3   Avg Hrs/Day 6 hrs 42 mins   AHI: 0.2   Recorded compliance dates , 5/18/22-6/16/22  Machine/Mfg:   [x] [employer]Med    [] Respironics/Dreamstation   Interface:   [x] Nasal    [] Nasal pillows   [] FFM       Provider:      [] [facility]     [][employer]     [] [employer]    [x] [employer] [place]    [] [employer]               [] [employer]      [] [employer]    [] [address]      [] Other    Neck Size: 13.75  Mallampati Mallampati 3  ESS: 6  SAQLI: 85    Here is a scan of the most recent download:            Presentation:   Josef Cooper presents for sleep medicine follow up for complex sleep apnea  Since the last visit, Josef Cooper is doing well with PAP. He is getting benefit from [facility] and Ritalin. Still having some brain fog. He had SOB with Adderall, back pain with Provigil and Nuvigil. Equipment issues: The pressure is  acceptable, the mask is acceptable     Sleep issues:  Do you feel better? Yes and No  More rested? Yes   Better concentration? sometimes    Progress History:   Since last visit any new medical issues? No  New ER or hospital visits? No  Any new or changes in medicines? No  Any new sleep medicines? No    Review of Systems -   Review of Systems   Constitutional: Negative for activity change, appetite change, chills and fever. HENT: Negative for congestion and postnasal drip. Eyes: Negative. Respiratory: Negative for cough, chest tightness, shortness of breath, wheezing and stridor. Cardiovascular: Negative for chest pain and leg swelling. Gastrointestinal: Negative for diarrhea and nausea. Endocrine: Negative. Genitourinary: Negative. Musculoskeletal: Negative. Negative for arthralgias and back pain. Skin: Negative. Allergic/Immunologic: Negative. Neurological: Negative. Negative for dizziness and light-headedness. Psychiatric/Behavioral: Negative. All other systems reviewed and are negative. Physical Exam:    BMI:  Body mass index is 22.46 kg/m². Wt Readings from Last 3 Encounters:   06/20/22 143 lb 6.4 oz (65 kg)   04/21/22 144 lb 12.8 oz (65.7 kg)   04/19/22 145 lb (65.8 kg)     Weight stable / unchanged  Vitals: /78 (Site: Left Upper Arm, Position: Sitting, Cuff Size: Medium Adult)   Pulse 94   Temp 98.1 °F (36.7 °C) (Temporal)   Ht 5' 7\" (1.702 m)   Wt 143 lb 6.4 oz (65 kg)   SpO2 100% Comment: on RA  BMI 22.46 kg/m²       Physical Exam  Constitutional:       Appearance: Normal appearance. He is normal weight. HENT:      Head: Normocephalic and atraumatic. Right Ear: External ear normal.      Left Ear: External ear normal.      Nose: Nose normal.   Eyes:      Extraocular Movements: Extraocular movements intact. Conjunctiva/sclera: Conjunctivae normal.      Pupils: Pupils are equal, round, and reactive to light. Pulmonary:      Effort: Pulmonary effort is normal.   Musculoskeletal:      Cervical back: Normal range of motion and neck supple. Neurological:      General: No focal deficit present. Mental Status: He is alert and oriented to person, place, and time. Psychiatric:         Attention and Perception: Attention and perception normal.         Mood and Affect: Mood and affect normal.         Speech: Speech normal.         Behavior: Behavior normal. Behavior is cooperative. Thought Content: Thought content normal.         Cognition and Memory: Cognition normal.         Judgment: Judgment normal.         ASSESSMENT/DIAGNOSIS     Diagnosis Orders   1. Complex sleep apnea syndrome     2. Hypersomnia     3.  Brain fog              Plan   Do you need any equipment today? No  - Will increase Ritalin to TID with Sunosi to see if improves brain fog  - Will consider extended release Ritalin  - Download reviewed and discussed with patient  - He  was advised to continue current positive airway pressure therapy with above described pressure. - He  advised to keep good compliance with current recommended pressure to get optimal results and clinical improvement  - Recommend 7-9 hours of sleep with PAP  - He was advised to call [employer] regarding supplies if needed.   -He call my office for earlier appointment if needed for worsening of sleep symptoms.   - He was instructed on weight loss  - Domenica Kaminski was educated about my impression and plan. Patient verbalizesunderstanding.   We will see Ekaterina Maldonado back in: 3 months with download    Information added by my medical assistant/LPN was reviewed today         Howard Reyes PA-C, MENDY  6/20/2022

## 2022-06-20 ENCOUNTER — OFFICE VISIT (OUTPATIENT)
Dept: PULMONOLOGY | Age: 72
End: 2022-06-20
Payer: COMMERCIAL

## 2022-06-20 VITALS
HEIGHT: 67 IN | BODY MASS INDEX: 22.51 KG/M2 | TEMPERATURE: 98.1 F | DIASTOLIC BLOOD PRESSURE: 78 MMHG | SYSTOLIC BLOOD PRESSURE: 124 MMHG | OXYGEN SATURATION: 100 % | HEART RATE: 94 BPM | WEIGHT: 143.4 LBS

## 2022-06-20 DIAGNOSIS — R41.89 BRAIN FOG: ICD-10-CM

## 2022-06-20 DIAGNOSIS — G47.31 COMPLEX SLEEP APNEA SYNDROME: Primary | ICD-10-CM

## 2022-06-20 DIAGNOSIS — G47.10 HYPERSOMNIA: ICD-10-CM

## 2022-06-20 PROCEDURE — 1036F TOBACCO NON-USER: CPT | Performed by: PHYSICIAN ASSISTANT

## 2022-06-20 PROCEDURE — G8420 CALC BMI NORM PARAMETERS: HCPCS | Performed by: PHYSICIAN ASSISTANT

## 2022-06-20 PROCEDURE — G8427 DOCREV CUR MEDS BY ELIG CLIN: HCPCS | Performed by: PHYSICIAN ASSISTANT

## 2022-06-20 PROCEDURE — 3017F COLORECTAL CA SCREEN DOC REV: CPT | Performed by: PHYSICIAN ASSISTANT

## 2022-06-20 PROCEDURE — 99214 OFFICE O/P EST MOD 30 MIN: CPT | Performed by: PHYSICIAN ASSISTANT

## 2022-06-20 PROCEDURE — 1123F ACP DISCUSS/DSCN MKR DOCD: CPT | Performed by: PHYSICIAN ASSISTANT

## 2022-06-20 ASSESSMENT — ENCOUNTER SYMPTOMS
COUGH: 0
DIARRHEA: 0
NAUSEA: 0
SHORTNESS OF BREATH: 0
BACK PAIN: 0
STRIDOR: 0
CHEST TIGHTNESS: 0
ALLERGIC/IMMUNOLOGIC NEGATIVE: 1
EYES NEGATIVE: 1
WHEEZING: 0

## 2022-08-11 ENCOUNTER — OFFICE VISIT (OUTPATIENT)
Dept: FAMILY MEDICINE CLINIC | Age: 72
End: 2022-08-11

## 2022-08-11 VITALS
DIASTOLIC BLOOD PRESSURE: 66 MMHG | RESPIRATION RATE: 12 BRPM | HEART RATE: 64 BPM | HEIGHT: 67 IN | SYSTOLIC BLOOD PRESSURE: 104 MMHG | BODY MASS INDEX: 22.21 KG/M2 | WEIGHT: 141.5 LBS

## 2022-08-11 DIAGNOSIS — R06.3 CENTRAL SLEEP APNEA DUE TO CHEYNE-STOKES RESPIRATION: ICD-10-CM

## 2022-08-11 DIAGNOSIS — I48.0 PAROXYSMAL ATRIAL FIBRILLATION (HCC): Primary | ICD-10-CM

## 2022-08-11 DIAGNOSIS — L43.9 LICHEN PLANUS: ICD-10-CM

## 2022-08-11 DIAGNOSIS — I25.10 ASHD (ARTERIOSCLEROTIC HEART DISEASE): ICD-10-CM

## 2022-08-11 DIAGNOSIS — N40.0 BENIGN PROSTATIC HYPERPLASIA WITHOUT LOWER URINARY TRACT SYMPTOMS: ICD-10-CM

## 2022-08-11 DIAGNOSIS — E78.00 HYPERCHOLESTEREMIA: ICD-10-CM

## 2022-08-11 PROCEDURE — 99213 OFFICE O/P EST LOW 20 MIN: CPT | Performed by: FAMILY MEDICINE

## 2022-08-11 PROCEDURE — 1123F ACP DISCUSS/DSCN MKR DOCD: CPT | Performed by: FAMILY MEDICINE

## 2022-08-11 RX ORDER — FLUOCINONIDE GEL 0.5 MG/G
GEL TOPICAL
Qty: 15 G | Refills: 1 | Status: SHIPPED | OUTPATIENT
Start: 2022-08-11

## 2022-08-11 ASSESSMENT — ENCOUNTER SYMPTOMS
HEARTBURN: 0
CHEST TIGHTNESS: 0
CONSTIPATION: 0
NAUSEA: 0
ABDOMINAL PAIN: 0
EYE PAIN: 0
SHORTNESS OF BREATH: 0
SORE THROAT: 0
BLOOD IN STOOL: 0
TROUBLE SWALLOWING: 0
BACK PAIN: 0
COUGH: 0

## 2022-08-11 ASSESSMENT — PATIENT HEALTH QUESTIONNAIRE - PHQ9
SUM OF ALL RESPONSES TO PHQ9 QUESTIONS 1 & 2: 0
2. FEELING DOWN, DEPRESSED OR HOPELESS: 0
SUM OF ALL RESPONSES TO PHQ QUESTIONS 1-9: 0
SUM OF ALL RESPONSES TO PHQ QUESTIONS 1-9: 0
1. LITTLE INTEREST OR PLEASURE IN DOING THINGS: 0
SUM OF ALL RESPONSES TO PHQ QUESTIONS 1-9: 0
SUM OF ALL RESPONSES TO PHQ QUESTIONS 1-9: 0

## 2022-08-11 NOTE — PROGRESS NOTES
Subjective:      Patient ID: Freddie Briggs is a 80 y.o. male. Psvt   stable an  atrial  fib  stable   on   elquis      Narcolepsy   sunosi and  ? Issue  of  focus       Sleep apnea  noted   ? And  try   wean  off  the  adderall         Gastroesophageal Reflux  He reports no abdominal pain, no chest pain, no coughing, no heartburn, no nausea or no sore throat. This is a chronic problem. The current episode started more than 1 year ago. The problem occurs rarely. The problem has been resolved. The symptoms are aggravated by certain foods. Pertinent negatives include no fatigue, melena, muscle weakness or orthopnea. He has tried a PPI for the symptoms. The treatment provided significant relief. Hyperlipidemia  This is a chronic problem. The current episode started more than 1 year ago. The problem is resistant. Pertinent negatives include no chest pain or shortness of breath. Past Medical History:   Diagnosis Date    Actinic keratoses     Angina effort      Replacing deprecated diagnoses    Arthritis     hands    ASHD (arteriosclerotic heart disease)     Carotid disease, bilateral ([address] 2017    Colon polyps 05/2007    Hypercholesteremia 07/1997    Narcolepsy     Rotator cuff (capsule) sprain     Tobacco abuse     Unspecified sleep apnea     cpap      Review of Systems   Constitutional:  Negative for fatigue and fever. HENT:  Negative for congestion, ear pain, postnasal drip, sore throat and trouble swallowing. Eyes:  Negative for pain. Respiratory:  Negative for cough, chest tightness and shortness of breath. Cardiovascular:  Negative for chest pain, palpitations and leg swelling. Gastrointestinal:  Negative for abdominal pain, blood in stool, constipation, heartburn, melena and nausea. Genitourinary:  Negative for difficulty urinating, frequency and urgency. Musculoskeletal:  Negative for arthralgias, back pain, joint swelling, muscle weakness and neck stiffness.    Skin:  Negative for rash.   Neurological:  Negative for dizziness, weakness and headaches. Hematological:  Negative for adenopathy. Does not bruise/bleed easily. Psychiatric/Behavioral:  Negative for behavioral problems, dysphoric mood and sleep disturbance. /66 (Site: Right Upper Arm, Position: Sitting, Cuff Size: Medium Adult)   Pulse 64   Resp 12   Ht 5' 7\" (1.702 m)   Wt 141 lb 8 oz (64.2 kg)   BMI 22.16 kg/m²    Objective:   Physical Exam  Vitals and nursing note reviewed. Constitutional:       Appearance: He is well-developed. HENT:      Head: Normocephalic and atraumatic. Right Ear: External ear normal.      Left Ear: External ear normal.      Nose: Nose normal.   Eyes:      Conjunctiva/sclera: Conjunctivae normal.      Pupils: Pupils are equal, round, and reactive to light. Comments: Fundi nl   Neck:      Thyroid: No thyromegaly. Cardiovascular:      Rate and Rhythm: Normal rate and regular rhythm. Heart sounds: Normal heart sounds. Pulmonary:      Effort: Pulmonary effort is normal.      Breath sounds: Normal breath sounds. No wheezing or rales. Abdominal:      General: Bowel sounds are normal.      Palpations: Abdomen is soft. There is no mass. Tenderness: There is no abdominal tenderness. Musculoskeletal:         General: Normal range of motion. Cervical back: Normal range of motion and neck supple. Lymphadenopathy:      Cervical: No cervical adenopathy. Skin:     General: Skin is warm and dry. Findings: No rash. Neurological:      Mental Status: He is alert and oriented to person, place, and time. Cranial Nerves: No cranial nerve deficit. Deep Tendon Reflexes: Reflexes are normal and symmetric. Assessment:        ICD-10-CM    1. Paroxysmal atrial fibrillation (HCC)  I48.0       2. Lichen planus  U50.5       3. Hypercholesteremia  E78.00       4. ASHD (arteriosclerotic heart disease)  I25.10       5.  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 15 g 1    atorvastatin (LIPITOR) 40 MG tablet TAKE 1 TABLET BY MOUTH EVERY DAY 90 tablet 1    Solriamfetol HCl (SUNOSI) 150 MG TABS Take 150 mg by mouth daily 90 tablet 1    loratadine (CLARITIN) 10 MG capsule Take 10 mg by mouth daily      ipratropium (ATROVENT) 0.06 % nasal spray 2 sprays by Each Nostril route 3 times daily 15 mL 1    meclizine (ANTIVERT) 25 MG tablet Take 1 tablet by mouth every 8 hours as needed for Dizziness 30 tablet 0    silodosin (RAPAFLO) 8 MG CAPS Take 1 capsule by mouth every evening 30 capsule 11    apixaban (ELIQUIS) 5 MG TABS tablet Take 1 tablet by mouth 2 times daily 180 tablet 3    ketoconazole (NIZORAL) 2 % cream Apply topically daily. Am and  pm 30 g 1    hydrocortisone 1 % cream Apply topically 2 times daily Apply topically 2 times daily. ibuprofen (ADVIL;MOTRIN) 200 MG tablet Take 200 mg by mouth every 6 hours as needed for Pain      Multiple Vitamins-Minerals (ICAPS AREDS 2 PO) Take 1 capsule by mouth 2 times daily      aspirin 81 MG chewable tablet Take 81 mg by mouth daily. No current facility-administered medications for this visit.            Orders Placed This Encounter   Procedures    TSH with Reflex     Standing Status:   Future     Standing Expiration Date:   8/11/2023    Lipid Panel     Standing Status:   Future     Standing Expiration Date:   8/11/2023     Order Specific Question:   Is Patient Fasting?/# of Hours     Answer:   YES 12 HOURS    Comprehensive Metabolic Panel     Standing Status:   Future     Standing Expiration Date:   8/11/2023    PSA, Prostatic Specific Antigen     Standing Status:   Future     Standing Expiration Date:   8/11/2023    CBC with Auto Differential     Standing Status:   Future     Standing Expiration Date:   8/11/2023         See  in   4  mth       Well exam    Radha Judge MD

## 2022-09-01 ENCOUNTER — HOSPITAL ENCOUNTER (OUTPATIENT)
Dept: AUDIOLOGY | Age: 72
Discharge: HOME | End: 2022-09-01

## 2022-09-01 PROCEDURE — 9990000010 HC NO CHARGE VISIT: Performed by: AUDIOLOGIST

## 2022-09-01 NOTE — PROGRESS NOTES
HEARING AID PROBLEM: Left hearing aid is not working.  is spinning. Did not have correct  in stock- ordered. Kept patient's hearing aid. As soon as the  comes in, I will replace it and call the patient to  the hearing aid. He is aware that there will be a $100 charge for the new .

## 2022-09-08 ENCOUNTER — HOSPITAL ENCOUNTER (OUTPATIENT)
Dept: AUDIOLOGY | Age: 72
Discharge: HOME | End: 2022-09-08

## 2022-09-08 ENCOUNTER — TELEPHONE (OUTPATIENT)
Dept: AUDIOLOGY | Age: 72
End: 2022-09-08

## 2022-09-08 PROCEDURE — V5014 HEARING AID REPAIR/MODIFYING: HCPCS | Performed by: AUDIOLOGIST

## 2022-09-08 NOTE — PROGRESS NOTES
ACCOUNT #: [de-identified]    DIAGNOSIS: Sensorineural hearing loss of both ears. HEARING AID : Patient picked up repaired left hearing aid. When he picked it up, it was not working. CM tried a new battery, still not working- listening check confirmed. Replaced with another battery and then a . The hearing aid worked. I told Tr Means to tell the patient to try the hearing aid. If it is intermittent, we will need to send it in but we will subtract $100 off of the repair charge. Billed $100 for new /hearing aid repair today.

## 2022-09-08 NOTE — TELEPHONE ENCOUNTER
The new  came in for the patient's left hearing aid. A listening check revealed that the hearing aid is functioning properly with the new . CM- please call patient to  the hearing aid. He owes $100.00 for the new .

## 2022-09-09 ENCOUNTER — PATIENT MESSAGE (OUTPATIENT)
Dept: PULMONOLOGY | Age: 72
End: 2022-09-09

## 2022-09-16 NOTE — TELEPHONE ENCOUNTER
Patient called to check in on this. I do not see where a Prior Auth was ever initiated. I called and spoke to [facility] AT [place]. They will be calling me back to complete this PA.

## 2022-09-19 ENCOUNTER — PATIENT MESSAGE (OUTPATIENT)
Dept: PULMONOLOGY | Age: 72
End: 2022-09-19

## 2022-09-19 ENCOUNTER — TELEPHONE (OUTPATIENT)
Dept: AUDIOLOGY | Age: 72
End: 2022-09-19

## 2022-09-19 DIAGNOSIS — G47.10 HYPERSOMNIA: Primary | ICD-10-CM

## 2022-09-19 RX ORDER — METHYLPHENIDATE HYDROCHLORIDE 20 MG/1
20 TABLET ORAL 3 TIMES DAILY
COMMUNITY
End: 2022-09-19 | Stop reason: SDUPTHER

## 2022-09-19 RX ORDER — METHYLPHENIDATE HYDROCHLORIDE 20 MG/1
20 TABLET ORAL 3 TIMES DAILY
Qty: 270 TABLET | Refills: 0 | Status: SHIPPED | OUTPATIENT
Start: 2022-09-19 | End: 2022-12-18

## 2022-09-19 NOTE — TELEPHONE ENCOUNTER
From: Tenzin Stahl  To: Jamie Posada  Sent: 9/19/2022 10:30 AM EDT  Subject: Methylphenidate    I need a new prescription for my methylphenidate hcl. Per my last visit, 20mg, 3 per day, 90 day supply (270 pills). Please send to [facility].

## 2022-09-19 NOTE — TELEPHONE ENCOUNTER
Sent LEFT hearing aid in to [facility] for repair due to intermittent function, even though  has been replaced. [Dear  ___] : Dear  [unfilled], [Consult Letter:] : I had the pleasure of evaluating your patient, [unfilled]. [Please see my note below.] : Please see my note below. [Consult Closing:] : Thank you very much for allowing me to participate in the care of this patient.  If you have any questions, please do not hesitate to contact me. [Sincerely,] : Sincerely, [FreeTextEntry2] : Lonnie Hall M.D. [FreeTextEntry3] : Kvng Patterson M.D., [facility]\par

## 2022-09-19 NOTE — TELEPHONE ENCOUNTER
Patient dropped off his LEFT hearing aid. He states that it does not work at all. The patient changed the battery and no out put. The hearing aid was just sent in for repair.

## 2022-09-26 NOTE — TELEPHONE ENCOUNTER
Patient's left hearing is back from repair. CM- please call patient to  hearing aid. Repair charge is $250.00. We will subtract the $100.00 he already paid for a new . He owes $150.00. Hearing aid now has warranty on it until 9/28/2023.

## 2022-10-03 ENCOUNTER — HOSPITAL ENCOUNTER (OUTPATIENT)
Dept: AUDIOLOGY | Age: 72
Discharge: HOME | End: 2022-10-03

## 2022-10-03 ENCOUNTER — NURSE ONLY (OUTPATIENT)
Dept: FAMILY MEDICINE CLINIC | Age: 72
End: 2022-10-03

## 2022-10-03 DIAGNOSIS — Z23 NEED FOR INFLUENZA VACCINATION: Primary | ICD-10-CM

## 2022-10-03 PROCEDURE — 90688 IIV4 VACCINE SPLT 0.5 ML IM: CPT | Performed by: FAMILY MEDICINE

## 2022-10-03 PROCEDURE — G0008 ADMIN INFLUENZA VIRUS VAC: HCPCS | Performed by: FAMILY MEDICINE

## 2022-10-03 PROCEDURE — V5014 HEARING AID REPAIR/MODIFYING: HCPCS | Performed by: AUDIOLOGIST

## 2022-10-03 NOTE — TELEPHONE ENCOUNTER
Patient picked up repaired hearing aid. The patient wants to be bill for the $150. He is on your schedule 10-3-22 for billing.

## 2022-10-03 NOTE — PROGRESS NOTES
Immunizations Administered       Name Date Dose Route    Influenza, AFLURIA (age 1 yrs+), FLUZONE, (age 10 mo+), MDV, 0.5mL 10/3/2022 0.5 mL Intramuscular    Site: Deltoid- Left    Lot: [id number]    NDC: 16555-539-28

## 2022-10-04 ENCOUNTER — OFFICE VISIT (OUTPATIENT)
Dept: PULMONOLOGY | Age: 72
End: 2022-10-04
Payer: COMMERCIAL

## 2022-10-04 VITALS
HEART RATE: 109 BPM | BODY MASS INDEX: 22.44 KG/M2 | OXYGEN SATURATION: 94 % | TEMPERATURE: 97.8 F | DIASTOLIC BLOOD PRESSURE: 72 MMHG | HEIGHT: 67 IN | SYSTOLIC BLOOD PRESSURE: 118 MMHG | WEIGHT: 143 LBS

## 2022-10-04 DIAGNOSIS — G47.10 HYPERSOMNIA: ICD-10-CM

## 2022-10-04 DIAGNOSIS — R41.89 BRAIN FOG: ICD-10-CM

## 2022-10-04 DIAGNOSIS — G47.31 COMPLEX SLEEP APNEA SYNDROME: Primary | ICD-10-CM

## 2022-10-04 PROCEDURE — 99213 OFFICE O/P EST LOW 20 MIN: CPT | Performed by: PHYSICIAN ASSISTANT

## 2022-10-04 PROCEDURE — 1123F ACP DISCUSS/DSCN MKR DOCD: CPT | Performed by: PHYSICIAN ASSISTANT

## 2022-10-04 ASSESSMENT — ENCOUNTER SYMPTOMS
ALLERGIC/IMMUNOLOGIC NEGATIVE: 1
STRIDOR: 0
EYES NEGATIVE: 1
DIARRHEA: 0
CHEST TIGHTNESS: 0
BACK PAIN: 0
COUGH: 0
WHEEZING: 0
NAUSEA: 0
SHORTNESS OF BREATH: 0

## 2022-10-04 NOTE — PROGRESS NOTES
[place] [facility]      Felicitas Vickers         [id number]  10/4/2022   Chief Complaint   Patient presents with    Follow-up     3 month JAY/Narcolepsy follow up         Pt of Lily KOVACS Download:   Original or initial AHI: 14     Date of initial study: 12/27/2000      Compliant  100%     Noncompliant 0 %     PAP Type ASVAuto   Level  5/15/3/15 cmH2O  Avg Hrs/Day 6 hours 9 minutes   AHI: 0.4  Recorded compliance dates , 09/03/2022 to 10/02/2022   Machine/Mfg:   [x] ResMed    [] Respironics/Dreamstation   Interface:   [x] Nasal    [] Nasal pillows   [] FFM      Provider:      [] [facility]     []Anca     [] Ganesh    [x] Gloria Weaver    [] [place]               [] [facility]      [] Adaptive    [] [address]      [] Other    Neck Size: 13.75  Mallampati Mallampati 3  ESS:  11  SAQLI: 88    Here is a scan of the most recent download:                Presentation:   Justo Choi presents for sleep medicine follow up for complex sleep apnea   Since the last visit, Justo Choi is doing ok with medications. He is taking Sunosi and Ritalin. He is taking Ritalin 2-3 times a day with benefit. He was told years ago he has narcolepsy but  MSLT not available. Equipment issues: The pressure is  acceptable, the mask is acceptable     Sleep issues:  Do you feel better? Yes and No  More rested? Sometimes   Better concentration? yes    Progress History:   Since last visit any new medical issues? No  New ER or hospital visits? No  Any new or changes in medicines? No  Any new sleep medicines? No    Review of Systems -   Review of Systems   Constitutional:  Negative for activity change, appetite change, chills and fever. HENT:  Negative for congestion and postnasal drip. Eyes: Negative. Respiratory:  Negative for cough, chest tightness, shortness of breath, wheezing and stridor. Cardiovascular:  Negative for chest pain and leg swelling. Gastrointestinal:  Negative for diarrhea and nausea. Endocrine: Negative. Genitourinary: Negative. Musculoskeletal: Negative. Negative for arthralgias and back pain. Skin: Negative. Allergic/Immunologic: Negative. Neurological: Negative. Negative for dizziness and light-headedness. Psychiatric/Behavioral: Negative. All other systems reviewed and are negative. Physical Exam:    BMI:  Body mass index is 22.4 kg/m². Wt Readings from Last 3 Encounters:   10/04/22 143 lb (64.9 kg)   08/11/22 141 lb 8 oz (64.2 kg)   06/20/22 143 lb 6.4 oz (65 kg)     Weight stable / unchanged  Vitals: /72   Pulse (!) 109   Temp 97.8 °F (36.6 °C)   Ht 5' 7\" (1.702 m)   Wt 143 lb (64.9 kg)   SpO2 94%   BMI 22.40 kg/m²       Physical Exam  Constitutional:       Appearance: Normal appearance. He is normal weight. HENT:      Head: Normocephalic and atraumatic. Right Ear: External ear normal.      Left Ear: External ear normal.      Nose: Nose normal.   Eyes:      Extraocular Movements: Extraocular movements intact. Conjunctiva/sclera: Conjunctivae normal.      Pupils: Pupils are equal, round, and reactive to light. Pulmonary:      Effort: Pulmonary effort is normal.   Musculoskeletal:      Cervical back: Normal range of motion and neck supple. Neurological:      General: No focal deficit present. Mental Status: He is alert and oriented to person, place, and time. Psychiatric:         Attention and Perception: Attention and perception normal.         Mood and Affect: Mood and affect normal.         Speech: Speech normal.         Behavior: Behavior normal. Behavior is cooperative. Thought Content: Thought content normal.         Cognition and Memory: Cognition normal.         Judgment: Judgment normal.         ASSESSMENT/DIAGNOSIS     Diagnosis Orders   1. Complex sleep apnea syndrome        2. Hypersomnia        3. Brain fog                 Plan   Do you need any equipment today?  No  - will need new MSLT if he fails current medications  - MSLT not available for 20 years ago   - Download reviewed and discussed with patient  - He  was advised to continue current positive airway pressure therapy with above described pressure. - He  advised to keep good compliance with current recommended pressure to get optimal results and clinical improvement  - Recommend 7-9 hours of sleep with PAP  - He was advised to call [employer] regarding supplies if needed.   -He call my office for earlier appointment if needed for worsening of sleep symptoms.   - He was instructed on weight loss  - Ace Hendricks was educated about my impression and plan. Patient verbalizesunderstanding.   We will see Justin Beckwith back in: 6 months with download    Information added by my medical assistant/LPN was reviewed today       Selena Christianson, [address] for pulmonary and Sleep Medicine  10/4/2022

## 2022-10-05 ENCOUNTER — PATIENT MESSAGE (OUTPATIENT)
Dept: UROLOGY | Age: 72
End: 2022-10-05

## 2022-10-05 RX ORDER — SILODOSIN 8 MG/1
8 CAPSULE ORAL EVERY EVENING
Qty: 30 CAPSULE | Refills: 11 | Status: SHIPPED | OUTPATIENT
Start: 2022-10-05

## 2022-10-05 NOTE — TELEPHONE ENCOUNTER
From: Roney Myers  To: Dr. Crouch Lav: 10/5/2022 10:25 AM EDT  Subject: Medication    At my last visit, Miguel Angel Ashley said that there was a systemic medication available for my BPH symptoms. My next office visit is scheduled for 12/5/2022. If it is possible to get that medication prescribed without needing an office visit, I would like to try it. If prescribing a new medication without an office visit is not possible, I need a new prescription for silidosin, 8mg, one per day, sent to [facility]. Thanks.

## 2022-10-17 ENCOUNTER — TELEPHONE (OUTPATIENT)
Dept: AUDIOLOGY | Age: 72
End: 2022-10-17

## 2022-10-17 LAB
ABSOLUTE BASO #: 0.06 K/UL (ref 0–0.2)
ABSOLUTE EOS #: 0.25 K/UL (ref 0–0.5)
ABSOLUTE LYMPH #: 0.95 K/UL (ref 1–4)
ABSOLUTE MONO #: 0.47 K/UL (ref 0.2–1)
ABSOLUTE NEUT #: 3.43 K/UL (ref 1.5–7.5)
ALBUMIN SERPL-MCNC: 4.3 G/DL (ref 3.5–5.2)
ALK PHOSPHATASE: 125 U/L (ref 40–125)
ALT SERPL-CCNC: 22 U/L (ref 5–50)
ANION GAP SERPL CALCULATED.3IONS-SCNC: 7 MEQ/L (ref 7–16)
AST SERPL-CCNC: 19 U/L (ref 9–50)
BASOPHILS RELATIVE PERCENT: 1.2 %
BILIRUB SERPL-MCNC: 0.9 MG/DL
BUN BLDV-MCNC: 19 MG/DL (ref 8–23)
CALCIUM SERPL-MCNC: 8.9 MG/DL (ref 8.5–10.5)
CHLORIDE BLD-SCNC: 106 MEQ/L (ref 95–107)
CHOLESTEROL/HDL RATIO: 2.4 RATIO
CHOLESTEROL: 141 MG/DL
CO2: 30 MEQ/L (ref 19–31)
CREAT SERPL-MCNC: 0.97 MG/DL (ref 0.8–1.4)
EGFR IF NONAFRICAN AMERICAN: 83 ML/MIN/1.73
EOSINOPHILS RELATIVE PERCENT: 4.8 %
GLUCOSE: 109 MG/DL (ref 70–99)
HCT VFR BLD CALC: 45.6 % (ref 40–51)
HDLC SERPL-MCNC: 58 MG/DL
HEMOGLOBIN: 15.8 G/DL (ref 13.5–17)
LDL CHOLESTEROL CALCULATED: 72 MG/DL
LDL/HDL RATIO: 1.2 RATIO
LYMPHOCYTE %: 18.4 %
MCH RBC QN AUTO: 31.5 PG (ref 25–33)
MCHC RBC AUTO-ENTMCNC: 34.6 G/DL (ref 31–36)
MCV RBC AUTO: 91 FL (ref 80–99)
MONOCYTES # BLD: 9.1 %
NEUTROPHILS RELATIVE PERCENT: 66.3 %
PDW BLD-RTO: 11.9 % (ref 11.5–15)
PLATELETS: 227 K/UL (ref 130–400)
PMV BLD AUTO: 9.5 FL (ref 9.3–13)
POTASSIUM SERPL-SCNC: 3.9 MEQ/L (ref 3.5–5.4)
PSA, ULTRASENSITIVE: 4.51 NG/ML
RBC: 5.01 M/UL (ref 4.5–6.1)
SODIUM BLD-SCNC: 143 MEQ/L (ref 133–146)
TOTAL PROTEIN: 6.5 G/DL (ref 6.1–8.3)
TRIGL SERPL-MCNC: 55 MG/DL
TSH SERPL DL<=0.05 MIU/L-ACNC: 2.31 UIU/ML (ref 0.4–4.1)
VLDLC SERPL CALC-MCNC: 11 MG/DL
WBC: 5.2 K/UL (ref 3.5–11)

## 2022-10-17 NOTE — TELEPHONE ENCOUNTER
Patient dropped off RIGHT hearing aid . He states that the  is not working properly. About 2-3 weeks ago, it kept cutting in & out. Patient wants updated if it can not be fixed here.

## 2022-10-18 ENCOUNTER — PATIENT MESSAGE (OUTPATIENT)
Dept: FAMILY MEDICINE CLINIC | Age: 72
End: 2022-10-18

## 2022-10-18 ENCOUNTER — HOSPITAL ENCOUNTER (OUTPATIENT)
Dept: AUDIOLOGY | Age: 72
Discharge: HOME | End: 2022-10-18

## 2022-10-18 ENCOUNTER — TELEPHONE (OUTPATIENT)
Dept: FAMILY MEDICINE CLINIC | Age: 72
End: 2022-10-18

## 2022-10-18 DIAGNOSIS — R97.20 ELEVATED PSA, LESS THAN 10 NG/ML: Primary | ICD-10-CM

## 2022-10-18 PROCEDURE — 92592 HC HEARING AID CHECK, ONE EAR: CPT | Performed by: AUDIOLOGIST

## 2022-10-18 RX ORDER — SULFAMETHOXAZOLE AND TRIMETHOPRIM 800; 160 MG/1; MG/1
1 TABLET ORAL 2 TIMES DAILY
Qty: 30 TABLET | Refills: 0 | Status: SHIPPED | OUTPATIENT
Start: 2022-10-18 | End: 2022-11-02

## 2022-10-18 NOTE — TELEPHONE ENCOUNTER
Cleaned and checked the right HA today. The  is spinning, however, his hearing aids have worked in the past with the  spinning. Replaced wax filter, dome and microphone cover. A listening check revealed normal output. I called the patient and gave him options. He can try today's cleaning/new marichuy cover for $10.00 and see if it still cuts out or not. The other option is to replace the  now. The patient wishes to try the cleaning/new microphone cover for $10.00. He will  the hearing aid today.

## 2022-10-18 NOTE — TELEPHONE ENCOUNTER
----- Message from Merline Hardin MD sent at 10/18/2022  6:42 AM EDT -----  Labs all good  as  chol  141 but psa up at 4.51 and  10mths ago  3.10 so  bactrim for  15 days and repeat in jan psa. . please call

## 2022-10-19 NOTE — TELEPHONE ENCOUNTER
From: Miko Walker  To: Dr. Melinda Abbott  Sent: 10/18/2022 2:23 PM EDT  Subject: Leigh Cierra Booster    I just wanted to let you know that I had a Covid-19 Bi-Valent booster on 10/17/2022, from the [facility] in the [facility].     GB

## 2022-10-21 ENCOUNTER — TELEPHONE (OUTPATIENT)
Dept: AUDIOLOGY | Age: 72
End: 2022-10-21

## 2022-10-21 NOTE — TELEPHONE ENCOUNTER
The patient dropped off his right hearing aid today. It is still functioning intermittently since it was cleaned/checked here. He wishes for the hearing aid to be sent in for repair. Sent the aid to [facility] today. Will call patient to  the hearing aid when it comes back from repair.

## 2022-10-26 ENCOUNTER — OFFICE VISIT (OUTPATIENT)
Dept: CARDIOLOGY CLINIC | Age: 72
End: 2022-10-26
Payer: COMMERCIAL

## 2022-10-26 VITALS
BODY MASS INDEX: 22.57 KG/M2 | HEART RATE: 87 BPM | HEIGHT: 67 IN | WEIGHT: 143.8 LBS | DIASTOLIC BLOOD PRESSURE: 82 MMHG | SYSTOLIC BLOOD PRESSURE: 134 MMHG

## 2022-10-26 DIAGNOSIS — R06.02 SHORTNESS OF BREATH: Primary | ICD-10-CM

## 2022-10-26 DIAGNOSIS — I48.0 PAROXYSMAL ATRIAL FIBRILLATION (HCC): ICD-10-CM

## 2022-10-26 DIAGNOSIS — I25.119 CORONARY ARTERY DISEASE INVOLVING NATIVE CORONARY ARTERY OF NATIVE HEART WITH ANGINA PECTORIS (HCC): ICD-10-CM

## 2022-10-26 DIAGNOSIS — I10 ESSENTIAL HYPERTENSION: ICD-10-CM

## 2022-10-26 PROCEDURE — 3074F SYST BP LT 130 MM HG: CPT | Performed by: NUCLEAR MEDICINE

## 2022-10-26 PROCEDURE — 3078F DIAST BP <80 MM HG: CPT | Performed by: NUCLEAR MEDICINE

## 2022-10-26 PROCEDURE — 1123F ACP DISCUSS/DSCN MKR DOCD: CPT | Performed by: NUCLEAR MEDICINE

## 2022-10-26 PROCEDURE — 99214 OFFICE O/P EST MOD 30 MIN: CPT | Performed by: NUCLEAR MEDICINE

## 2022-10-26 PROCEDURE — 93000 ELECTROCARDIOGRAM COMPLETE: CPT | Performed by: NUCLEAR MEDICINE

## 2022-10-26 NOTE — PROGRESS NOTES
[address]  [address]  [facility] [address]  Dept: [phone]  Dept Fax: [phone]  Loc: [phone]    Visit Date: 10/26/2022    Elis Loo is a 71 y.o. male who presents todayfor:  Chief Complaint   Patient presents with    6 Month Follow-Up    Hypertension    Coronary Artery Disease    Atrial Fibrillation   More dyspnea  More than usual   Know stent to prox LAD  Done    Progressive dyspnea  Know A fib   He doesn't feel it   Does have risk for CAD  Does have HTN on medical RX  Seems stable   On statins for hyperlipidemia  No bleeding       HPI:  HPI  Past Medical History:   Diagnosis Date    Actinic keratoses     Angina effort      Replacing deprecated diagnoses    Arthritis     hands    ASHD (arteriosclerotic heart disease)     Carotid disease, bilateral ([address] 2017    Colon polyps 2007    Hypercholesteremia 1997    Narcolepsy     Rotator cuff (capsule) sprain     Tobacco abuse     Unspecified sleep apnea     cpap      Past Surgical History:   Procedure Laterality Date    APPENDECTOMY      CATARACT REMOVAL Bilateral     Right- Oct, Left- Dec- DR ROBISON     COLONOSCOPY  ,     sheik    CORONARY ANGIOPLASTY WITH STENT PLACEMENT      CORONARY ANGIOPLASTY WITH STENT PLACEMENT  2017    2 nd stent to LAD    HEMORRHOID SURGERY  2016    Dr Jim Mahoney, every 2 weeks x 3 times     [facility] Left     Dr Paula De Luna @ [facility]   rt 2015    TONSILLECTOMY       Family History   Problem Relation Age of Onset    Stroke Mother     Diabetes Mother     Heart Disease Father     Asthma Father      Social History     Tobacco Use    Smoking status: Former     Packs/day: 1.00     Years: 45.00     Pack years: 45.00     Types: Cigarettes     Quit date: 2005     Years since quittin.3    Smokeless tobacco: Never   Substance Use Topics    Alcohol use:  Yes     Alcohol/week: 8.0 standard drinks     Types: 8 Cans of beer per week     Comment: social      Current Outpatient Medications   Medication Sig Dispense Refill    sulfamethoxazole-trimethoprim (BACTRIM DS) 800-160 MG per tablet Take 1 tablet by mouth 2 times daily for 15 days 30 tablet 0    apixaban (ELIQUIS) 5 MG TABS tablet Take 1 tablet by mouth 2 times daily 180 tablet 0    silodosin (RAPAFLO) 8 MG CAPS Take 1 capsule by mouth every evening 30 capsule 11    methylphenidate (RITALIN) 20 MG tablet Take 1 tablet by mouth 3 times daily for 90 days. 270 tablet 0    fluocinonide (LIDEX) 0.05 % gel Apply topically 2 times daily. 15 g 1    atorvastatin (LIPITOR) 40 MG tablet TAKE 1 TABLET BY MOUTH EVERY DAY 90 tablet 1    Solriamfetol HCl (SUNOSI) 150 MG TABS Take 150 mg by mouth daily 90 tablet 1    loratadine (CLARITIN) 10 MG capsule Take 10 mg by mouth daily      ipratropium (ATROVENT) 0.06 % nasal spray 2 sprays by Each Nostril route 3 times daily 15 mL 1    meclizine (ANTIVERT) 25 MG tablet Take 1 tablet by mouth every 8 hours as needed for Dizziness 30 tablet 0    ketoconazole (NIZORAL) 2 % cream Apply topically daily. Am and  pm 30 g 1    hydrocortisone 1 % cream Apply topically 2 times daily Apply topically 2 times daily. ibuprofen (ADVIL;MOTRIN) 200 MG tablet Take 200 mg by mouth every 6 hours as needed for Pain      Multiple Vitamins-Minerals (ICAPS AREDS 2 PO) Take 1 capsule by mouth 2 times daily      aspirin 81 MG chewable tablet Take 81 mg by mouth daily. No current facility-administered medications for this visit.      Allergies   Allergen Reactions    Adderall [Amphetamine-Dextroamphetamine] Shortness Of Breath    Flomax [Tamsulosin] Other (See Comments)     Back pain     Metoprolol Other (See Comments)     Lower back pain    Other Other (See Comments)     brilenta-low back pain     Polysporin [Bacitracin-Polymyxin B] Hives    Provigil [Modafinil] Other (See Comments)     Low back pain      Health Maintenance   Topic Date Due    DTaP/Tdap/Td vaccine (1 - Tdap) 09/02/1997    Colorectal Cancer Screen  12/05/2022    Depression Screen  08/11/2023    Lipids  10/17/2023    Prostate Specific Antigen (PSA) Screening or Monitoring  10/17/2023    Flu vaccine  Completed    Shingles vaccine  Completed    Pneumococcal 65+ years Vaccine  Completed    COVID-19 Vaccine  Completed    AAA screen  Completed    Hepatitis C screen  Completed    Hepatitis A vaccine  Aged Out    Hib vaccine  Aged Out    Meningococcal (ACWY) vaccine  Aged Out       Subjective:  Review of Systems  General:   No fever, no chills, some fatigue or weight loss  Pulmonary:    Some more dyspnea, no wheezing  Cardiac:    Denies recent chest pain,   GI:     No nausea or vomiting, no abdominal pain  Neuro:     No dizziness or light headedness,   Musculoskeletal:  No recent active issues  Extremities:   No edema, no obvious claudication     Objective:  Physical Exam  /82   Pulse 87   Ht 5' 7\" (1.702 m)   Wt 143 lb 12.8 oz (65.2 kg)   BMI 22.52 kg/m²   General:   Well developed, well nourished  Lungs:    Clear to auscultation  Heart:    Normal S1 S2, Slight murmur. no rubs, no gallops  Abdomen:   Soft, non tender, no organomegalies, positive bowel sounds  Extremities:   No edema, no cyanosis, good peripheral pulses  Neurological:   Awake, alert, oriented. No obvious focal deficits  Musculoskelatal:  No obvious deformities    Assessment:      Diagnosis Orders   1. Shortness of breath  EKG 12 lead      2. Paroxysmal atrial fibrillation (HCC)  EKG 12 lead      3. Coronary artery disease involving native coronary artery of native heart with angina pectoris (HCC)        Higher risk   Symptoms as above   Higher concern   Stent s before   ECG in office was done today. I reviewed the ECG. No acute findings      Plan:  No follow-ups on file. Discussed  Cardiac cathterizaion, risks and benefits discussed with the patient and family at length. Patient was agreeable.   Continue risk factor modification and medical management  Thank you for allowing me to participate in the care of your patient. Please don't hesitate to contact me regarding any further issues related to the patient care    Orders Placed:  Orders Placed This Encounter   Procedures    EKG 12 lead     Order Specific Question:   Reason for Exam?     Answer: Other       Medications Prescribed:  No orders of the defined types were placed in this encounter. Discussed use, benefit, and side effects of prescribed medications. All patient questions answered. Pt voicedunderstanding. Instructed to continue current medications, diet and exercise. Continue risk factor modification and medical management. Patient agreed with treatment plan. Follow up as directed.     Electronically signedby Vikram Knox MD on 10/26/2022 at 9:09 AM

## 2022-10-28 ENCOUNTER — HOSPITAL ENCOUNTER (OUTPATIENT)
Dept: AUDIOLOGY | Age: 72
Discharge: HOME | End: 2022-10-28

## 2022-10-28 PROCEDURE — V5014 HEARING AID REPAIR/MODIFYING: HCPCS | Performed by: AUDIOLOGIST

## 2022-10-28 NOTE — PROGRESS NOTES
ACCOUNT #: [de-identified]    DIAGNOSIS: Sensorineural hearing loss of both ears. HEARING AID : Patient picked up repaired RIGHT hearing aid. Billed $250.00 for hearing aid repair.

## 2022-11-08 ENCOUNTER — PRE-PROCEDURE TELEPHONE (OUTPATIENT)
Dept: INPATIENT UNIT | Age: 72
End: 2022-11-08

## 2022-11-08 ENCOUNTER — PREP FOR PROCEDURE (OUTPATIENT)
Dept: CARDIOLOGY | Age: 72
End: 2022-11-08

## 2022-11-08 RX ORDER — ASPIRIN 325 MG
325 TABLET ORAL ONCE
Status: CANCELLED | OUTPATIENT
Start: 2022-11-08 | End: 2022-11-08

## 2022-11-08 RX ORDER — SODIUM CHLORIDE 0.9 % (FLUSH) 0.9 %
5-40 SYRINGE (ML) INJECTION PRN
Status: CANCELLED | OUTPATIENT
Start: 2022-11-08

## 2022-11-08 RX ORDER — SODIUM CHLORIDE 9 MG/ML
INJECTION, SOLUTION INTRAVENOUS CONTINUOUS
Status: CANCELLED | OUTPATIENT
Start: 2022-11-08

## 2022-11-08 RX ORDER — SODIUM CHLORIDE 9 MG/ML
INJECTION, SOLUTION INTRAVENOUS PRN
Status: CANCELLED | OUTPATIENT
Start: 2022-11-08

## 2022-11-08 RX ORDER — SODIUM CHLORIDE 0.9 % (FLUSH) 0.9 %
5-40 SYRINGE (ML) INJECTION EVERY 12 HOURS SCHEDULED
Status: CANCELLED | OUTPATIENT
Start: 2022-11-08

## 2022-11-08 RX ORDER — NITROGLYCERIN 0.4 MG/1
0.4 TABLET SUBLINGUAL EVERY 5 MIN PRN
Status: CANCELLED | OUTPATIENT
Start: 2022-11-08

## 2022-11-09 ENCOUNTER — HOSPITAL ENCOUNTER (OUTPATIENT)
Dept: INPATIENT UNIT | Age: 72
Setting detail: OBSERVATION
Discharge: HOME | End: 2022-11-10
Attending: NUCLEAR MEDICINE | Admitting: NUCLEAR MEDICINE
Payer: COMMERCIAL

## 2022-11-09 PROBLEM — Z95.820 STATUS POST ANGIOPLASTY WITH STENT: Status: ACTIVE | Noted: 2022-11-09

## 2022-11-09 LAB
ABO: NORMAL
ACTIVATED CLOTTING TIME: 381 SECONDS (ref 1–150)
ANION GAP SERPL CALCULATED.3IONS-SCNC: 10 MEQ/L (ref 8–16)
ANTIBODY SCREEN: NORMAL
APTT: 36.3 SECONDS (ref 22–38)
BUN BLDV-MCNC: 15 MG/DL (ref 7–22)
CALCIUM SERPL-MCNC: 9.3 MG/DL (ref 8.5–10.5)
CHLORIDE BLD-SCNC: 103 MEQ/L (ref 98–111)
CHOLESTEROL, TOTAL: 169 MG/DL (ref 100–199)
CO2: 28 MEQ/L (ref 23–33)
CREAT SERPL-MCNC: 0.9 MG/DL (ref 0.4–1.2)
EKG ATRIAL RATE: 75 BPM
EKG P AXIS: 41 DEGREES
EKG P-R INTERVAL: 180 MS
EKG Q-T INTERVAL: 364 MS
EKG QRS DURATION: 84 MS
EKG QTC CALCULATION (BAZETT): 406 MS
EKG R AXIS: 88 DEGREES
EKG T AXIS: 51 DEGREES
EKG VENTRICULAR RATE: 75 BPM
ERYTHROCYTE [DISTWIDTH] IN BLOOD BY AUTOMATED COUNT: 12.5 % (ref 11.5–14.5)
ERYTHROCYTE [DISTWIDTH] IN BLOOD BY AUTOMATED COUNT: 43.3 FL (ref 35–45)
GFR SERPL CREATININE-BSD FRML MDRD: > 60 ML/MIN/1.73M2
GLUCOSE BLD-MCNC: 107 MG/DL (ref 70–108)
HCT VFR BLD CALC: 49.1 % (ref 42–52)
HDLC SERPL-MCNC: 70 MG/DL
HEMOGLOBIN: 17 GM/DL (ref 14–18)
INR BLD: 1 (ref 0.85–1.13)
LDL CHOLESTEROL CALCULATED: 90 MG/DL
MCH RBC QN AUTO: 32.4 PG (ref 26–33)
MCHC RBC AUTO-ENTMCNC: 34.6 GM/DL (ref 32.2–35.5)
MCV RBC AUTO: 93.5 FL (ref 80–94)
PLATELET # BLD: 234 THOU/MM3 (ref 130–400)
PMV BLD AUTO: 8.7 FL (ref 9.4–12.4)
POTASSIUM SERPL-SCNC: 4.9 MEQ/L (ref 3.5–5.2)
RBC # BLD: 5.25 MILL/MM3 (ref 4.7–6.1)
RH FACTOR: NORMAL
SODIUM BLD-SCNC: 141 MEQ/L (ref 135–145)
TRIGL SERPL-MCNC: 46 MG/DL (ref 0–199)
WBC # BLD: 4.5 THOU/MM3 (ref 4.8–10.8)

## 2022-11-09 PROCEDURE — 93458 L HRT ARTERY/VENTRICLE ANGIO: CPT | Performed by: NUCLEAR MEDICINE

## 2022-11-09 PROCEDURE — C1874 STENT, COATED/COV W/DEL SYS: HCPCS

## 2022-11-09 PROCEDURE — 92928 PRQ TCAT PLMT NTRAC ST 1 LES: CPT | Performed by: INTERNAL MEDICINE

## 2022-11-09 PROCEDURE — C9600 PERC DRUG-EL COR STENT SING: HCPCS

## 2022-11-09 PROCEDURE — 6360000004 HC RX CONTRAST MEDICATION: Performed by: INTERNAL MEDICINE

## 2022-11-09 PROCEDURE — C1769 GUIDE WIRE: HCPCS

## 2022-11-09 PROCEDURE — 93005 ELECTROCARDIOGRAM TRACING: CPT | Performed by: PHYSICIAN ASSISTANT

## 2022-11-09 PROCEDURE — 93010 ELECTROCARDIOGRAM REPORT: CPT | Performed by: NUCLEAR MEDICINE

## 2022-11-09 PROCEDURE — 86850 RBC ANTIBODY SCREEN: CPT

## 2022-11-09 PROCEDURE — G0378 HOSPITAL OBSERVATION PER HR: HCPCS

## 2022-11-09 PROCEDURE — 93005 ELECTROCARDIOGRAM TRACING: CPT | Performed by: INTERNAL MEDICINE

## 2022-11-09 PROCEDURE — 86900 BLOOD TYPING SEROLOGIC ABO: CPT

## 2022-11-09 PROCEDURE — 6360000002 HC RX W HCPCS

## 2022-11-09 PROCEDURE — 2500000003 HC RX 250 WO HCPCS

## 2022-11-09 PROCEDURE — C1894 INTRO/SHEATH, NON-LASER: HCPCS

## 2022-11-09 PROCEDURE — 80061 LIPID PANEL: CPT

## 2022-11-09 PROCEDURE — C1725 CATH, TRANSLUMIN NON-LASER: HCPCS

## 2022-11-09 PROCEDURE — 2580000003 HC RX 258: Performed by: PHYSICIAN ASSISTANT

## 2022-11-09 PROCEDURE — 93458 L HRT ARTERY/VENTRICLE ANGIO: CPT

## 2022-11-09 PROCEDURE — 6370000000 HC RX 637 (ALT 250 FOR IP): Performed by: INTERNAL MEDICINE

## 2022-11-09 PROCEDURE — 85610 PROTHROMBIN TIME: CPT

## 2022-11-09 PROCEDURE — 85730 THROMBOPLASTIN TIME PARTIAL: CPT

## 2022-11-09 PROCEDURE — 80048 BASIC METABOLIC PNL TOTAL CA: CPT

## 2022-11-09 PROCEDURE — 36415 COLL VENOUS BLD VENIPUNCTURE: CPT

## 2022-11-09 PROCEDURE — 85027 COMPLETE CBC AUTOMATED: CPT

## 2022-11-09 PROCEDURE — 85347 COAGULATION TIME ACTIVATED: CPT

## 2022-11-09 PROCEDURE — 86901 BLOOD TYPING SEROLOGIC RH(D): CPT

## 2022-11-09 PROCEDURE — 6370000000 HC RX 637 (ALT 250 FOR IP)

## 2022-11-09 PROCEDURE — C1887 CATHETER, GUIDING: HCPCS

## 2022-11-09 RX ORDER — SODIUM CHLORIDE 0.9 % (FLUSH) 0.9 %
5-40 SYRINGE (ML) INJECTION PRN
Status: DISCONTINUED | OUTPATIENT
Start: 2022-11-09 | End: 2022-11-10 | Stop reason: HOSPADM

## 2022-11-09 RX ORDER — METHYLPHENIDATE HYDROCHLORIDE 10 MG/1
20 TABLET ORAL 3 TIMES DAILY
Status: DISCONTINUED | OUTPATIENT
Start: 2022-11-09 | End: 2022-11-10 | Stop reason: HOSPADM

## 2022-11-09 RX ORDER — SODIUM CHLORIDE 9 MG/ML
INJECTION, SOLUTION INTRAVENOUS CONTINUOUS
Status: DISCONTINUED | OUTPATIENT
Start: 2022-11-09 | End: 2022-11-10 | Stop reason: HOSPADM

## 2022-11-09 RX ORDER — IPRATROPIUM BROMIDE 42 UG/1
2 SPRAY, METERED NASAL 3 TIMES DAILY
Status: DISCONTINUED | OUTPATIENT
Start: 2022-11-09 | End: 2022-11-10 | Stop reason: HOSPADM

## 2022-11-09 RX ORDER — SODIUM CHLORIDE 9 MG/ML
INJECTION, SOLUTION INTRAVENOUS PRN
Status: DISCONTINUED | OUTPATIENT
Start: 2022-11-09 | End: 2022-11-10 | Stop reason: HOSPADM

## 2022-11-09 RX ORDER — ASPIRIN 81 MG/1
81 TABLET, CHEWABLE ORAL DAILY
Status: DISCONTINUED | OUTPATIENT
Start: 2022-11-10 | End: 2022-11-10 | Stop reason: HOSPADM

## 2022-11-09 RX ORDER — MECLIZINE HCL 12.5 MG/1
25 TABLET ORAL EVERY 8 HOURS PRN
Status: DISCONTINUED | OUTPATIENT
Start: 2022-11-09 | End: 2022-11-10 | Stop reason: HOSPADM

## 2022-11-09 RX ORDER — IBUPROFEN 200 MG
200 TABLET ORAL EVERY 6 HOURS PRN
Status: DISCONTINUED | OUTPATIENT
Start: 2022-11-09 | End: 2022-11-10 | Stop reason: HOSPADM

## 2022-11-09 RX ORDER — NITROGLYCERIN 0.4 MG/1
0.4 TABLET SUBLINGUAL EVERY 5 MIN PRN
Status: DISCONTINUED | OUTPATIENT
Start: 2022-11-09 | End: 2022-11-10 | Stop reason: HOSPADM

## 2022-11-09 RX ORDER — ASPIRIN 81 MG/1
81 TABLET, CHEWABLE ORAL DAILY
Status: DISCONTINUED | OUTPATIENT
Start: 2022-11-09 | End: 2022-11-09 | Stop reason: SDUPTHER

## 2022-11-09 RX ORDER — SODIUM CHLORIDE 0.9 % (FLUSH) 0.9 %
5-40 SYRINGE (ML) INJECTION EVERY 12 HOURS SCHEDULED
Status: DISCONTINUED | OUTPATIENT
Start: 2022-11-09 | End: 2022-11-10 | Stop reason: HOSPADM

## 2022-11-09 RX ORDER — ACETAMINOPHEN 325 MG/1
650 TABLET ORAL EVERY 4 HOURS PRN
Status: DISCONTINUED | OUTPATIENT
Start: 2022-11-09 | End: 2022-11-10 | Stop reason: HOSPADM

## 2022-11-09 RX ORDER — CLOPIDOGREL BISULFATE 75 MG/1
75 TABLET ORAL DAILY
Status: DISCONTINUED | OUTPATIENT
Start: 2022-11-10 | End: 2022-11-10 | Stop reason: HOSPADM

## 2022-11-09 RX ORDER — ASPIRIN 325 MG
325 TABLET ORAL ONCE
Status: DISCONTINUED | OUTPATIENT
Start: 2022-11-09 | End: 2022-11-10 | Stop reason: HOSPADM

## 2022-11-09 RX ORDER — ATORVASTATIN CALCIUM 40 MG/1
40 TABLET, FILM COATED ORAL DAILY
Status: DISCONTINUED | OUTPATIENT
Start: 2022-11-09 | End: 2022-11-10 | Stop reason: HOSPADM

## 2022-11-09 RX ADMIN — SODIUM CHLORIDE: 9 INJECTION, SOLUTION INTRAVENOUS at 11:49

## 2022-11-09 RX ADMIN — ATORVASTATIN CALCIUM 40 MG: 40 TABLET, FILM COATED ORAL at 20:50

## 2022-11-09 RX ADMIN — METHYLPHENIDATE HYDROCHLORIDE 20 MG: 10 TABLET ORAL at 20:51

## 2022-11-09 RX ADMIN — IOPAMIDOL 140 ML: 755 INJECTION, SOLUTION INTRAVENOUS at 13:29

## 2022-11-09 NOTE — PROGRESS NOTES
1111 Patient admitted to 2E08  Ambulatory for heart cath. Patient NPO. Patient accompanied by wife. Vital signs obtained. Assessment and data collection intiated. Oriented to room. Policies and procedures for 2E explained. All questions answered with no further questions at this time. Fall prevention and safety precautions discussed with patient. Two [place] reviewed with patient and wife. Patient and wife verbalize understanding of the plan of care and contribute to goal setting. 1228 To cath lab per bed, stable condition. 1330 Care taken over from cath lab. Radial site stable, no bleeding seen, site soft. Armboard continued with vascband. Patient instructed not to bend wrist, not to put pressure on wrist and not to lift  or twist with wrist. Patient voices understanding. 1630 vascband site bleeding so  6 ml inserted for hemostasis. 166 [facility] given report, patient and wife aware of upcoming transfer. Bandaid applied to site at 1745 and vascband removed, armboard continued, site stable. Patient instructed not to bend, twist, lift, push or pull with right wrist, voices understanding. 1807 Patient  transferred to [facility] per wheelchair with transport and wife and belongings, stable condition.

## 2022-11-09 NOTE — PROCEDURES
[address] [place] [address]                            CARDIAC CATHETERIZATION    PATIENT NAME: Camila Herrera                   :        1950  MED REC NO:   [id number]                           ROOM:       0008  ACCOUNT NO:   [de-identified]                           ADMIT DATE: 2022  PROVIDER:     FRANSISCA Charles Lizbeth:  2022    CLINICAL HISTORY AND INDICATION:  This is a gentleman with recurrent  symptoms of shortness of breath on exertion, multiple risk factors for  coronary artery disease, previous multiple revascularizations and  stenting of the LAD, referred for cardiac cath to evaluate coronary  anatomy. PROCEDURES:  1. Left heart cath with left ventriculogram.  2.  Coronary angiogram, right and left. 3.  Sedation, 2 of Versed, 50 of fentanyl between 01:00 and 01:30 p.m.  in my presence under my supervision. 4.  Blood loss less than 10 mL. PROCEDURE DETAILS:  Please refer to my catheterization detailed note. HEMODYNAMIC RESULTS:  Left ventriculogram:  End-diastolic pressure was  12 mmHg. No significant change before and after contrast injection. No  significant gradient across the aortic valve to signify aortic stenosis. Left ventricular function was globally within normal limits. EF 55%. CORONARY ARTERIOGRAM RESULTS:  1. Left main is patent, gives rise to left anterior descending and left  circumflex artery. 2.  Left anterior descending artery has a proximal stent which is  patent; however, distal site of the stent seems to be significantly  stenosed, about 70-80%. There is a distal stent which is patent. 3.  Left circumflex artery has diffuse nonobstructive mild luminal  irregularity. 4.  Right coronary artery has diffuse 30-40% stenosis with luminal  irregularity, otherwise no critical stenosis. CONCLUSION:  1.   Restenosis of the mid LAD between the two stents, the proximal and  the mid stents. 2.  Nonobstructive disease in the left circ and RCA. 3.  No complication. RECOMMENDATIONS:  At this point, case discussed with Dr. Kaity Durán. Plan is  to proceed with angioplasty and stenting of the LAD which will be  dictated in separate report. The patient would benefit from aggressive  cholesterol management, consideration of Repatha or increasing the dose  of statins is the first step with intensifying medical therapy and  followup accordingly.         Marco Antonio Jones M.D.    D: 11/09/2022 14:10:54       T: 11/09/2022 14:13:10     [id number]  Job#: [id number]     Doc#: [id number]    CC:

## 2022-11-09 NOTE — H&P
[facility]  Sedation/Analgesia History & Physical    Pt Name: Manuel Samayoa  Account number: [de-identified]  MRN: [id number]  YOB: 1950  Provider Performing Procedure: Azam Lopez MD MD [facility]  Primary Care Physician: Cristina Gonzalez MD  Date: 11/9/2022    PRE-PROCEDURE    Code Status: FULL CODE  Brief History/Pre-Procedure Diagnosis:   Angina    LAD stent      Consent: : I have discussed with the patient risks, benefits, and alternatives (and relevant risks, benefits, and side effects related to alternatives or not receiving care), and likelihood of the success. The patient and/or representative appear to understand and agree to proceed. The discussion encompasses risks, benefits, and side effects related to the alternatives and the risks related to not receiving the proposed care, treatment, and services. MEDICAL HISTORY  [x]ASHD/ANGINA/MI/CHF   [x]Hypertension  []Diabetes  []Hyperlipidemia  []Smoking  []Family Hx of ASHD  []Additional information:       has a past medical history of Actinic keratoses, Angina effort, Arthritis, ASHD (arteriosclerotic heart disease), Carotid disease, bilateral ([address] Colon polyps, Hypercholesteremia, Narcolepsy, Rotator cuff (capsule) sprain, Tobacco abuse, and Unspecified sleep apnea. SURGICAL HISTORY   has a past surgical history that includes Coronary angioplasty with stent (7/05); Rotator cuff repair; Appendectomy; Tonsillectomy; Colonoscopy (2007, ); Rotator cuff repair (Left, 2015); Hemorrhoid surgery (09/2016); Cataract removal (Bilateral, 2016); and Coronary angioplasty with stent (11/2017).   Additional information:       ALLERGIES   Allergies as of 11/09/2022 - Fully Reviewed 10/26/2022   Allergen Reaction Noted    Adderall [amphetamine-dextroamphetamine] Shortness Of Breath 11/03/2021    Flomax [tamsulosin] Other (See Comments) 11/03/2021    Metoprolol Other (See Comments) 04/21/2022    Other Other (See Comments) 11/03/2021    Polysporin [bacitracin-polymyxin b] Hives 12/21/2020    Provigil [modafinil] Other (See Comments) 11/03/2021     Additional information:       MEDICATIONS   Aspirin  [x] 81 mg  [] 325 mg  [] None  Antiplatelet drug therapy use last 5 days  []No []Yes  Coumadin Use Last 5 Days []No []Yes  Other anticoagulant use last 5 days  []No []Yes    Current Facility-Administered Medications:     0.9 % sodium chloride infusion, , IntraVENous, Continuous, Ida Cuenca PA-C    sodium chloride flush 0.9 % injection 5-40 mL, 5-40 mL, IntraVENous, 2 times per day, Sin Manriquez PA-C    sodium chloride flush 0.9 % injection 5-40 mL, 5-40 mL, IntraVENous, PRN, Sin Manriquez PA-C    aspirin tablet 325 mg, 325 mg, Oral, Once, Sin Manriquez PA-C    nitroGLYCERIN (NITROSTAT) SL tablet 0.4 mg, 0.4 mg, SubLINGual, Q5 Min PRN, Sin Manriquez PA-C  Prior to Admission medications    Medication Sig Start Date End Date Taking? Authorizing Provider   apixaban (ELIQUIS) 5 MG TABS tablet Take 1 tablet by mouth 2 times daily 10/5/22   Mercedes Thompson MD   silodosin (RAPAFLO) 8 MG CAPS Take 1 capsule by mouth every evening 10/5/22   NEHA Mitchell CNP   methylphenidate (RITALIN) 20 MG tablet Take 1 tablet by mouth 3 times daily for 90 days. 9/19/22 12/18/22  Padmaja Arias PA-C   fluocinonide (LIDEX) 0.05 % gel Apply topically 2 times daily.  8/11/22   Naye Lieberman MD   atorvastatin (LIPITOR) 40 MG tablet TAKE 1 TABLET BY MOUTH EVERY DAY 6/14/22   Naye Lieberman MD   Solriamfetol HCl (SUNOSI) 150 MG TABS Take 150 mg by mouth daily 5/23/22   Jessica Burgos PA-C   loratadine (CLARITIN) 10 MG capsule Take 10 mg by mouth daily    Historical Provider, MD   ipratropium (ATROVENT) 0.06 % nasal spray 2 sprays by Each Nostril route 3 times daily 2/18/22   Naye Lieberman MD   meclizine (ANTIVERT) 25 MG tablet Take 1 tablet by mouth every 8 hours as needed for Dizziness 1/20/22 Logan Sanders MD   ketoconazole (NIZORAL) 2 % cream Apply topically daily. Am and  pm 8/24/21   Logan Sanders MD   hydrocortisone 1 % cream Apply topically 2 times daily Apply topically 2 times daily. Historical Provider, MD   ibuprofen (ADVIL;MOTRIN) 200 MG tablet Take 200 mg by mouth every 6 hours as needed for Pain    Historical Provider, MD   Multiple Vitamins-Minerals (ICAPS AREDS 2 PO) Take 1 capsule by mouth 2 times daily    Historical Provider, MD   aspirin 81 MG chewable tablet Take 81 mg by mouth daily. Historical Provider, MD     Additional information:       VITAL SIGNS   There were no vitals filed for this visit. PHYSICAL:   General: No acute distress  HEENT:  Unremarkable for age  Neck: without increased JVD, carotid pulses 2+ bilaterally without bruits  Heart: RRR, S1 & S2 WNL, S4 gallop, without murmurs or rubs    Lungs: Clear to auscultation    Abdomen: BS present, without HSM, masses, or tenderness    Extremities: without C,C,E.  Pulses 2+ bilaterally  Mental Status: Alert & Oriented        PLANNED PROCEDURE   [x]Cath  []PCI                []Pacemaker/AICD  []TAMRA             []Cardioversion []Peripheral angiography/PTA  []Other:      SEDATION  Planned agent:[x]Midazolam []Meperidine [x]Sublimaze []Morphine  []Diazepam  []Other:     ASA Classification:  []1 [x]2 []3 []4 []5  Class 1: A normal healthy patient  Class 2: Pt with mild to moderate systemic disease  Class 3: Severe systemic disease or disturbance  Class 4: Severe systemic disorders that are already life threatening. Class 5: Moribund pt with little chances of survival, for more than 24 hours. Mallampati I Airway Classification:   []1 [x]2 []3 []4    [x]Pre-procedure diagnostic studies complete and results available. Comment:    [x]Previous sedation/anesthesia experiences assessed. Comment:    [x]The patient is an appropriate candidate to undergo the planned procedure sedation and anesthesia.  (Refer to nursing sedation/analgesia documentation record)  [x]Formulation and discussion of sedation/procedure plan, risks, and expectations with patient and/or responsible adult completed. [x]Patient examined immediately prior to the procedure.  (Refer to nursing sedation/analgesia documentation record)    Crystal Lundberg MD MD [facility]  Electronically signed 11/9/2022 at 10:41 AM

## 2022-11-09 NOTE — BRIEF OP NOTE
[address] [id number]  Sedation/Analgesia Post Sedation Record    Pt Name: Malena Doan  Account number: [de-identified]  MRN: [id number]  YOB: 1950  Procedure Performed By: MD MD Holly Hammer, [address]  Primary Care Physician: Aleshia Zelaya MD  Date: 11/9/2022    POST-PROCEDURE    Physicians/Assistants: MD MD Holly Hammer, LUCIA    Procedure Performed:PCI      Sedation/Anesthesia: Versed/ Fentanyl and 2% xylocaine local anesthesia. Estimated Blood Loss: < 50 ml. Specimens Removed: None         Disposition of Specimen: N/A        Complications: No Immediate Complications.        Post-procedure Diagnosis/Findings:       PCI of the LAD x 1 MD MD Holly Gray, LUCIA  Electronically signed 11/9/2022 at 1:26 PM  Interventional Cardiology

## 2022-11-09 NOTE — DISCHARGE INSTRUCTIONS
Follow with Dr. Ila Berry 2 weeks - sp PCI     stop aspirin 81 mg in 2 weeks - continue eliquis and plavix   -- pt going on vacation nov 17- dec 5         For Radial approach:  Cover site with dressing or bandaid for 5 days and change daily. Dressing from hospital  should be left intact until next morning. No lotions, creams or powder to site. Minimize movement of wrist for 24 hours, may use armboard. No lifting over 5 pounds with involved extremity for 7 days. May shower in 24 hours. Do not immerse arm in water for 5 days, example bathtub,dish washing,  hot tub, swimming pool. Call physician for any signs bleeding, swelling, pain, redness, coolness of extremity. If  bleeding or swelling occurs apply firm direct pressure to site for 15 minutes and call 911/ physician. No driving for 7 days. Always wash hands before touching incision area. For Groin approach:    Remove  dressing in 24 hours, gently clean site with soap and water, pat dry, and apply bandaid daily for 5 days. Keep site clean and dry. Do not apply any creams, lotions, or powders to puncture site. May shower in 24 hours. Do not submerse site in water (no tub baths, swimming, or whirlpool) for 5 days. Take it easy for 7days. Avoid heaving lifting, pushing, pulling or straining. Monitor site for bleeding, drainage, or swelling. Monitor for signs of infection which include:  redness, drainage, soreness, or temp greater than 101 F. Notify doctor of any abnormal findings as listed. If bleeding occurs, hold firm pressure at site and call 911. Discharge Instructions for Coronary Stenting     In coronary stenting a mesh, metal tube is placed in an artery in the heart. The tube is called a stent. It helps keep the artery open. It is placed after an angioplasty done to clear arteries of blockages. One type of stent is called a drug-eluting stent. It is coated with a medication that is slowly released.  The medicine helps decrease the rate of reblockage in the artery. Depending on your condition, you may go home after the procedure or stay for up to two days in the hospital.   Steps to [address] the area with soap and water daily. Replace it with a new bandage after cleaning. Ask your doctor about when it is safe to shower, bathe, or soak in water. If there is any bleeding at the catheter site, apply firm pressure with your hands until the bleeding stops. Diet    Follow the diet as outlined by your doctor. This will probably include a heart healthy diet . Physical Activity    Ask your doctor when you will be able to return to work. Do not drive unless your doctor has given you permission to do so. Avoid heavy lifting, physically demanding activities, and sexual activity for 5-7 days. Do not sit for long periods of time. Try to change positions frequently. Medications    You may have stopped taking medications before the procedure. Ask your doctor when you can resume taking them. Medications that may have been stopped include:   Anti-inflammatory drugs (eg, ibuprofen )   Blood thinners like warfarin (Coumadin)   clopidogrel (Plavix)   Take blood-thinning medications as prescribed by your doctor. This may be aspirin or any of the other ones listed above. Do not take metformin (Glucophage) or glyburide and metformin (Glucovance) for 48 hours after the procedure or until your doctor says it is okay. If you are taking medications, follow these general guidelines:   Take your medication as directed. Do not change the amount or the schedule. Do not stop taking them without talking to your doctor. Do not share them. Know what the results and side effects. Report them to your doctor. Some drugs can be dangerous when mixed. Talk to a doctor or pharmacist if you are taking more than one drug. This includes over-the-counter medication and herb or dietary supplements.    Plan ahead for refills so you don't run out. Lifestyle Changes    You and your doctor will plan proper lifestyle changes that will help you recover. Some things to keep in mind include:   Avoid MRI scans for four weeks after stent placement, unless directed by your cardiologist. Always inform new doctors or other medical personnel that you have a coronary stent in place. Maintain an appropriate exercise regime. If you smoke, quit . Try to reduce stress in your life. Make changes to your diet to help prevent a recurrence of plaque build-up. This includes eating a diet low in saturated fat . Make your diet high in fruits, vegetables , grains, and fish. You may want to see a registered dietitian for help in making these changes. Your artery should be more open. This will allow better blood flow to feed the heart muscle. It may mean that you'll no longer have the chest pain. Your tolerance for exercise will increase. Sometimes the procedure isn't successful or the artery narrows again. You may require repeat angioplasty or coronary artery bypass grafting (CABG). Follow-up   Your doctor may recommend follow-up appointments. It is important to go to them. Call Your Doctor If Any of the Following Occurs   It is important for you to check your recovery. Alert your doctor to any problems.  If any of the following occur, call your doctor:   Signs of infection, including fever and chills   Redness, swelling, increasing pain, excessive bleeding, or any discharge from the incision site   Your arm or leg becomes painful, blue, cold, numb, tingly, swollen, or increasingly bruised   Nausea and/or vomiting that you can't control with the medications you were given after surgery, or which persist for more than two days after discharge from the hospital   Pain that you can't control with the medications you've been given   Pain, burning, urgency or frequency of urination, or persistent bleeding in the urine   Cough, shortness of breath, or chest pain   Joint pain, fatigue, stiffness, rash, or other new symptoms   Extreme sweating   In case of an emergency,  CALL 911  .       angioplasty/stent information given to patient. SEDATION/ANALGESIA INFORMATION/HOME GOING ADVICE    SEDATION / ANALGESIA INFORMATION / HOME GOING ADVICE  You have received the sedation/analgesia medication during your visit     Sedation/analgesia is used during short medical procedures under controlled supervision. The medication will produce a strong relaxation. You will be able to hear, speak and follow instructions, but your memory and alertness will be decreased. You will be able to swallow and breathe on your own. During sedation/analgesia your blood pressure, heart and breathing will be watched closely. After the procedure, you may not remember what was said or done. You may have the following effects from the medication. \"           Drowsiness, dizziness, sleepiness or confusion. \"           Difficulty remembering or delayed reaction times. \"           Loss of fine muscle control or difficulty with your balance especially while walking. \"           Difficulty focusing or blurred vision. You may not be aware of slight changes in your behavior and/or your reaction time because of the medication used during the procedure. Therefore you should follow these instructions. \"           Have someone responsible help you with your care. \"           Do not drive for 24 hours. \"           Do not operate equipment for 24 hours (lawnmowers, power tools, kitchen accessories, stove). \"           Do not drink any alcoholic beverages for a minimum of 24 hours. \"           Do not make important personal, legal or business decisions for 24 hours. \"           You may experience dizziness or lightheadedness. Move slowly and carefully, do not make sudden position changes. \"           Drink extra amounts of fluids today.   \"           Increase your diet as tolerated (unless you have received specific instructions from your doctor). \"           If you feel nauseated, continue with liquids until the nausea is gone. \"           Notify your physician if you have not urinated within 8 hours after the procedure. \"           Resume your medications unless otherwise instructed. Contact your physician if you have any questions or concerns.      IF YOU REPORT TO AN EMERGENCY ROOM, DOCTOR'S OFFICE OR HOSPITAL WITHIN 24 HOURS AFTER YOUR PROCEDURE, BRING THIS SHEET AND YOUR AFTER VISIT SUMMARY WITH YOU AND GIVE IT TO THE PHYSICIAN OR NURSE ATTENDING YOU.

## 2022-11-10 VITALS
OXYGEN SATURATION: 98 % | BODY MASS INDEX: 22.44 KG/M2 | RESPIRATION RATE: 16 BRPM | HEART RATE: 77 BPM | TEMPERATURE: 98.1 F | DIASTOLIC BLOOD PRESSURE: 64 MMHG | WEIGHT: 143 LBS | SYSTOLIC BLOOD PRESSURE: 110 MMHG | HEIGHT: 67 IN

## 2022-11-10 LAB
ANION GAP SERPL CALCULATED.3IONS-SCNC: 9 MEQ/L (ref 8–16)
BUN BLDV-MCNC: 17 MG/DL (ref 7–22)
CALCIUM SERPL-MCNC: 8.7 MG/DL (ref 8.5–10.5)
CHLORIDE BLD-SCNC: 106 MEQ/L (ref 98–111)
CO2: 26 MEQ/L (ref 23–33)
CREAT SERPL-MCNC: 0.9 MG/DL (ref 0.4–1.2)
EKG ATRIAL RATE: 66 BPM
EKG P AXIS: 39 DEGREES
EKG P-R INTERVAL: 194 MS
EKG Q-T INTERVAL: 374 MS
EKG QRS DURATION: 86 MS
EKG QTC CALCULATION (BAZETT): 392 MS
EKG R AXIS: 80 DEGREES
EKG T AXIS: 53 DEGREES
EKG VENTRICULAR RATE: 66 BPM
ERYTHROCYTE [DISTWIDTH] IN BLOOD BY AUTOMATED COUNT: 12.9 % (ref 11.5–14.5)
ERYTHROCYTE [DISTWIDTH] IN BLOOD BY AUTOMATED COUNT: 44.1 FL (ref 35–45)
GFR SERPL CREATININE-BSD FRML MDRD: > 60 ML/MIN/1.73M2
GLUCOSE BLD-MCNC: 100 MG/DL (ref 70–108)
HCT VFR BLD CALC: 44.5 % (ref 42–52)
HEMOGLOBIN: 15.1 GM/DL (ref 14–18)
MCH RBC QN AUTO: 32.3 PG (ref 26–33)
MCHC RBC AUTO-ENTMCNC: 33.9 GM/DL (ref 32.2–35.5)
MCV RBC AUTO: 95.1 FL (ref 80–94)
PLATELET # BLD: 188 THOU/MM3 (ref 130–400)
PMV BLD AUTO: 8.8 FL (ref 9.4–12.4)
POTASSIUM SERPL-SCNC: 4.5 MEQ/L (ref 3.5–5.2)
RBC # BLD: 4.68 MILL/MM3 (ref 4.7–6.1)
SODIUM BLD-SCNC: 141 MEQ/L (ref 135–145)
WBC # BLD: 8.9 THOU/MM3 (ref 4.8–10.8)

## 2022-11-10 PROCEDURE — 99217 PR OBSERVATION CARE DISCHARGE MANAGEMENT: CPT | Performed by: NURSE PRACTITIONER

## 2022-11-10 PROCEDURE — 36415 COLL VENOUS BLD VENIPUNCTURE: CPT

## 2022-11-10 PROCEDURE — 80048 BASIC METABOLIC PNL TOTAL CA: CPT

## 2022-11-10 PROCEDURE — 93010 ELECTROCARDIOGRAM REPORT: CPT | Performed by: NUCLEAR MEDICINE

## 2022-11-10 PROCEDURE — G0378 HOSPITAL OBSERVATION PER HR: HCPCS

## 2022-11-10 PROCEDURE — 2580000003 HC RX 258: Performed by: INTERNAL MEDICINE

## 2022-11-10 PROCEDURE — 6370000000 HC RX 637 (ALT 250 FOR IP): Performed by: INTERNAL MEDICINE

## 2022-11-10 PROCEDURE — 85027 COMPLETE CBC AUTOMATED: CPT

## 2022-11-10 RX ORDER — CLOPIDOGREL BISULFATE 75 MG/1
75 TABLET ORAL DAILY
Qty: 30 TABLET | Refills: 3 | Status: SHIPPED | OUTPATIENT
Start: 2022-11-11

## 2022-11-10 RX ORDER — NITROGLYCERIN 0.4 MG/1
0.4 TABLET SUBLINGUAL EVERY 5 MIN PRN
Qty: 25 TABLET | Refills: 1 | Status: SHIPPED | OUTPATIENT
Start: 2022-11-10

## 2022-11-10 RX ORDER — AMLODIPINE BESYLATE 2.5 MG/1
2.5 TABLET ORAL DAILY
Qty: 30 TABLET | Refills: 3 | Status: SHIPPED | OUTPATIENT
Start: 2022-11-10

## 2022-11-10 RX ORDER — AMLODIPINE BESYLATE 2.5 MG/1
2.5 TABLET ORAL DAILY
Status: DISCONTINUED | OUTPATIENT
Start: 2022-11-10 | End: 2022-11-10 | Stop reason: HOSPADM

## 2022-11-10 RX ADMIN — METHYLPHENIDATE HYDROCHLORIDE 20 MG: 10 TABLET ORAL at 08:00

## 2022-11-10 RX ADMIN — CLOPIDOGREL BISULFATE 75 MG: 75 TABLET ORAL at 07:59

## 2022-11-10 RX ADMIN — SODIUM CHLORIDE, PRESERVATIVE FREE 10 ML: 5 INJECTION INTRAVENOUS at 07:59

## 2022-11-10 ASSESSMENT — PAIN SCALES - GENERAL: PAINLEVEL_OUTOF10: 0

## 2022-11-10 NOTE — FLOWSHEET NOTE
Discharge teaching and instructions for diagnosis/procedure of heart cath completed with patient using teachback method. AVS reviewed. Printed prescriptions given to patient. Patient voiced understanding regarding prescriptions, follow up appointments, and care of self at home. Discharged in a wheelchair to  independent living per family.

## 2022-11-10 NOTE — PROGRESS NOTES
Inpatient Cardiac Rehabilitation Consult    Received consult for Phase II Cardiac Rehabilitation. Patient needs cardiac rehab due to PCI on 11/9/22. Importance of Cardiac Rehab discussed with patient. Reviewed cardiac rehab class times. Patient questions answered. Patient stated he does not want the education part. We will contact patient at home to schedule evaluation appointment. Cardiac Rehab brochure given.

## 2022-11-10 NOTE — PLAN OF CARE
Problem: Discharge Planning  Goal: Discharge to home or other facility with appropriate resources  11/10/2022 0851 by Ren Epstein RN  Outcome: Completed  Flowsheets (Taken 11/9/2022 1125 by Alfredo Garcia RN)  Discharge to home or other facility with appropriate resources:   Identify barriers to discharge with patient and caregiver   Identify discharge learning needs (meds, wound care, etc)  Note: Discharge today on plavix     Problem: ABCDS Injury Assessment  Goal: Absence of physical injury  11/10/2022 0851 by Ren Epstein RN  Outcome: Completed  Flowsheets (Taken 11/10/2022 5693)  Absence of Physical Injury: Implement safety measures based on patient assessment  Note: Indep in room. Call light is with in reach. Care plan reviewed with patient . Patient verbalize understanding of the plan of care and contribute to goal setting.

## 2022-11-10 NOTE — PLAN OF CARE
Problem: ABCDS Injury Assessment  Goal: Absence of physical injury  Outcome: Progressing  Note: Patient free from injury this shift     Problem: Discharge Planning  Goal: Discharge to home or other facility with appropriate resources  Note: Discharge needs are assessed daily   Care plan reviewed with patient. Patient verbalizes understanding of the plan of care and contribute to goal setting.

## 2022-11-10 NOTE — CARE COORDINATION
11/10/22, 8:17 AM EST      DISCHARGE PLANNING EVALUATION    Felicitas Vickers  Admitted: 11/9/2022  Hospital Day: 0    [facility]: [facility] Reason for admit: Shortness of breath [R06.02]  Atherosclerotic heart disease of native coronary artery with unspecified angina pectoris [I25.119]  Essential (primary) hypertension [I10]  Status post angioplasty with stent [Z95.820]    Past Medical History:   Diagnosis Date    Actinic keratoses     Angina effort      Replacing deprecated diagnoses    Arthritis     hands    ASHD (arteriosclerotic heart disease)     Carotid disease, bilateral ([address] 2017    Colon polyps 05/2007    Hypercholesteremia 07/1997    Narcolepsy     Rotator cuff (capsule) sprain     Tobacco abuse     Unspecified sleep apnea     cpap       Procedure: 11-9-22 Cardiac Cath with PCI  Barriers to Discharge: Admitted for Cath with PCI. BUN and Creat normal today. Anticipate discharge today. PCP: Salma Mathur MD    Readmission Risk              Risk of Unplanned Readmission:  0         Patient's Healthcare Decision Maker: Legal Next of Kin    Patient Goals/Plan/Treatment Preferences: Met with pt and spouse this morning as they were preparing for discharge. Pt is self sufficient and active. No home services. He has a C-Pap. He has a PCP, he drives and no issue getting meds. He is insured. Transportation/Food Security/Housekeeping Addressed: No issues identified. 11/10/22, 10:27 AM EST    Patient goals/plan/ treatment preferences discussed by  and . Patient goals/plan/ treatment preferences reviewed with patient/ family. Patient/ family verbalize understanding of discharge plan and are in agreement with goal/plan/treatment preferences. Understanding was demonstrated using the teach back method.   AVS provided by RN at time of discharge, which includes all necessary medical information pertaining to the patients current course of illness, treatment, post-discharge goals of care, and treatment preferences. Services At/After Discharge: None        Pt discharged home with spouse and no new services.

## 2022-11-10 NOTE — PROCEDURES
[address] [id number]                            CARDIAC CATHETERIZATION    PATIENT NAME: Dipika Rangel                   :        1950  MED REC NO:   [id number]                           ROOM:       0024  ACCOUNT NO:   [de-identified]                           ADMIT DATE: 2022  PROVIDER:     Hill Romero MD    DATE OF PROCEDURE:  2022    CARDIAC CATHETERIZATION    INDICATION:  CCS class III angina, on optimal medical therapy with  abnormal stress test.    DESCRIPTION OF PROCEDURE:  After informed consent was obtained from the  patient, he was brought to the cardiac catheterization laboratory and  prepped in sterile fashion. Right radial artery was chosen as the  primary point of access. Pre-procedure timeout was completed. The  access, diagnostic catheter was used, as diagnostic coronary angiography  was performed by Dr. Brendan Peña. Please see his note for further  details. In brief, the patient had previously placed LAD stents. In  between those stents, there was about 70-80% stenosis. Plan was to  proceed with intervention of the LAD based on clinical findings. I exchanged out for a 6-[place] EBU 3.75 guiding catheter. I cannulated  the left main without any complication. Heparin IV was given. ACT was  confirmed to be above 250 seconds. I wired the lesion using a  Runthrough wire. I then direct stented the lesion using a 3.0 x 34  Resolute [place] ALEXIS at 12 atmospheres. I then postdilated the stent with  a 3.0 x 15 NC balloon from distal to proximal up to 24 atmospheres. Post-PCI angiography demonstrated NEHEMIAH-3 flow without perforation,  dissection, distal embolization, side branch loss, or guide or  guidewire-induced trauma. All equipment was removed from the patient. The patient tolerated the procedure well. IMMEDIATE COMPLICATIONS:  None. MEDICATIONS:  See EMR.     ACCESS:  Vasc Band used for hemostasis. ESTIMATED BLOOD LOSS:  Less than 50 mL. SUMMARY:  Successful PCI of LAD with one drug-eluting stent. PLAN:  1. Bedrest.  2.  Optimal medical therapy. 3.  Risk factor management. 4.  Routine access site care. 5.  DAPT. 6.  Overnight observation. 7.  Restart home medications. 8.  Cardiac rehab.  8.  Follow up with Dr. Anupama Shore in two to four weeks postprocedure. All the above was explained to the patient and the patient's family. They are agreeable and amenable to the above plan.         Lydia Betts MD    D: 11/10/2022 5:08:22       T: 11/10/2022 6:04:11     [email or web address]  Job#: [id number]     Doc#: [id number]    CC:

## 2022-11-10 NOTE — PROGRESS NOTES
Cardiology Progress Note      Patient:  Thang Pham  YOB: 1950  MRN: [id number]   Acct: [de-identified]  Admit Date:  11/9/2022  Primary Cardiologist: Ronald Yang MD    Chief Complaint: pt presented for OP elective cardiac cath     Subjective (Events in last 24 hours): pt in bed   He states he feels less sob already - he has ambulated int he hallway without problems     Tele SR no ectopy   VSS    RT radial cath site - band-aid in place - no ecchymosis or hematoma - PPP- neurovascular check WNL     Objective:   /64   Pulse 77   Temp 98.1 °F (36.7 °C) (Oral)   Resp 16   Ht 5' 7\" (1.702 m)   Wt 143 lb (64.9 kg)   SpO2 98%   BMI 22.40 kg/m²        TELEMETRY: SR no ectopy     Physical Exam:  General Appearance: alert and oriented to person, place and time, in no acute distress  Cardiovascular: normal rate, regular rhythm, normal S1 and S2, no murmurs, rubs, clicks, or gallops, distal pulses intact,   Pulmonary/Chest: clear to auscultation bilaterally- no wheezes, rales or rhonchi, normal air movement, no respiratory distress  Abdomen: soft, non-tender, non-distended, normal bowel sounds, no masses Extremities: no cyanosis, clubbing or edema, pulses present   Skin: warm and dry, no rash or erythema  Musculoskeletal: normal range of motion, no joint swelling, deformity or tenderness  Neurological: alert, oriented, normal speech, no focal findings or movement disorder noted    Medications:    sodium chloride flush  5-40 mL IntraVENous 2 times per day    aspirin  325 mg Oral Once    sodium chloride flush  5-40 mL IntraVENous 2 times per day    aspirin  81 mg Oral Daily    clopidogrel  75 mg Oral Daily    ipratropium  2 spray Each Nostril TID    Solriamfetol HCl  150 mg Oral Daily    atorvastatin  40 mg Oral Daily    methylphenidate  20 mg Oral TID      sodium chloride 75 mL/hr at 11/09/22 1149    sodium chloride       sodium chloride flush, 5-40 mL, PRN  nitroGLYCERIN, 0.4 mg, Q5 Min PRN  sodium chloride flush, 5-40 mL, PRN  sodium chloride, , PRN  acetaminophen, 650 mg, Q4H PRN  ibuprofen, 200 mg, Q6H PRN  meclizine, 25 mg, Q8H PRN        Diagnostics:  Echo:   Electronically signed by Natasha Hartley MD (Interpreting   physician) on 07/15/2021 at 07:25 PM   ----------------------------------------------------------------      Findings      Mitral Valve   The mitral valve structure was normal with normal leaflet separation. DOPPLER: The transmitral velocity was within the normal range with no   evidence for mitral stenosis. There was no evidence of mitral   regurgitation. Aortic Valve   The aortic valve was trileaflet with normal thickness and cuspal   separation. DOPPLER: Transaortic velocity was within the normal range with   no evidence of aortic stenosis. There was no evidence of aortic   regurgitation. Tricuspid Valve   Trivial tricuspid regurgitation visualized. Pulmonic Valve   The pulmonic valve was not well visualized . Trivial pulmonic regurgitation visualized. Left Atrium   Left atrial size was normal.      Left Ventricle   Ejection fraction is visually estimated at 55%. Overall left ventricular function is normal.      Right Atrium   Right atrial size was normal.      Right Ventricle   The right ventricular size was normal with normal systolic function and   wall thickness. Pericardial Effusion   The pericardium was normal in appearance with no evidence of a pericardial   effusion. Pleural Effusion   No evidence of pleural effusion. Aorta / Great Vessels   -Aortic root dimension within normal limits.   -The Pulmonary artery is within normal limits. -IVC size is within normal limits with normal respiratory phasic changes. Left Heart Cath:   HEMODYNAMIC RESULTS:  Left ventriculogram:  End-diastolic pressure was  12 mmHg. No significant change before and after contrast injection.   No  significant gradient across the aortic valve to signify aortic stenosis. Left ventricular function was globally within normal limits. EF 55%. CORONARY ARTERIOGRAM RESULTS:  1. Left main is patent, gives rise to left anterior descending and left  circumflex artery. 2.  Left anterior descending artery has a proximal stent which is  patent; however, distal site of the stent seems to be significantly  stenosed, about 70-80%. There is a distal stent which is patent. 3.  Left circumflex artery has diffuse nonobstructive mild luminal  irregularity. 4.  Right coronary artery has diffuse 30-40% stenosis with luminal  irregularity, otherwise no critical stenosis. CONCLUSION:  1. Restenosis of the mid LAD between the two stents, the proximal and  the mid stents. 2.  Nonobstructive disease in the left circ and RCA. 3.  No complication. RECOMMENDATIONS:  At this point, case discussed with Dr. Cheryle Miranda. Plan is  to proceed with angioplasty and stenting of the LAD which will be  dictated in separate report. The patient would benefit from aggressive  cholesterol management, consideration of Repatha or increasing the dose  of statins is the first step with intensifying medical therapy and  followup accordingly. Raulito Melendez M.D.     D: 11/09/2022       SUMMARY:  Successful PCI of LAD with one drug-eluting stent. PLAN:  1. Bedrest.  2.  Optimal medical therapy. 3.  Risk factor management. 4.  Routine access site care. 5.  DAPT. 6.  Overnight observation. 7.  Restart home medications. 8.  Cardiac rehab.  8.  Follow up with Dr. Marla Yates in two to four weeks postprocedure. All the above was explained to the patient and the patient's family. They are agreeable and amenable to the above plan. Araceli Yuen MD     D: 11/10/2022     Lab Data:    Cardiac Enzymes:  No results for input(s): CKTOTAL, CKMB, CKMBINDEX, TROPONINI in the last 72 hours.     CBC:   Lab Results   Component Value Date/Time    WBC 8.9 11/10/2022 05:57 AM RBC 4.68 11/10/2022 05:57 AM    RBC 5.01 10/17/2022 07:45 AM    HGB 15.1 11/10/2022 05:57 AM    HCT 44.5 11/10/2022 05:57 AM     11/10/2022 05:57 AM       CMP:    Lab Results   Component Value Date/Time     11/10/2022 05:57 AM    K 4.5 11/10/2022 05:57 AM    K 4.0 08/06/2021 12:20 AM     11/10/2022 05:57 AM    CO2 26 11/10/2022 05:57 AM    BUN 17 11/10/2022 05:57 AM    CREATININE 0.9 11/10/2022 05:57 AM    LABGLOM >60 11/09/2022 06:11 PM    GLUCOSE 100 11/10/2022 05:57 AM    GLUCOSE 109 10/17/2022 07:45 AM    CALCIUM 8.7 11/10/2022 05:57 AM       Hepatic Function Panel:    Lab Results   Component Value Date/Time    ALKPHOS 125 10/17/2022 07:45 AM    ALKPHOS 75 03/02/2017 07:28 AM    ALT 22 10/17/2022 07:45 AM    AST 19 10/17/2022 07:45 AM    PROT 6.5 10/17/2022 07:45 AM    BILITOT 0.9 10/17/2022 07:45 AM    BILIDIR 0.3 02/25/2013 08:16 AM    LABALBU 4.3 10/17/2022 07:45 AM    LABALBU 4.4 02/17/2012 09:05 AM       Magnesium:  No results found for: MG    PT/INR:    Lab Results   Component Value Date/Time    INR 1.00 11/09/2022 10:55 AM       HgBA1c:  No results found for: LABA1C    FLP:    Lab Results   Component Value Date/Time    TRIG 46 11/09/2022 10:55 AM    HDL 70 11/09/2022 10:55 AM    LDLCALC 90 11/09/2022 10:55 AM    LABVLDL 11 10/17/2022 07:45 AM       TSH:    Lab Results   Component Value Date/Time    TSH 2.31 10/17/2022 07:45 AM         Assessment:    Sp op elective cardiac cath 11/9/22  Successful PCI of LAD with one drug-eluting stent.     HLP   LDL 90    Hx AFB - on eliquis -- maintained SR here       Plan:  Home today   Follow as OP 2 weeks with Aric et al... sp PCI     Patient and provider goals: Feel better and have more energy, Begin Cardiac Rehab, and Start exercise program       Cardiac Rehab: Yes    Discharge condition: stable  Disposition: Home  Time spent on discharge: greater than 30 minutes       Discharge Medications for PCI/MI (performed or attempted):   ASA: yes  Statin: yes  P2Y12 Inhibitor: yes  Beta Blocker: no -- intolerance - add CCB   ACE / ARB:no lower bp   Nitro SL: yes      Discharge Medications for ICD, Cardiomyopathy, CHF:  Beta Blocker: no  ACE Inhibitor/ARB: no           Electronically signed by NEHA Pierce CNP on 11/10/2022 at 8:57 AM

## 2022-11-11 ENCOUNTER — CARE COORDINATION (OUTPATIENT)
Dept: FAMILY MEDICINE CLINIC | Age: 72
End: 2022-11-11

## 2022-11-11 NOTE — CARE COORDINATION
Care Transitions Initial Follow Up Call    Call within 2 business days of discharge: Yes     Patient: Sarah Roberts Patient : 1950 MRN: [id number]    Last Discharge  Street       Date Complaint Diagnosis Description Type Department Provider    22   Admission (Discharged) Bill Perez MD            RARS: No data recorded     Spoke with: Mirian Valencia    Discharge department/facility: [address] [place] [address]    Non-face-to-face services provided:  Scheduled appointment with PCP-yes  Scheduled appointment with Specialist-yes  Obtained and reviewed discharge summary and/or continuity of care documents  Reviewed and followed up on pending diagnostic tests and treatments-yes  Communication with specialists who will assume or re-assume care of the patient's system-specific problems-yes  Education of patient/family/caregiver/guardian to support self-management-yes  Assessment and support for treatment adherence and medication management-yes  Establishment or re-establishment of referrals-yes  Assistance in accessing community resources-yes    Follow Up  Future Appointments   Date Time Provider Jet Celestini   11/15/2022  2:00 PM Khai Bui MD [address],[facility] [address] [facility]   2022  1:45 PM Tosin Maldonado MD [address]   2022 10:00 AM TANI Noyola [facility] [address]   2022  9:50 AM Khai Bui MD [facility]   4/10/2023 10:00 AM [facility] [address]   2023 10:45 AM MD OLAYINKA Rucker [facility] - Siobhan Dulce is feeling better. He has no home needs and medications were reconciled. He has a follow up appointment scheduled and also follow ups with specialists. He thanked me for calling to check on him. He is compliant with a low salt diet and has been watching it since his first stent in . I will follow up with him next week.     Luisa Everett RN

## 2022-11-15 ENCOUNTER — OFFICE VISIT (OUTPATIENT)
Dept: FAMILY MEDICINE CLINIC | Age: 72
End: 2022-11-15

## 2022-11-15 VITALS
DIASTOLIC BLOOD PRESSURE: 80 MMHG | RESPIRATION RATE: 16 BRPM | HEART RATE: 84 BPM | BODY MASS INDEX: 21.97 KG/M2 | WEIGHT: 140 LBS | SYSTOLIC BLOOD PRESSURE: 138 MMHG | HEIGHT: 67 IN

## 2022-11-15 DIAGNOSIS — R06.3 CENTRAL SLEEP APNEA DUE TO CHEYNE-STOKES RESPIRATION: ICD-10-CM

## 2022-11-15 DIAGNOSIS — G47.31 COMPLEX SLEEP APNEA SYNDROME: ICD-10-CM

## 2022-11-15 DIAGNOSIS — I25.10 ASHD (ARTERIOSCLEROTIC HEART DISEASE): ICD-10-CM

## 2022-11-15 DIAGNOSIS — N40.1 BPH WITH OBSTRUCTION/LOWER URINARY TRACT SYMPTOMS: ICD-10-CM

## 2022-11-15 DIAGNOSIS — I25.10 PRESENCE OF STENT IN CORONARY ARTERY IN PATIENT WITH CORONARY ARTERY DISEASE: ICD-10-CM

## 2022-11-15 DIAGNOSIS — I48.0 PAROXYSMAL ATRIAL FIBRILLATION (HCC): Primary | ICD-10-CM

## 2022-11-15 DIAGNOSIS — N13.8 BPH WITH OBSTRUCTION/LOWER URINARY TRACT SYMPTOMS: ICD-10-CM

## 2022-11-15 DIAGNOSIS — Z09 HOSPITAL DISCHARGE FOLLOW-UP: ICD-10-CM

## 2022-11-15 DIAGNOSIS — Z95.5 PRESENCE OF STENT IN CORONARY ARTERY IN PATIENT WITH CORONARY ARTERY DISEASE: ICD-10-CM

## 2022-11-15 DIAGNOSIS — I47.1 SVT (SUPRAVENTRICULAR TACHYCARDIA) (HCC): ICD-10-CM

## 2022-11-15 DIAGNOSIS — E78.00 HYPERCHOLESTEREMIA: ICD-10-CM

## 2022-11-15 DIAGNOSIS — G47.419 PRIMARY NARCOLEPSY WITHOUT CATAPLEXY: ICD-10-CM

## 2022-11-15 PROCEDURE — 99496 TRANSJ CARE MGMT HIGH F2F 7D: CPT | Performed by: FAMILY MEDICINE

## 2022-11-15 SDOH — ECONOMIC STABILITY: FOOD INSECURITY: WITHIN THE PAST 12 MONTHS, YOU WORRIED THAT YOUR FOOD WOULD RUN OUT BEFORE YOU GOT MONEY TO BUY MORE.: NEVER TRUE

## 2022-11-15 SDOH — ECONOMIC STABILITY: FOOD INSECURITY: WITHIN THE PAST 12 MONTHS, THE FOOD YOU BOUGHT JUST DIDN'T LAST AND YOU DIDN'T HAVE MONEY TO GET MORE.: NEVER TRUE

## 2022-11-15 ASSESSMENT — ENCOUNTER SYMPTOMS
CONSTIPATION: 0
NAUSEA: 0
BACK PAIN: 0
SHORTNESS OF BREATH: 0
TROUBLE SWALLOWING: 0
EYE PAIN: 0
SORE THROAT: 0
BLOOD IN STOOL: 0
CHEST TIGHTNESS: 0
ABDOMINAL PAIN: 0
COUGH: 0

## 2022-11-15 ASSESSMENT — SOCIAL DETERMINANTS OF HEALTH (SDOH): HOW HARD IS IT FOR YOU TO PAY FOR THE VERY BASICS LIKE FOOD, HOUSING, MEDICAL CARE, AND HEATING?: NOT HARD AT ALL

## 2022-11-15 NOTE — PROGRESS NOTES
Post-Discharge Transitional Care Follow Up      Soledad Heaton   YOB: 1950    Date of Office Visit:  11/15/2022  Date of Hospital Admission: 11/9/22  Date of Hospital Discharge: 11/10/22  Readmission Risk Score (high >=14%. Medium >=10%):No data recorded    Care management risk score Rising risk (score 2-5) and Complex Care (Scores >=6): No Risk Score On File     Non face to face  following discharge, date last encounter closed (first attempt may have been earlier): *No documented post hospital discharge outreach found in the last 14 days     Call initiated 2 business days of discharge: *No response recorded in the last 14 days   IMPRESSION      ICD-10-CM    1. Paroxysmal atrial fibrillation (HCC)  I48.0       2. Hypercholesteremia  E78.00       3. ASHD (arteriosclerotic heart disease)  I25.10       4. Central sleep apnea due to Cheyne-Santos respiration  R06.3       5. SVT (supraventricular tachycardia) (HCC)  I47.1       6. Complex sleep apnea syndrome  G47.31       7. 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 BPH with obstruction/lower urinary tract symptoms  N40.1     N13.8          Plan       ICD-10-CM    1. Paroxysmal atrial fibrillation (HCC)  I48.0       2. Hypercholesteremia  E78.00       3. ASHD (arteriosclerotic heart disease)  I25.10       4. Central sleep apnea due to Cheyne-Santos respiration  R06.3       5. SVT (supraventricular tachycardia) (HCC)  I47.1       6. Complex sleep apnea syndrome  G47.31       7. 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 BPH with obstruction/lower urinary tract symptoms  N40.1     N13.8       9. Presence of stent in coronary artery in patient with coronary artery disease  I25.10     Z95.5              plan    Current Outpatient Medications   Medication Sig Dispense Refill    nitroGLYCERIN (NITROSTAT) 0.4 MG SL tablet Place 1 tablet under the tongue every 5 minutes as needed for Chest pain up to max of 3 total doses.  If no relief after 1 dose, call 911. 25 tablet 1    amLODIPine (NORVASC) 2.5 MG tablet Take 1 tablet by mouth daily 30 tablet 3    clopidogrel (PLAVIX) 75 MG tablet Take 1 tablet by mouth daily 30 tablet 3    apixaban (ELIQUIS) 5 MG TABS tablet Take 1 tablet by mouth 2 times daily 180 tablet 0    silodosin (RAPAFLO) 8 MG CAPS Take 1 capsule by mouth every evening 30 capsule 11    methylphenidate (RITALIN) 20 MG tablet Take 1 tablet by mouth 3 times daily for 90 days. 270 tablet 0    fluocinonide (LIDEX) 0.05 % gel Apply topically 2 times daily. 15 g 1    atorvastatin (LIPITOR) 40 MG tablet TAKE 1 TABLET BY MOUTH EVERY DAY 90 tablet 1    Solriamfetol HCl (SUNOSI) 150 MG TABS Take 150 mg by mouth daily 90 tablet 1    loratadine (CLARITIN) 10 MG capsule Take 10 mg by mouth daily      ipratropium (ATROVENT) 0.06 % nasal spray 2 sprays by Each Nostril route 3 times daily 15 mL 1    meclizine (ANTIVERT) 25 MG tablet Take 1 tablet by mouth every 8 hours as needed for Dizziness 30 tablet 0    ketoconazole (NIZORAL) 2 % cream Apply topically daily. Am and  pm 30 g 1    hydrocortisone 1 % cream Apply topically 2 times daily Apply topically 2 times daily. Multiple Vitamins-Minerals (ICAPS AREDS 2 PO) Take 1 capsule by mouth 2 times daily      aspirin 81 MG chewable tablet Take 81 mg by mouth daily. No current facility-administered medications for this visit. No orders of the defined types were placed in this encounter. Subjective:   HPI    Inpatient course: Discharge summary reviewed- see chart.     Interval history/Current status:     stent as   anginal  equivalent    is  dyspnea         No  gerd        On  triple  thinners    Patient Active Problem List   Diagnosis    ASHD (arteriosclerotic heart disease)    Hypercholesteremia    Hearing loss    Arthritis    Colon polyps    Narcolepsy    Rotator cuff (capsule) sprain    Complex sleep apnea syndrome    Central sleep apnea due to Cheyne-Santos respiration    Angina of effort [facility])    Paroxysmal atrial fibrillation ([facility] [address]    SVT (supraventricular tachycardia) ([facility] [address]    Status post angioplasty with stent       Medications listed as ordered at the time of discharge from hospital     Medication List            Accurate as of November 15, 2022  2:42 PM. If you have any questions, ask your nurse or doctor. CONTINUE taking these medications      amLODIPine 2.5 MG tablet  Commonly known as: NORVASC  Take 1 tablet by mouth daily     apixaban 5 MG Tabs tablet  Commonly known as: Eliquis  Take 1 tablet by mouth 2 times daily     aspirin 81 MG chewable tablet     atorvastatin 40 MG tablet  Commonly known as: LIPITOR  TAKE 1 TABLET BY MOUTH EVERY DAY     clopidogrel 75 MG tablet  Commonly known as: PLAVIX  Take 1 tablet by mouth daily     fluocinonide 0.05 % gel  Commonly known as: LIDEX  Apply topically 2 times daily. hydrocortisone 1 % cream     ICAPS AREDS 2 PO     ipratropium 0.06 % nasal spray  Commonly known as: ATROVENT  2 sprays by Each Nostril route 3 times daily     ketoconazole 2 % cream  Commonly known as: NIZORAL  Apply topically daily. Am and  pm     loratadine 10 MG capsule  Commonly known as: CLARITIN     meclizine 25 MG tablet  Commonly known as: ANTIVERT  Take 1 tablet by mouth every 8 hours as needed for Dizziness     methylphenidate 20 MG tablet  Commonly known as: RITALIN  Take 1 tablet by mouth 3 times daily for 90 days. nitroGLYCERIN 0.4 MG SL tablet  Commonly known as: NITROSTAT  Place 1 tablet under the tongue every 5 minutes as needed for Chest pain up to max of 3 total doses.  If no relief after 1 dose, call 911.     silodosin 8 MG Caps  Commonly known as: RAPAFLO  Take 1 capsule by mouth every evening     Sunosi 150 MG Tabs  Generic drug: Solriamfetol HCl  Take 150 mg by mouth daily               Medications marked \"taking\" at this time  Outpatient Medications Marked as Taking for the 11/15/22 encounter (Office Visit) with Aly Kenny MD Medication Sig Dispense Refill    nitroGLYCERIN (NITROSTAT) 0.4 MG SL tablet Place 1 tablet under the tongue every 5 minutes as needed for Chest pain up to max of 3 total doses. If no relief after 1 dose, call 911. 25 tablet 1    amLODIPine (NORVASC) 2.5 MG tablet Take 1 tablet by mouth daily 30 tablet 3    clopidogrel (PLAVIX) 75 MG tablet Take 1 tablet by mouth daily 30 tablet 3    apixaban (ELIQUIS) 5 MG TABS tablet Take 1 tablet by mouth 2 times daily 180 tablet 0    silodosin (RAPAFLO) 8 MG CAPS Take 1 capsule by mouth every evening 30 capsule 11    methylphenidate (RITALIN) 20 MG tablet Take 1 tablet by mouth 3 times daily for 90 days. 270 tablet 0    fluocinonide (LIDEX) 0.05 % gel Apply topically 2 times daily. 15 g 1    atorvastatin (LIPITOR) 40 MG tablet TAKE 1 TABLET BY MOUTH EVERY DAY 90 tablet 1    Solriamfetol HCl (SUNOSI) 150 MG TABS Take 150 mg by mouth daily 90 tablet 1    loratadine (CLARITIN) 10 MG capsule Take 10 mg by mouth daily      ipratropium (ATROVENT) 0.06 % nasal spray 2 sprays by Each Nostril route 3 times daily 15 mL 1    meclizine (ANTIVERT) 25 MG tablet Take 1 tablet by mouth every 8 hours as needed for Dizziness 30 tablet 0    ketoconazole (NIZORAL) 2 % cream Apply topically daily. Am and  pm 30 g 1    hydrocortisone 1 % cream Apply topically 2 times daily Apply topically 2 times daily. Multiple Vitamins-Minerals (ICAPS AREDS 2 PO) Take 1 capsule by mouth 2 times daily      aspirin 81 MG chewable tablet Take 81 mg by mouth daily. Medications patient taking as of now reconciled against medications ordered at time of hospital discharge: Yes    Review of Systems   Constitutional:  Negative for fatigue and fever. HENT:  Negative for congestion, ear pain, postnasal drip, sore throat and trouble swallowing. Eyes:  Negative for pain. Respiratory:  Negative for cough, chest tightness and shortness of breath.     Cardiovascular:  Negative for chest pain, palpitations and leg swelling. Gastrointestinal:  Negative for abdominal pain, blood in stool, constipation and nausea. Genitourinary:  Negative for difficulty urinating, frequency and urgency. Musculoskeletal:  Negative for arthralgias, back pain, joint swelling and neck stiffness. Skin:  Negative for rash. Neurological:  Negative for dizziness, weakness and headaches. Hematological:  Negative for adenopathy. Does not bruise/bleed easily. Psychiatric/Behavioral:  Negative for behavioral problems, dysphoric mood and sleep disturbance. Objective:    /80 (Site: Right Upper Arm, Position: Sitting, Cuff Size: Medium Adult)   Pulse 84   Resp 16   Ht 5' 7\" (1.702 m)   Wt 140 lb (63.5 kg)   BMI 21.93 kg/m²   Physical Exam  Vitals and nursing note reviewed. Constitutional:       Appearance: He is well-developed. HENT:      Head: Normocephalic and atraumatic. Right Ear: External ear normal.      Left Ear: External ear normal.      Nose: Nose normal.   Eyes:      Conjunctiva/sclera: Conjunctivae normal.      Pupils: Pupils are equal, round, and reactive to light. Comments: Fundi nl   Neck:      Thyroid: No thyromegaly. Cardiovascular:      Rate and Rhythm: Normal rate and regular rhythm. Heart sounds: Normal heart sounds. Pulmonary:      Effort: Pulmonary effort is normal.      Breath sounds: Normal breath sounds. No wheezing or rales. Abdominal:      General: Bowel sounds are normal.      Palpations: Abdomen is soft. There is no mass. Tenderness: There is no abdominal tenderness. Musculoskeletal:         General: Normal range of motion. Cervical back: Normal range of motion and neck supple. Lymphadenopathy:      Cervical: No cervical adenopathy. Skin:     General: Skin is warm and dry. Findings: No rash. Neurological:      Mental Status: He is alert and oriented to person, place, and time. Cranial Nerves: No cranial nerve deficit.       Deep Tendon Reflexes: Reflexes are normal and symmetric. An electronic signature was used to authenticate this note.   --Carlitos Porras MD

## 2022-11-17 ENCOUNTER — CARE COORDINATION (OUTPATIENT)
Dept: FAMILY MEDICINE CLINIC | Age: 72
End: 2022-11-17

## 2022-11-17 NOTE — CARE COORDINATION
I saw Krishna Concepcion when he came in for his follow up appointment. He is feeling about the same. He is puzzled because he is not sure why he had the  chest pain. He was having more shortness of breath. His pharmacist brought up the Norvasc/Eliquis question. I offered him reassurance about that. I will follow up with him next week.

## 2022-11-23 ENCOUNTER — CARE COORDINATION (OUTPATIENT)
Dept: FAMILY MEDICINE CLINIC | Age: 72
End: 2022-11-23

## 2022-11-23 NOTE — PLAN OF CARE
Hospital Facility-Based Program  [facility]/Traditional Cardiac Rehab  PHYSICIAN ORDER  Class I Level B based on research  Medical Director:  Dr. Evelyne Mansfield MD     Patient Name: Huy Gonzalez : 1950  Referring Physician: Dr. Staley Page  Date: 2022  Allergies: Allergies as of 2022 - Fully Reviewed 11/15/2022   Allergen Reaction Noted    Adderall [amphetamine-dextroamphetamine] Shortness Of Breath 2021    Flomax [tamsulosin] Other (See Comments) 2021    Metoprolol Other (See Comments) 2022    Other Other (See Comments) 2021    Polysporin [bacitracin-polymyxin b] Hives 2020    Provigil [modafinil] Other (See Comments) 2021        Diagnosis:  PCI on 22    [x] [facility] with telemetry monitoring, resting and exercise        BPs & HRs with each session. Hospital setting for patient safety. [x] 72 sessions: 36 exercise sessions, 36 education sessions   [] 36 sessions: 18 exercise sessions, 18 education sessions  [] Traditional Cardiac Rehab with telemetry monitoring, resting and exercise BPs &       HRs with each session. Hospital setting for patient safety. [] 36 sessions:  32 exercise sessions, 4 education sessions     Per Patient symptoms, proceed with:   [x]Nitroglycerine 0.4mg SL every 5 minutes prn, maximum of 3, for chest pain   [x]12-lead EKG for symptoms of chest pain or noted change in heart rhythm   [x]Administer O2 per nasal cannula for symptoms of chest pain or acute dyspnea    Physician Prescribed Exercise:  Plan of Care:  Patient to attend exercise sessions with aerobic endurance and strength training for a total of 31-60 min/day, 3 days/week with supplemented 30+ minutes of aerobic exercise at home on days not participating in Cardiac Rehab.    Aerobic Endurance Training  Aerobic Endurance mode (TM, AD, NS) starting at 5-8 minutes progressing by 2-3 minutes each week to a total of 15-30 minutes 2-3x/week. Arms only 5 min  Stair step increasing to 2 min  Resistance/strength training:  Hand weights starting at 1-5 lbs increasing in weight by 1-2 lbs and/or per patient tolerance weekly. Start with 8 repetitions and increase the repetitions each exercise session per patient tolerance for a total of 15 repetitions. Measurable Endurance Goal:  Aerobic endurance goal to be measured in minutes. Start endurance training per patient tolerance at 1-8 minutes per exercise type, progressing to a total of 31-60 minutes using various modes of training (see Exercise Prescription). Progression:    [x] Weekly 5-10% intensity progression, as tolerated, during cardiac rehab sessions. [x] 13-17 Rate of Perceived Exertion on the Delia Scale (6-20). Measurable Muscular Strength Goal:  Starting at 1-5 lbs x 8 reps, progressing to 5-25 lbs x 15 reps per patient tolerance.       Electronically signed by Meghann Harley on 11/23/2022 at 9:19 AM    Exercise Physiologist/Date/Time

## 2022-12-01 ENCOUNTER — CARE COORDINATION (OUTPATIENT)
Dept: FAMILY MEDICINE CLINIC | Age: 72
End: 2022-12-01

## 2022-12-05 ENCOUNTER — OFFICE VISIT (OUTPATIENT)
Dept: UROLOGY | Age: 72
End: 2022-12-05
Payer: COMMERCIAL

## 2022-12-05 VITALS — HEIGHT: 67 IN | BODY MASS INDEX: 22.44 KG/M2 | WEIGHT: 143 LBS | RESPIRATION RATE: 16 BRPM

## 2022-12-05 DIAGNOSIS — N40.1 BENIGN LOCALIZED PROSTATIC HYPERPLASIA WITH LOWER URINARY TRACT SYMPTOMS (LUTS): Primary | ICD-10-CM

## 2022-12-05 DIAGNOSIS — R97.20 ELEVATED PSA: ICD-10-CM

## 2022-12-05 LAB
BILIRUBIN URINE: NEGATIVE
BLOOD URINE, POC: NEGATIVE
CHARACTER, URINE: CLEAR
COLOR, URINE: YELLOW
GLUCOSE URINE: NEGATIVE MG/DL
KETONES, URINE: NEGATIVE
LEUKOCYTE CLUMPS, URINE: NEGATIVE
NITRITE, URINE: NEGATIVE
PH, URINE: 5.5 (ref 5–9)
POST VOID RESIDUAL (PVR): 78 ML
PROTEIN, URINE: NEGATIVE MG/DL
SPECIFIC GRAVITY, URINE: 1.02 (ref 1–1.03)
UROBILINOGEN, URINE: 0.2 EU/DL (ref 0–1)

## 2022-12-05 PROCEDURE — 81003 URINALYSIS AUTO W/O SCOPE: CPT | Performed by: UROLOGY

## 2022-12-05 PROCEDURE — 99214 OFFICE O/P EST MOD 30 MIN: CPT | Performed by: UROLOGY

## 2022-12-05 PROCEDURE — 1123F ACP DISCUSS/DSCN MKR DOCD: CPT | Performed by: UROLOGY

## 2022-12-05 PROCEDURE — 51798 US URINE CAPACITY MEASURE: CPT | Performed by: UROLOGY

## 2022-12-05 NOTE — PROGRESS NOTES
[address]  [address]  [facility] [address]  Dept: [phone]  Dept Fax: [phone]  Loc: [address] [facility] Urology Office Note -     Patient:  Elis Loo  YOB: 1950    The patient is a 71 y.o. male who presents today for evaluation of the following problems:   Chief Complaint   Patient presents with    Benign Prostatic Hypertrophy        History of Present Illness:    BPH  Has had retention in the past  Can't take flomax  Silodosin mildly helpful  Auass 25 very bothered- unhappy- intermittent and weak stream  Last cystoscopy in 2021- LLH++ no median lobe    Elevated PSA  Rising PSA  Getting abx  Needs recheck        Requested/reviewed records from Jo Norman MD office and/or outside physician/EMR    (Patient's old records have been requested, reviewed and pertinent findings summarized in today's note.)    Procedures Today: N/A    Last several PSA's:  Lab Results   Component Value Date    PSA 2.92 (H) 03/02/2017    PSA 2.64 (H) 02/04/2016    PSA 2.24 02/19/2015       Last total testosterone:  No results found for: TESTOSTERONE    Urinalysis today:  Results for POC orders placed in visit on 12/05/22   POCT Urinalysis No Micro (Auto)   Result Value Ref Range    Glucose, Ur Negative NEGATIVE mg/dl    Bilirubin Urine Negative     Ketones, Urine Negative NEGATIVE    Specific Gravity, Urine 1.020 1.002 - 1.030    Blood, UA POC Negative NEGATIVE    pH, Urine 5.50 5.0 - 9.0    Protein, Urine Negative NEGATIVE mg/dl    Urobilinogen, Urine 0.20 0.0 - 1.0 eu/dl    Nitrite, Urine Negative NEGATIVE    Leukocyte Clumps, Urine Negative NEGATIVE    Color, Urine Yellow YELLOW-STRAW    Character, Urine Clear CLR-SL.CLOUD   poct post void residual   Result Value Ref Range    post void residual 78 ml       Last BUN and creatinine:  Lab Results   Component Value Date    BUN 17 11/10/2022     Lab Results   Component Value Date    CREATININE 0.9 11/10/2022       Imaging Reviewed during this Office Visit:   Josie Jamil MD independently reviewed the images and verified the radiology reports from:    No results found. PAST MEDICAL, FAMILY AND SOCIAL HISTORY:  Past Medical History:   Diagnosis Date    Actinic keratoses     Angina effort      Replacing deprecated diagnoses    Arthritis     hands    ASHD (arteriosclerotic heart disease)     Carotid disease, bilateral ([address] 2017    Colon polyps 05/2007    Hypercholesteremia 07/1997    Narcolepsy     Rotator cuff (capsule) sprain     Tobacco abuse     Unspecified sleep apnea     cpap     Past Surgical History:   Procedure Laterality Date    APPENDECTOMY      CATARACT REMOVAL Bilateral 2016    Right- Oct, Left- Dec- DR ROBISON     COLONOSCOPY  2007,     sheik    CORONARY ANGIOPLASTY WITH STENT PLACEMENT  07/2005    CORONARY ANGIOPLASTY WITH STENT PLACEMENT  11/2017    2 nd stent to LAD    CORONARY ANGIOPLASTY WITH STENT PLACEMENT  11/09/2022    Dr Lyndsey Shah @ [address] [place] [address]  09/2016    Dr Sangeeta David, every 2 weeks x 3 times     Sherwood Mortimer Left 2015    Dr Myranda Allison @ [facility]   rt 2015    TONSILLECTOMY       Family History   Problem Relation Age of Onset    Stroke Mother     Diabetes Mother     Heart Disease Father     Asthma Father      Outpatient Medications Marked as Taking for the 12/5/22 encounter (Office Visit) with Rhoda Szymanski MD   Medication Sig Dispense Refill    nitroGLYCERIN (NITROSTAT) 0.4 MG SL tablet Place 1 tablet under the tongue every 5 minutes as needed for Chest pain up to max of 3 total doses.  If no relief after 1 dose, call 911. 25 tablet 1    amLODIPine (NORVASC) 2.5 MG tablet Take 1 tablet by mouth daily 30 tablet 3    clopidogrel (PLAVIX) 75 MG tablet Take 1 tablet by mouth daily 30 tablet 3    apixaban (ELIQUIS) 5 MG TABS tablet Take 1 tablet by mouth 2 times daily 180 tablet 0    silodosin (RAPAFLO) 8 MG CAPS Take 1 capsule by mouth every evening 30 capsule 11    methylphenidate (RITALIN) 20 MG tablet Take 1 tablet by mouth 3 times daily for 90 days. 270 tablet 0    fluocinonide (LIDEX) 0.05 % gel Apply topically 2 times daily. 15 g 1    atorvastatin (LIPITOR) 40 MG tablet TAKE 1 TABLET BY MOUTH EVERY DAY 90 tablet 1    Solriamfetol HCl (SUNOSI) 150 MG TABS Take 150 mg by mouth daily 90 tablet 1    loratadine (CLARITIN) 10 MG capsule Take 10 mg by mouth daily      ipratropium (ATROVENT) 0.06 % nasal spray 2 sprays by Each Nostril route 3 times daily 15 mL 1    meclizine (ANTIVERT) 25 MG tablet Take 1 tablet by mouth every 8 hours as needed for Dizziness 30 tablet 0    ketoconazole (NIZORAL) 2 % cream Apply topically daily. Am and  pm 30 g 1    hydrocortisone 1 % cream Apply topically 2 times daily Apply topically 2 times daily. Multiple Vitamins-Minerals (ICAPS AREDS 2 PO) Take 1 capsule by mouth 2 times daily      aspirin 81 MG chewable tablet Take 81 mg by mouth daily.            Adderall [amphetamine-dextroamphetamine], Flomax [tamsulosin], Metoprolol, Other, Polysporin [bacitracin-polymyxin b], and Provigil [modafinil]  Social History     Tobacco Use   Smoking Status Former    Packs/day: 1.00    Years: 45.00    Pack years: 45.00    Types: Cigarettes    Quit date: 2005    Years since quittin.4   Smokeless Tobacco Never      (If patient a smoker, smoking cessation counseling offered)   Social History     Substance and Sexual Activity   Alcohol Use Yes    Alcohol/week: 4.0 standard drinks    Types: 4 Cans of beer per week    Comment: social       REVIEW OF SYSTEMS:  Constitutional: negative  Eyes: negative  Respiratory: negative  Cardiovascular: negative  Gastrointestinal: negative  Genitourinary: see HPI  Musculoskeletal: negative  Skin: negative   Neurological: negative  Hematological/Lymphatic: negative  Psychological: negative      Physical Exam:    This a 71 y.o. male  Vitals: 12/05/22 1339   Resp: 16     Body mass index is 22.4 kg/m². Constitutional: Patient in no acute distress;         Assessment and Plan        1. Benign localized prostatic hyperplasia with lower urinary tract symptoms (LUTS)               Plan:      BPH  Worsening voiding  Has had retention in the past  Very bothered  Discussed PVP in past. Once again discussed risks. Elevated PSA  Rising  Getting recheck after abx (dr Jaime Nair ordered) in [place]  F/u in [place] with results-- if still elevated prostate MRI            Prescriptions Ordered:  No orders of the defined types were placed in this encounter.      Orders Placed:  Orders Placed This Encounter   Procedures    POCT Urinalysis No Micro (Auto)    poct post void residual     Bladder scan            MILTON De La Fuente MD

## 2022-12-06 ENCOUNTER — HOSPITAL ENCOUNTER (OUTPATIENT)
Dept: CARDIAC REHAB | Age: 72
Setting detail: THERAPIES SERIES
Discharge: HOME | End: 2022-12-06

## 2022-12-08 ENCOUNTER — OFFICE VISIT (OUTPATIENT)
Dept: CARDIOLOGY CLINIC | Age: 72
End: 2022-12-08
Payer: COMMERCIAL

## 2022-12-08 VITALS
SYSTOLIC BLOOD PRESSURE: 112 MMHG | WEIGHT: 143 LBS | BODY MASS INDEX: 22.44 KG/M2 | HEART RATE: 76 BPM | HEIGHT: 67 IN | DIASTOLIC BLOOD PRESSURE: 68 MMHG

## 2022-12-08 DIAGNOSIS — I25.10 ASHD (ARTERIOSCLEROTIC HEART DISEASE): ICD-10-CM

## 2022-12-08 DIAGNOSIS — I10 ESSENTIAL HYPERTENSION: ICD-10-CM

## 2022-12-08 DIAGNOSIS — E78.00 HYPERCHOLESTEREMIA: ICD-10-CM

## 2022-12-08 DIAGNOSIS — I48.0 PAROXYSMAL ATRIAL FIBRILLATION (HCC): Primary | ICD-10-CM

## 2022-12-08 PROCEDURE — 3078F DIAST BP <80 MM HG: CPT | Performed by: STUDENT IN AN ORGANIZED HEALTH CARE EDUCATION/TRAINING PROGRAM

## 2022-12-08 PROCEDURE — 1123F ACP DISCUSS/DSCN MKR DOCD: CPT | Performed by: STUDENT IN AN ORGANIZED HEALTH CARE EDUCATION/TRAINING PROGRAM

## 2022-12-08 PROCEDURE — 3074F SYST BP LT 130 MM HG: CPT | Performed by: STUDENT IN AN ORGANIZED HEALTH CARE EDUCATION/TRAINING PROGRAM

## 2022-12-08 PROCEDURE — 99214 OFFICE O/P EST MOD 30 MIN: CPT | Performed by: STUDENT IN AN ORGANIZED HEALTH CARE EDUCATION/TRAINING PROGRAM

## 2022-12-08 NOTE — PROGRESS NOTES
Pt here for fu cath with stent     Pt continues with sob     Pt states med list is correct from 12/5/22

## 2022-12-08 NOTE — PROGRESS NOTES
[facility] PROFESSIONAL SERVICES  [facility] [place]   [address]   [address] [address]   Dept: [phone]   Dept Fax: [phone]: [phone]      Chief Complaint   Patient presents with    Follow-Up from Hospital    Coronary Artery Disease    Shortness of Breath     Fu cath        Cardiologist:  Dr. Saha Neither  72 yo male presents for f/u of [facility] with pCI to LAD x 1. Hx of prior stents, HTN, HLD, afib. Breathing has been better. Not having any SOB now. No chest pain. No dizziness. Has been better with his symptoms and back to doing his usual activities since then. He is very active and asks about whether rehab truly beneficial for him. He did go for his first appointment but unsure about going again.      General:   No fever, no chills, no weight loss, no fatigue  Pulmonary:    No dyspnea, no wheezing  Cardiac:    Denies recent chest pain   GI:     No nausea or vomiting, no abdominal pain  Neuro:      No dizziness or light headedness  Musculoskeletal:  No recent active issues  Extremities:   No edema      Past Medical History:   Diagnosis Date    Actinic keratoses     Angina effort      Replacing deprecated diagnoses    Arthritis     hands    ASHD (arteriosclerotic heart disease)     Carotid disease, bilateral [address] [address] 2017    Colon polyps 05/2007    Hypercholesteremia 07/1997    Narcolepsy     Rotator cuff (capsule) sprain     Tobacco abuse     Unspecified sleep apnea     cpap       Allergies   Allergen Reactions    Adderall [Amphetamine-Dextroamphetamine] Shortness Of Breath    Flomax [Tamsulosin] Other (See Comments)     Back pain     Metoprolol Other (See Comments)     Lower back pain    Other Other (See Comments)     brilenta-low back pain     Polysporin [Bacitracin-Polymyxin B] Hives    Provigil [Modafinil] Other (See Comments)     Low back pain        Current Outpatient Medications   Medication Sig Dispense Refill    nitroGLYCERIN (NITROSTAT) 0.4 MG SL tablet Place 1 tablet under the tongue every 5 minutes as needed for Chest pain up to max of 3 total doses. If no relief after 1 dose, call 911. 25 tablet 1    amLODIPine (NORVASC) 2.5 MG tablet Take 1 tablet by mouth daily 30 tablet 3    clopidogrel (PLAVIX) 75 MG tablet Take 1 tablet by mouth daily 30 tablet 3    apixaban (ELIQUIS) 5 MG TABS tablet Take 1 tablet by mouth 2 times daily 180 tablet 0    silodosin (RAPAFLO) 8 MG CAPS Take 1 capsule by mouth every evening 30 capsule 11    methylphenidate (RITALIN) 20 MG tablet Take 1 tablet by mouth 3 times daily for 90 days. 270 tablet 0    fluocinonide (LIDEX) 0.05 % gel Apply topically 2 times daily. 15 g 1    atorvastatin (LIPITOR) 40 MG tablet TAKE 1 TABLET BY MOUTH EVERY DAY 90 tablet 1    Solriamfetol HCl (SUNOSI) 150 MG TABS Take 150 mg by mouth daily 90 tablet 1    loratadine (CLARITIN) 10 MG capsule Take 10 mg by mouth daily      ipratropium (ATROVENT) 0.06 % nasal spray 2 sprays by Each Nostril route 3 times daily 15 mL 1    meclizine (ANTIVERT) 25 MG tablet Take 1 tablet by mouth every 8 hours as needed for Dizziness 30 tablet 0    ketoconazole (NIZORAL) 2 % cream Apply topically daily. Am and  pm 30 g 1    hydrocortisone 1 % cream Apply topically 2 times daily Apply topically 2 times daily. Multiple Vitamins-Minerals (ICAPS AREDS 2 PO) Take 1 capsule by mouth 2 times daily      aspirin 81 MG chewable tablet Take 81 mg by mouth daily. No current facility-administered medications for this visit.        Social History     Socioeconomic History    Marital status:      Spouse name: Trenton Krishnan    Number of children: 3    Years of education: None    Highest education level: None   Tobacco Use    Smoking status: Former     Packs/day: 1.00     Years: 45.00     Pack years: 45.00     Types: Cigarettes     Quit date: 2005     Years since quittin.4    Smokeless tobacco: Never   Vaping Use    Vaping Use: Never used   Substance and Sexual Activity    Alcohol use: Yes     Alcohol/week: 4.0 standard drinks     Types: 4 Cans of beer per week     Comment: social    Drug use: No    Sexual activity: Yes     Partners: Female     Social Determinants of Health     Financial Resource Strain: Low Risk     Difficulty of Paying Living Expenses: Not hard at all   Food Insecurity: No Food Insecurity    Worried About Running Out of Food in the Last Year: Never true    Ran Out of Food in the Last Year: Never true       Family History   Problem Relation Age of Onset    Stroke Mother     Diabetes Mother     Heart Disease Father     Asthma Father        Blood pressure 112/68, pulse 76, height 5' 7\" (1.702 m), weight 143 lb (64.9 kg). General:   Well developed, well nourished  Lungs:    Clear to auscultation, no rales  Heart:    RRR, Normal S1 S2, No murmur, rubs, or gallops  Abdomen:   Soft, non tender, no organomegalies, positive bowel sounds  Extremities:   No edema, no cyanosis, good peripheral pulses  Neurological:   Awake, alert, oriented. No obvious focal deficits  Musculoskeletal:  No obvious deformities    EKG:          Diagnosis Orders   1. Paroxysmal atrial fibrillation (HCC)        2. ASHD (arteriosclerotic heart disease)        3. Essential hypertension        4. Hypercholesteremia            No orders of the defined types were placed in this encounter. Assessment/Plan:   CAD s/p PCI- breathing improved, no further anginal symptoms. No chest pain. Continue current management with plavix, asa, statin, norvasc, nitro prn. HTN-well controlled. On norvasc in place of bb d/t to intolerance. Bp has been stable. HLD-continue lipitor. PAF-on eliquis for suspected afib. Does not notice any palpitations. Continue with current management.      Disposition:   Follow up with Dr Michelle Hernandez as scheduled or sooner if needed

## 2022-12-09 ENCOUNTER — TELEPHONE (OUTPATIENT)
Dept: FAMILY MEDICINE CLINIC | Age: 72
End: 2022-12-09

## 2022-12-09 ENCOUNTER — CARE COORDINATION (OUTPATIENT)
Dept: FAMILY MEDICINE CLINIC | Age: 72
End: 2022-12-09

## 2022-12-09 DIAGNOSIS — R97.20 ELEVATED PSA, LESS THAN 10 NG/ML: Primary | ICD-10-CM

## 2022-12-09 DIAGNOSIS — R40.0 DAYTIME SLEEPINESS: ICD-10-CM

## 2022-12-09 DIAGNOSIS — R41.89 BRAIN FOG: ICD-10-CM

## 2022-12-09 NOTE — CARE COORDINATION
Juancarlos Cooper is doing very well. He has had no further episodes of Rapid Heart Rates and he is back to his normal daily activities. He went to cardiac rehab one time but he is very active normally so he does not know if he is going back. He said his 30 day monitor showed nothing of concern and he is doing fine. He thanked me for calling to check on him.

## 2022-12-09 NOTE — TELEPHONE ENCOUNTER
Received refill request for Sunosi. Medication was last ordered by Jero Parry. Medication was last ordered on 5/23/22 with 1 refills. Patient was last seen in the office 10/4/22. Does patient have a scheduled follow up?: yes - 4/10/23    Medication needs to be sent to [facility] [address] - [place], [address] [facility] [phone]. Thank you, please advise! Patient's Allergies:   Allergies   Allergen Reactions    Adderall [Amphetamine-Dextroamphetamine] Shortness Of Breath    Flomax [Tamsulosin] Other (See Comments)     Back pain     Metoprolol Other (See Comments)     Lower back pain    Other Other (See Comments)     brilenta-low back pain     Polysporin [Bacitracin-Polymyxin B] Hives    Provigil [Modafinil] Other (See Comments)     Low back pain

## 2022-12-12 NOTE — PROGRESS NOTES
Subjective:      Patient ID: Villa Siddiqui is a 80 y.o. male. ashd  And  With  Monitor   A nd  With  Dr Jeanine Villafana  Negative but   Had  A fib   With  The  Stress  Test  And  Resolved and  With  Monitor  230  Day       bph  With  Catheter and    Catheter  Out on  8-16  And  on   flomax       urology  And prostate  staBLE   AND  HAD  CYSTOSCOPY      uncircumcised    Rash  This is a new problem. The current episode started 1 to 4 weeks ago. The affected locations include the genitalia. The rash is characterized by redness and itchiness (  no  symptoms  urinary ). Pertinent negatives include no congestion, cough, eye pain, fatigue, fever, shortness of breath or sore throat. Past treatments include nothing. The treatment provided mild relief. Past Medical History:   Diagnosis Date    Actinic keratoses     Angina effort      Replacing deprecated diagnoses    Arthritis     hands    ASHD (arteriosclerotic heart disease)     Carotid disease, bilateral ([address] 2017    Colon polyps 5/07    Hypercholesteremia 7/97    Narcolepsy     Rotator cuff (capsule) sprain     Tobacco abuse     Unspecified sleep apnea      Review of Systems   Constitutional: Negative for fatigue and fever. HENT: Negative for congestion, ear pain, postnasal drip, sore throat and trouble swallowing. Eyes: Negative for pain. Respiratory: Negative for cough, chest tightness and shortness of breath. Cardiovascular: Negative for chest pain, palpitations and leg swelling. Gastrointestinal: Negative for abdominal pain, blood in stool, constipation and nausea. Genitourinary: Negative for difficulty urinating, frequency and urgency. Musculoskeletal: Negative for arthralgias, back pain, joint swelling and neck stiffness. Skin: Positive for rash. Neurological: Negative for dizziness, weakness and headaches. Hematological: Negative for adenopathy. Does not bruise/bleed easily.    Psychiatric/Behavioral: Negative for behavioral problems, dysphoric mood and sleep disturbance. /68 (Site: Right Upper Arm, Position: Sitting, Cuff Size: Medium Adult)   Pulse 64   Temp 97.4 °F (36.3 °C) (Infrared)   Resp 12   Ht 5' 7\" (1.702 m)   Wt 152 lb 4 oz (69.1 kg)   BMI 23.85 kg/m²   Objective:   Physical Exam  Vitals and nursing note reviewed. Constitutional:       Appearance: He is well-developed. HENT:      Head: Normocephalic and atraumatic. Right Ear: External ear normal.      Left Ear: External ear normal.      Nose: Nose normal.   Eyes:      Conjunctiva/sclera: Conjunctivae normal.      Pupils: Pupils are equal, round, and reactive to light. Comments: Fundi nl   Neck:      Thyroid: No thyromegaly. Cardiovascular:      Rate and Rhythm: Normal rate and regular rhythm. Heart sounds: Normal heart sounds. Pulmonary:      Effort: Pulmonary effort is normal.      Breath sounds: Normal breath sounds. No wheezing or rales. Abdominal:      General: Bowel sounds are normal.      Palpations: Abdomen is soft. There is no mass. Tenderness: There is no abdominal tenderness. Genitourinary:     Comments: Foreskin is all normal however when we retract the foreskin head of the penis no some increased redness irritation  Musculoskeletal:         General: Normal range of motion. Cervical back: Normal range of motion and neck supple. Lymphadenopathy:      Cervical: No cervical adenopathy. Skin:     General: Skin is warm and dry. Findings: No rash. Neurological:      Mental Status: He is alert and oriented to person, place, and time. Cranial Nerves: No cranial nerve deficit. Deep Tendon Reflexes: Reflexes are normal and symmetric. Scalp  All  wnl    Assessment:       Diagnosis Orders   1. Balanitis           Plan:      Current Outpatient Medications   Medication Sig Dispense Refill    ketoconazole (NIZORAL) 2 % cream Apply topically daily.  Am and  pm 30 g 1    methylphenidate (RITALIN) 20 MG tablet Take 20 mg by mouth 2 times daily. 2-3 times a day      meclizine (ANTIVERT) 25 MG tablet Take 25 mg by mouth 3 times daily as needed      hydrocortisone 1 % cream Apply topically 2 times daily Apply topically 2 times daily.  tamsulosin (FLOMAX) 0.4 MG capsule Take 1 capsule by mouth daily 90 capsule 3    ibuprofen (ADVIL;MOTRIN) 200 MG tablet Take 200 mg by mouth every 6 hours as needed for Pain      atorvastatin (LIPITOR) 40 MG tablet TAKE 1 TABLET BY MOUTH EVERY DAY  90 tablet 1    fluocinonide (LIDEX) 0.05 % gel Apply topically 2 times daily. 15 g 1    Multiple Vitamins-Minerals (ICAPS AREDS 2 PO) Take 1 capsule by mouth 2 times daily      ipratropium (ATROVENT) 0.06 % nasal spray 2 sprays by Nasal route daily as needed       cetirizine (ZYRTEC) 10 MG tablet Take 10 mg by mouth daily       B Complex Vitamins (VITAMIN-B COMPLEX PO) Take 1 tablet by mouth daily.  Calcium Carbonate-Vitamin D (CALCIUM 500/VITAMIN D PO) Take 1 tablet by mouth daily.  aspirin 81 MG chewable tablet Take 81 mg by mouth daily. No current facility-administered medications for this visit.          Keep appt     Abeba Gottlieb MD 12-Dec-2022 18:51

## 2022-12-14 RX ORDER — SOLRIAMFETOL 150 MG/1
150 TABLET, FILM COATED ORAL DAILY
Qty: 90 TABLET | Refills: 1 | Status: SHIPPED | OUTPATIENT
Start: 2022-12-14

## 2022-12-19 ENCOUNTER — TELEPHONE (OUTPATIENT)
Dept: FAMILY MEDICINE CLINIC | Age: 72
End: 2022-12-19

## 2022-12-19 NOTE — TELEPHONE ENCOUNTER
----- Message from Jo Norman MD sent at 12/19/2022  5:16 AM EST -----  Psa back done to 3.03 so normal range and make sure  dr Ld Olivarez has result    Back  to normal range as before and possible repeat in 4 to 6mths mths  as per dr Ld Olivarez

## 2022-12-26 DIAGNOSIS — E78.00 HYPERCHOLESTEREMIA: ICD-10-CM

## 2022-12-27 NOTE — TELEPHONE ENCOUNTER
Date of last visit:  11/15/2022  Date of next visit:  2/17/2023    Requested Prescriptions     Pending Prescriptions Disp Refills    atorvastatin (LIPITOR) 40 MG tablet 90 tablet 1     Sig: TAKE 1 TABLET BY MOUTH EVERY DAY

## 2022-12-28 RX ORDER — ATORVASTATIN CALCIUM 40 MG/1
TABLET, FILM COATED ORAL
Qty: 90 TABLET | Refills: 1 | Status: SHIPPED | OUTPATIENT
Start: 2022-12-28

## 2023-01-06 DIAGNOSIS — G47.10 HYPERSOMNIA: ICD-10-CM

## 2023-01-06 RX ORDER — METHYLPHENIDATE HYDROCHLORIDE 20 MG/1
20 TABLET ORAL 3 TIMES DAILY
Qty: 270 TABLET | Refills: 0 | Status: SHIPPED | OUTPATIENT
Start: 2023-01-06 | End: 2023-04-06

## 2023-01-16 ENCOUNTER — OFFICE VISIT (OUTPATIENT)
Dept: UROLOGY | Age: 73
End: 2023-01-16
Payer: COMMERCIAL

## 2023-01-16 DIAGNOSIS — R97.20 ELEVATED PSA: Primary | ICD-10-CM

## 2023-01-16 DIAGNOSIS — N13.8 BPH WITH OBSTRUCTION/LOWER URINARY TRACT SYMPTOMS: ICD-10-CM

## 2023-01-16 DIAGNOSIS — R39.12 WEAK URINARY STREAM: ICD-10-CM

## 2023-01-16 DIAGNOSIS — N40.1 BPH WITH OBSTRUCTION/LOWER URINARY TRACT SYMPTOMS: ICD-10-CM

## 2023-01-16 PROCEDURE — 99214 OFFICE O/P EST MOD 30 MIN: CPT

## 2023-01-16 PROCEDURE — 1123F ACP DISCUSS/DSCN MKR DOCD: CPT

## 2023-01-16 NOTE — PROGRESS NOTES
[facility] [address]  [address]  [facility] [address]  Dept: [phone]  Loc: [phone]  Visit Date: 1/16/2023    ZULEYKA Herman is a 71 y.o. male that presents to the urology clinic for PSA check. Rising PSA, was last seen by Dr. Maria De Jesus Ziegler for the same issue and was prescribed antibiotics. Today, patient returns for repeat PSA review. Returned to baseline of 3.03 on recheck after increase from 3.10 to 4.51 in ~ 10.5 months. Will hold off on MRI of the Prostate at this time. Patient with BPH, past history of urinary retention. Unable to tolerate Flomax, takes Silodosin which is mildly helpful. AUASS of 25 previously and today is 27, patient is not happy with stream. Cysto in 2021 notes lateral lobe hypertrophy without median lobe. Has discussed treatment in the past but is to the point that he would like to discuss surgery in more detail.     Most recent PSA: 3.03 (12/15/22)      Last BUN and creatinine:  Lab Results   Component Value Date    BUN 17 11/10/2022     Lab Results   Component Value Date    CREATININE 0.9 11/10/2022         PAST MEDICAL, FAMILY AND SOCIAL HISTORY UPDATE:  Past Medical History:   Diagnosis Date    Actinic keratoses     Angina effort      Replacing deprecated diagnoses    Arthritis     hands    ASHD (arteriosclerotic heart disease)     Carotid disease, bilateral ([address] 2017    Colon polyps 05/2007    Hypercholesteremia 07/1997    Narcolepsy     Rotator cuff (capsule) sprain     Tobacco abuse     Unspecified sleep apnea     cpap     Past Surgical History:   Procedure Laterality Date    APPENDECTOMY      CATARACT REMOVAL Bilateral 2016    Right- Oct, Left- Dec- DR ROBISON     COLONOSCOPY  2007,         CORONARY ANGIOPLASTY WITH STENT PLACEMENT  07/2005    CORONARY ANGIOPLASTY WITH STENT PLACEMENT  11/2017    2 nd stent to LAD    CORONARY ANGIOPLASTY WITH STENT PLACEMENT  11/09/2022    Dr Lizzy Arias @ [facility]    HEMORRHOID SURGERY  2016    Dr Macario, every 2 weeks x 3 times     ROTATOR CUFF REPAIR      ROTATOR CUFF REPAIR Left     Dr Jacobs @ [facility]   rt     TONSILLECTOMY       Family History   Problem Relation Age of Onset    Stroke Mother     Diabetes Mother     Heart Disease Father     Asthma Father      No outpatient medications have been marked as taking for the 23 encounter (Appointment) with Zander Salomon PA-C.       Adderall [amphetamine-dextroamphetamine], Flomax [tamsulosin], Metoprolol, Other, Polysporin [bacitracin-polymyxin b], and Provigil [modafinil]  Social History     Tobacco Use   Smoking Status Former    Packs/day: 1.00    Years: 45.00    Pack years: 45.00    Types: Cigarettes    Quit date: 2005    Years since quittin.5   Smokeless Tobacco Never       Social History     Substance and Sexual Activity   Alcohol Use Yes    Alcohol/week: 4.0 standard drinks    Types: 4 Cans of beer per week    Comment: social       REVIEW OF SYSTEMS:  Constitutional: negative  Eyes: negative  Respiratory: negative  Cardiovascular: negative  Gastrointestinal: negative  Musculoskeletal: negative  Genitourinary: negative except for what is in HPI  Skin: negative   Neurological: negative  Hematological/Lymphatic: negative  Psychological: negative    Physical Exam:    There were no vitals filed for this visit.  Patient is a 72 y.o. male in no acute distress and alert and oriented to person, place and time.    Pulmonary: Non-labored respiration.  Cardiovascular: Normal rate, regular rhythm, normal peripheral pulses.  Skin: Warm and dry.  Psych: Normal mood and affect.  Genitourinary: Bladder non-distended and non-tender.      Assessment and Plan   Elevated PSA  - Saw an acute elevation in PSA from 3.1 to 4.51 over the course of ~10.5 months placed on antibiotic and returned to baseline of 3.03 on repeat done 12/15/22.  - Will hold on MRI of the Prostate given return to baseline. Check PSA in 6  months. 2.   BPH w/ LUTS  3. Weak Urinary Stream  - Patient IPSS worsening, previously 25 and is 27 at today's visit. Cystoscopy from 8/16/2021 with Dr. Paresh Galvez showed marked lateral lobe hypertrophy with no median lobe obstruction.   - On Silodosin for obstructive symptoms. Better when on the medication vs off but benefit is declining. Does not tolerate Flomax. - Discussed surgical options including traditional TURP, Greenlight PVP, Urolift and patient opted for Greenlight PVP. Patient given pamphlet and risks vs benefits described in detail with the patient. All initial questions and concerns addressed. - Schedule for Greenlight PVP with Dr. Paresh Galvez. 45 minute block.        Chantelle Preston PA-C  Urology

## 2023-02-27 RX ORDER — CLOPIDOGREL BISULFATE 75 MG/1
75 TABLET ORAL DAILY
Qty: 90 TABLET | Refills: 0 | Status: SHIPPED | OUTPATIENT
Start: 2023-02-27

## 2023-02-27 NOTE — TELEPHONE ENCOUNTER
Haroldo Ball called requesting a refill on the following medications:    REQ 90 DAY SUPPLY WITH 2 RFS  Requested Prescriptions     Pending Prescriptions Disp Refills    clopidogrel (PLAVIX) 75 MG tablet 30 tablet 3     Sig: Take 1 tablet by mouth daily     Pharmacy verified:  [facility]      Date of last visit: 12-08-22  Date of next visit (if applicable): 6/22/4083

## 2023-03-10 ENCOUNTER — TELEPHONE (OUTPATIENT)
Dept: AUDIOLOGY | Age: 73
End: 2023-03-10

## 2023-03-17 ENCOUNTER — TELEPHONE (OUTPATIENT)
Dept: FAMILY MEDICINE CLINIC | Age: 73
End: 2023-03-17

## 2023-03-17 RX ORDER — TIZANIDINE 4 MG/1
4 TABLET ORAL EVERY 8 HOURS PRN
Qty: 30 TABLET | Refills: 0 | Status: SHIPPED | OUTPATIENT
Start: 2023-03-17

## 2023-03-17 NOTE — TELEPHONE ENCOUNTER
Date of last visit:  11/15/2022  Date of next visit:  Visit date not found    Requested Prescriptions     Signed Prescriptions Disp Refills    tiZANidine (ZANAFLEX) 4 MG tablet 30 tablet 0     Sig: Take 1 tablet by mouth every 8 hours as needed (Muscle Spasms)     Authorizing Provider: Omayra Chaudhary     Ordering User: Sukumar Jovel informed by Phone.

## 2023-03-17 NOTE — TELEPHONE ENCOUNTER
Pt called to req an muscle relaxer for his neck pt stated his neck has seized up on left side pt stated it hurt's to swallow    Please send to [facility] please call pt once addressed [phone]

## 2023-03-19 ENCOUNTER — APPOINTMENT (OUTPATIENT)
Dept: GENERAL RADIOLOGY | Age: 73
End: 2023-03-19
Payer: COMMERCIAL

## 2023-03-19 ENCOUNTER — HOSPITAL ENCOUNTER (EMERGENCY)
Age: 73
Discharge: HOME | End: 2023-03-19
Payer: COMMERCIAL

## 2023-03-19 VITALS
OXYGEN SATURATION: 100 % | TEMPERATURE: 97.6 F | RESPIRATION RATE: 16 BRPM | WEIGHT: 139.38 LBS | SYSTOLIC BLOOD PRESSURE: 144 MMHG | DIASTOLIC BLOOD PRESSURE: 92 MMHG | BODY MASS INDEX: 21.83 KG/M2 | HEART RATE: 67 BPM

## 2023-03-19 DIAGNOSIS — S16.1XXA STRAIN OF NECK MUSCLE, INITIAL ENCOUNTER: Primary | ICD-10-CM

## 2023-03-19 DIAGNOSIS — M25.512 ACUTE PAIN OF LEFT SHOULDER: ICD-10-CM

## 2023-03-19 PROCEDURE — 99213 OFFICE O/P EST LOW 20 MIN: CPT

## 2023-03-19 PROCEDURE — 6360000002 HC RX W HCPCS: Performed by: NURSE PRACTITIONER

## 2023-03-19 PROCEDURE — 72050 X-RAY EXAM NECK SPINE 4/5VWS: CPT

## 2023-03-19 PROCEDURE — 96372 THER/PROPH/DIAG INJ SC/IM: CPT

## 2023-03-19 PROCEDURE — 73030 X-RAY EXAM OF SHOULDER: CPT

## 2023-03-19 PROCEDURE — 99213 OFFICE O/P EST LOW 20 MIN: CPT | Performed by: NURSE PRACTITIONER

## 2023-03-19 RX ORDER — METHYLPREDNISOLONE ACETATE 80 MG/ML
40 INJECTION, SUSPENSION INTRA-ARTICULAR; INTRALESIONAL; INTRAMUSCULAR; SOFT TISSUE ONCE
Status: COMPLETED | OUTPATIENT
Start: 2023-03-19 | End: 2023-03-19

## 2023-03-19 RX ORDER — PREDNISONE 50 MG/1
50 TABLET ORAL DAILY
Qty: 5 TABLET | Refills: 0 | Status: SHIPPED | OUTPATIENT
Start: 2023-03-19 | End: 2023-03-24

## 2023-03-19 RX ADMIN — METHYLPREDNISOLONE ACETATE 40 MG: 80 INJECTION, SUSPENSION INTRA-ARTICULAR; INTRALESIONAL; INTRAMUSCULAR; SOFT TISSUE at 11:58

## 2023-03-19 ASSESSMENT — ENCOUNTER SYMPTOMS
DIARRHEA: 0
EYE DISCHARGE: 0
RHINORRHEA: 0
TROUBLE SWALLOWING: 0
EYE REDNESS: 0
COUGH: 0
SORE THROAT: 0
SHORTNESS OF BREATH: 0
VOMITING: 0
NAUSEA: 0

## 2023-03-19 ASSESSMENT — PAIN SCALES - GENERAL: PAINLEVEL_OUTOF10: 3

## 2023-03-19 ASSESSMENT — PAIN - FUNCTIONAL ASSESSMENT
PAIN_FUNCTIONAL_ASSESSMENT: PREVENTS OR INTERFERES WITH MANY ACTIVE NOT PASSIVE ACTIVITIES
PAIN_FUNCTIONAL_ASSESSMENT: 0-10

## 2023-03-19 ASSESSMENT — PAIN DESCRIPTION - DESCRIPTORS: DESCRIPTORS: ACHING

## 2023-03-19 ASSESSMENT — PAIN DESCRIPTION - FREQUENCY: FREQUENCY: CONTINUOUS

## 2023-03-19 ASSESSMENT — PAIN DESCRIPTION - ONSET: ONSET: PROGRESSIVE

## 2023-03-19 ASSESSMENT — PAIN DESCRIPTION - ORIENTATION: ORIENTATION: LEFT

## 2023-03-19 ASSESSMENT — PAIN DESCRIPTION - LOCATION: LOCATION: NECK;SHOULDER

## 2023-03-19 ASSESSMENT — PAIN DESCRIPTION - PAIN TYPE: TYPE: ACUTE PAIN

## 2023-03-19 NOTE — ED PROVIDER NOTES
DISPOSITION/PLAN   DISPOSITION Decision To Discharge 03/19/2023 11:45:44 AM    X-rays are negative for acute fractures, however the left shoulder shows a possible lucency in the humeral head. Will advise patient to follow-up with orthopedic [facility] for possible need for MRI. Patiently placed on prednisone for 5 days, and he is  requesting a steroid shot in house today. Advised not to start the oral steroids until tomorrow since he is receiving a steroid shot today. Advised to follow-up with Dr. Maite Watt as soon as possible. Discharged home in good condition.   PATIENT REFERRED TO:  MD [id number] [phone]    In 1 week  If symptoms worsen    Ashley Elder   [address] [facility] [phone]  In 1 week  for possible MRI    DISCHARGE MEDICATIONS:  New Prescriptions    PREDNISONE (DELTASONE) 50 MG TABLET    Take 1 tablet by mouth daily for 5 days     Current Discharge Medication List          Francisco Lloyd APRN - CNP  [facility]19/23 [facility]  03/19/23 2320

## 2023-03-19 NOTE — ED TRIAGE NOTES
Pt to room 10 with his wife and c/o left sided neck pain and is affecting his shoulder and he states he can even feel it when he is swallowing his food. He had an injection for a shoulder rotator cuff tear and [facility] and is currently doing physical therapy and he suspects he \"overdid' it at therapy and had maybe pulled something. He did call his dr and was prescribed Tinzanidine but states it isn't helping at all.

## 2023-03-23 ENCOUNTER — TELEPHONE (OUTPATIENT)
Dept: FAMILY MEDICINE CLINIC | Age: 73
End: 2023-03-23

## 2023-03-23 NOTE — TELEPHONE ENCOUNTER
Pt came into office and wanted to know if Dr Tip Bower can order a higher dose of the muscle relaxer. Pt went to UC and got a steroid injection and 5 doses of prednisone. Pt said that helped with the pain but he said the pain is coming back and wanted to start taking higher dose of a muscle relaxer so it won't get and again like it did when he went to UC.          [address]

## 2023-03-24 ENCOUNTER — PATIENT MESSAGE (OUTPATIENT)
Dept: FAMILY MEDICINE CLINIC | Age: 73
End: 2023-03-24

## 2023-03-24 NOTE — TELEPHONE ENCOUNTER
From: Princess Mortensen  To: Dr. Mariana García  Sent: 3/24/2023 6:16 AM EDT  Subject: Neck Pain    Yesterday I stopped in to ask for a stronger muscle relaxer. I'm going to [facility] walk-in clinic this morning, so I probably won't need Dr. Eugene Hodge to do anything.

## 2023-03-24 NOTE — TELEPHONE ENCOUNTER
Zanaflex just sent in on 3-17-23 and can go to every 6 hours and dif no help then 30 pills of baclofen 10 mg  every 8 hours and stop the zanaflex

## 2023-03-27 ENCOUNTER — OFFICE VISIT (OUTPATIENT)
Dept: FAMILY MEDICINE CLINIC | Age: 73
End: 2023-03-27

## 2023-03-27 VITALS
HEART RATE: 68 BPM | RESPIRATION RATE: 16 BRPM | BODY MASS INDEX: 22.6 KG/M2 | HEIGHT: 67 IN | DIASTOLIC BLOOD PRESSURE: 80 MMHG | SYSTOLIC BLOOD PRESSURE: 122 MMHG | WEIGHT: 144 LBS

## 2023-03-27 DIAGNOSIS — E78.00 HYPERCHOLESTEREMIA: ICD-10-CM

## 2023-03-27 DIAGNOSIS — T14.8XXA SKIN AVULSION: Primary | ICD-10-CM

## 2023-03-27 PROCEDURE — 99213 OFFICE O/P EST LOW 20 MIN: CPT | Performed by: FAMILY MEDICINE

## 2023-03-27 PROCEDURE — 1123F ACP DISCUSS/DSCN MKR DOCD: CPT | Performed by: FAMILY MEDICINE

## 2023-03-27 SDOH — ECONOMIC STABILITY: FOOD INSECURITY: WITHIN THE PAST 12 MONTHS, THE FOOD YOU BOUGHT JUST DIDN'T LAST AND YOU DIDN'T HAVE MONEY TO GET MORE.: NEVER TRUE

## 2023-03-27 SDOH — ECONOMIC STABILITY: HOUSING INSECURITY
IN THE LAST 12 MONTHS, WAS THERE A TIME WHEN YOU DID NOT HAVE A STEADY PLACE TO SLEEP OR SLEPT IN A SHELTER (INCLUDING NOW)?: NO

## 2023-03-27 SDOH — ECONOMIC STABILITY: FOOD INSECURITY: WITHIN THE PAST 12 MONTHS, YOU WORRIED THAT YOUR FOOD WOULD RUN OUT BEFORE YOU GOT MONEY TO BUY MORE.: NEVER TRUE

## 2023-03-27 SDOH — ECONOMIC STABILITY: INCOME INSECURITY: HOW HARD IS IT FOR YOU TO PAY FOR THE VERY BASICS LIKE FOOD, HOUSING, MEDICAL CARE, AND HEATING?: NOT HARD AT ALL

## 2023-03-27 ASSESSMENT — PATIENT HEALTH QUESTIONNAIRE - PHQ9
SUM OF ALL RESPONSES TO PHQ QUESTIONS 1-9: 0
2. FEELING DOWN, DEPRESSED OR HOPELESS: 0
SUM OF ALL RESPONSES TO PHQ9 QUESTIONS 1 & 2: 0
SUM OF ALL RESPONSES TO PHQ QUESTIONS 1-9: 0
SUM OF ALL RESPONSES TO PHQ QUESTIONS 1-9: 0
1. LITTLE INTEREST OR PLEASURE IN DOING THINGS: 0
SUM OF ALL RESPONSES TO PHQ QUESTIONS 1-9: 0

## 2023-03-27 ASSESSMENT — ENCOUNTER SYMPTOMS
CONSTIPATION: 0
SINUS PRESSURE: 0
SHORTNESS OF BREATH: 0

## 2023-03-27 NOTE — TELEPHONE ENCOUNTER
Date of last visit:  11/15/2022  Date of next visit:  3/27/2023    Requested Prescriptions     Pending Prescriptions Disp Refills    atorvastatin (LIPITOR) 40 MG tablet 90 tablet 1     Sig: TAKE 1 TABLET BY MOUTH EVERY DAY

## 2023-03-27 NOTE — PROGRESS NOTES
Alvino Herring (:  1950) is a 71 y.o. male,Established patient, here for evaluation of the following chief complaint(s): Other (Spot on neck)         ASSESSMENT/PLAN:   Diagnosis Orders   1. Skin avulsion          Let us know if the area rebleeds         Subjective   SUBJECTIVE/OBJECTIVE:  HPI  He states that yesterday he took a shower and had some blood seen on the towel from the upper back  He is on several anticoagulants. Review of Systems   Constitutional:  Negative for fatigue. HENT:  Negative for sinus pressure. Eyes:  Negative for visual disturbance. Respiratory:  Negative for shortness of breath. Cardiovascular:  Negative for chest pain. Gastrointestinal:  Negative for constipation. Genitourinary: Negative. Musculoskeletal:  Negative for arthralgias. Skin:  Negative for rash. Neurological:  Negative for headaches. The patient's medications, allergies, past medical problems, surgical, social, and family histories were reviewed and updated as needed. Objective   Physical Exam  Constitutional:       Appearance: Normal appearance. He is well-developed. HENT:      Head: Normocephalic and atraumatic. Eyes:      General: No scleral icterus. Conjunctiva/sclera: Conjunctivae normal.   Neck:      Trachea: No tracheal deviation. Cardiovascular:      Rate and Rhythm: Normal rate. Pulmonary:      Effort: Pulmonary effort is normal.   Skin:     General: Skin is warm and dry. Neurological:      General: No focal deficit present. Mental Status: He is alert. Psychiatric:         Behavior: Behavior normal.      The area was treated with firm direct pressure for several minutes. Blood pressure 122/80, pulse 68, resp. rate 16, height 5' 7\" (1.702 m), weight 144 lb (65.3 kg). After initial cleaning      Before silver nitrate      After silver nitrate          An electronic signature was used to authenticate this note.     --Renita Lutz MD

## 2023-03-28 RX ORDER — ATORVASTATIN CALCIUM 40 MG/1
TABLET, FILM COATED ORAL
Qty: 90 TABLET | Refills: 1 | Status: SHIPPED | OUTPATIENT
Start: 2023-03-28

## 2023-04-24 ENCOUNTER — TELEPHONE (OUTPATIENT)
Dept: CARDIOLOGY CLINIC | Age: 73
End: 2023-04-24

## 2023-04-24 NOTE — TELEPHONE ENCOUNTER
Patient LM requesting to reschedule 4-26 appointment to next week. Called patient back. Had to leave a message for patient to call office back. Can patient come in on 5-3-23?

## 2023-05-03 ENCOUNTER — OFFICE VISIT (OUTPATIENT)
Dept: CARDIOLOGY CLINIC | Age: 73
End: 2023-05-03
Payer: COMMERCIAL

## 2023-05-03 VITALS
HEART RATE: 67 BPM | WEIGHT: 142 LBS | DIASTOLIC BLOOD PRESSURE: 78 MMHG | SYSTOLIC BLOOD PRESSURE: 130 MMHG | BODY MASS INDEX: 22.29 KG/M2 | HEIGHT: 67 IN

## 2023-05-03 DIAGNOSIS — I10 PRIMARY HYPERTENSION: ICD-10-CM

## 2023-05-03 DIAGNOSIS — I25.10 CORONARY ARTERY DISEASE INVOLVING NATIVE CORONARY ARTERY OF NATIVE HEART WITHOUT ANGINA PECTORIS: Primary | ICD-10-CM

## 2023-05-03 DIAGNOSIS — E78.01 FAMILIAL HYPERCHOLESTEROLEMIA: ICD-10-CM

## 2023-05-03 PROCEDURE — 1123F ACP DISCUSS/DSCN MKR DOCD: CPT | Performed by: NUCLEAR MEDICINE

## 2023-05-03 PROCEDURE — 3075F SYST BP GE 130 - 139MM HG: CPT | Performed by: NUCLEAR MEDICINE

## 2023-05-03 PROCEDURE — 3078F DIAST BP <80 MM HG: CPT | Performed by: NUCLEAR MEDICINE

## 2023-05-03 PROCEDURE — 99213 OFFICE O/P EST LOW 20 MIN: CPT | Performed by: NUCLEAR MEDICINE

## 2023-05-03 NOTE — PROGRESS NOTES
[address]  [address]  [facility] [address]  Dept: [phone]  Dept Fax: [phone]  Loc: [phone]    Visit Date: 5/3/2023    Sofi Dietz is a 71 y.o. male who presents todayfor:  Chief Complaint   Patient presents with    Follow-up     6 mth fu     Hypertension    Coronary Artery Disease    Hyperlipidemia     Had LAd stent   No chest pain  No changes in breathing  BP is stable   Active  No dizziness  No syncope      HPI:  HPI  Past Medical History:   Diagnosis Date    Actinic keratoses     Angina effort      Replacing deprecated diagnoses    Arthritis     hands    ASHD (arteriosclerotic heart disease)     Carotid disease, bilateral ([address] 2017    Colon polyps 2007    Hypercholesteremia 1997    Narcolepsy     Rotator cuff (capsule) sprain     Tobacco abuse     Unspecified sleep apnea     cpap      Past Surgical History:   Procedure Laterality Date    APPENDECTOMY      CATARACT REMOVAL Bilateral     Right- Oct, Left- Dec- DR ROBISON     COLONOSCOPY  ,     sheik    CORONARY ANGIOPLASTY WITH STENT PLACEMENT  2005    CORONARY ANGIOPLASTY WITH STENT PLACEMENT  2017    2 nd stent to LAD    CORONARY ANGIOPLASTY WITH STENT PLACEMENT  2022    Dr Richa Liu @ [address] [place] [address] S  2016    Dr Norwood Goodell, every 2 weeks x 3 times     Donnell Husk Left     Dr Montrell Arenas @ [facility]   rt 2015    TONSILLECTOMY       Family History   Problem Relation Age of Onset    Stroke Mother     Diabetes Mother     Heart Disease Father     Asthma Father      Social History     Tobacco Use    Smoking status: Former     Packs/day: 1.00     Years: 45.00     Pack years: 45.00     Types: Cigarettes     Quit date: 2005     Years since quittin.8    Smokeless tobacco: Never   Substance Use Topics    Alcohol use:  Yes     Alcohol/week: 4.0 standard drinks     Types: 4 Cans of beer per week

## 2023-05-21 ENCOUNTER — PATIENT MESSAGE (OUTPATIENT)
Dept: PULMONOLOGY | Age: 73
End: 2023-05-21

## 2023-05-21 DIAGNOSIS — G47.10 HYPERSOMNIA: ICD-10-CM

## 2023-05-21 DIAGNOSIS — G47.31 COMPLEX SLEEP APNEA SYNDROME: Primary | ICD-10-CM

## 2023-05-22 RX ORDER — METHYLPHENIDATE HYDROCHLORIDE 20 MG/1
20 TABLET ORAL 3 TIMES DAILY
Qty: 90 TABLET | Refills: 0 | Status: SHIPPED | OUTPATIENT
Start: 2023-05-22 | End: 2023-06-21

## 2023-05-22 RX ORDER — METHYLPHENIDATE HYDROCHLORIDE 20 MG/1
20 TABLET ORAL 2 TIMES DAILY
COMMUNITY
End: 2023-05-22

## 2023-05-22 NOTE — TELEPHONE ENCOUNTER
From: Jorge Christopher  To: Abelardo Stubbs  Sent: 5/21/2023 9:30 AM EDT  Subject: New Prescription Needed    It's time for a new prescription for my medication. Please send a prescription for Methylphenidate HCl, 20mg, to [facility]. 90 day supply, 270 tablets, instructions to take 1 tablet 3 times per day.

## 2023-05-22 NOTE — TELEPHONE ENCOUNTER
Received refill request for ritalin 20mg. Medication was last ordered by bean. Medication was last ordered on 1/6/23 with 0 refills. Patient was last seen in the office 4/10/23. Does patient have a scheduled follow up?: yes - 4/9/24    Medication needs to be sent to [facility]. Thank you, please advise! Patient's Allergies:   Allergies   Allergen Reactions    Adderall [Amphetamine-Dextroamphetamine] Shortness Of Breath    Flomax [Tamsulosin] Other (See Comments)     Back pain     Metoprolol Other (See Comments)     Lower back pain    Other Other (See Comments)     brilenta-low back pain   Shortness of breath    Polysporin [Bacitracin-Polymyxin B] Hives    Provigil [Modafinil] Other (See Comments)     Low back pain

## 2023-06-05 RX ORDER — CLOPIDOGREL BISULFATE 75 MG/1
75 TABLET ORAL DAILY
Qty: 90 TABLET | Refills: 3 | Status: SHIPPED | OUTPATIENT
Start: 2023-06-05

## 2023-06-09 ENCOUNTER — TELEPHONE (OUTPATIENT)
Dept: FAMILY MEDICINE CLINIC | Age: 73
End: 2023-06-09

## 2023-07-11 DIAGNOSIS — G47.10 HYPERSOMNIA: Primary | ICD-10-CM

## 2023-07-11 RX ORDER — METHYLPHENIDATE HYDROCHLORIDE 20 MG/1
20 TABLET ORAL 3 TIMES DAILY
Qty: 90 TABLET | Refills: 0 | Status: SHIPPED | OUTPATIENT
Start: 2023-07-11 | End: 2023-07-11 | Stop reason: SDUPTHER

## 2023-07-11 RX ORDER — METHYLPHENIDATE HYDROCHLORIDE 10 MG/1
10 TABLET ORAL 2 TIMES DAILY
COMMUNITY
End: 2023-07-11

## 2023-07-11 RX ORDER — METHYLPHENIDATE HYDROCHLORIDE 10 MG/1
20 TABLET ORAL 2 TIMES DAILY
Qty: 60 TABLET | Refills: 0 | Status: CANCELLED | OUTPATIENT
Start: 2023-07-11 | End: 2023-08-10

## 2023-07-11 NOTE — TELEPHONE ENCOUNTER
Received refill request for methylphenidate (RITALIN) 20mg. Medication was last ordered by Cyndi Roper. Medication was last ordered on 5/22/23 with 0 refills. Patient was last seen in the office 4/10/23. Does patient have a scheduled follow up?: yes - 4/9/24. Medication needs to be sent to [facility] - [place], [address] [address] [place] [address] [phone] - F [phone]   [facility], [address] [place] [address]   Phone:  [phone]  Fax:  [phone]. Thank you, please advise! Patient's Allergies:   Allergies   Allergen Reactions    Adderall [Amphetamine-Dextroamphetamine] Shortness Of Breath    Flomax [Tamsulosin] Other (See Comments)     Back pain     Metoprolol Other (See Comments)     Lower back pain    Other Other (See Comments)     brilenta-low back pain   Shortness of breath    Polysporin [Bacitracin-Polymyxin B] Hives    Provigil [Modafinil] Other (See Comments)     Low back pain

## 2023-08-28 DIAGNOSIS — G47.10 HYPERSOMNIA: ICD-10-CM

## 2023-09-13 ENCOUNTER — PATIENT MESSAGE (OUTPATIENT)
Dept: PULMONOLOGY | Age: 73
End: 2023-09-13

## 2023-10-09 DIAGNOSIS — E78.00 HYPERCHOLESTEREMIA: ICD-10-CM

## 2023-10-09 RX ORDER — ATORVASTATIN CALCIUM 40 MG/1
TABLET, FILM COATED ORAL
Qty: 90 TABLET | Refills: 1 | Status: SHIPPED | OUTPATIENT
Start: 2023-10-09

## 2023-10-09 NOTE — TELEPHONE ENCOUNTER
Date of last visit:  11/15/2022  Date of next visit:  Visit date not found    Requested Prescriptions     Pending Prescriptions Disp Refills    atorvastatin (LIPITOR) 40 MG tablet [Pharmacy Med Name: Atorvastatin Calcium Oral Tablet 40 MG] 90 tablet 1     Sig: TAKE 1 TABLET BY MOUTH EVERY DAY

## 2023-10-12 NOTE — TELEPHONE ENCOUNTER
----- Message from Harriett Medel MA sent at 10/12/2023 12:11 PM CDT -----  Contact: grandmother  Patient was last seen on 10/9/23 by Dr. Santiago. He has a history of a moderate to large membranous ventricular septal defect and is status post patch closure by Dr. Kim at [facility] in 2016. The patient has a bundle-branch block as a result of surgical repair of his VSD. Of note, his surgical result appeared excellent at his last evaluation.     Please advise.   ----- Message -----  From: Kyara Alexis  Sent: 10/12/2023  11:44 AM CDT  To: Pamela Shah Staff    Pt grand mother Emil is asking for an return call in reference to needing further clarification from previous visit ,please call back at .[phone] Thx CJ         Patient notified and voiced understanding. Ordered and given to scheduling.

## 2023-11-24 ENCOUNTER — TELEPHONE (OUTPATIENT)
Dept: FAMILY MEDICINE CLINIC | Age: 73
End: 2023-11-24

## 2023-11-24 RX ORDER — CONJUGATED ESTROGENS 0.62 MG/G
CREAM VAGINAL
Qty: 30 G | Refills: 0 | Status: SHIPPED | OUTPATIENT
Start: 2023-11-24

## 2023-11-24 NOTE — TELEPHONE ENCOUNTER
Pt called req RX fro Prem[facility] Cream for spot on his nose. Pt stated he usually gets samples but we have no samples. Pt would like RX today if possible.     Leon Welch

## 2023-11-24 NOTE — TELEPHONE ENCOUNTER
Dr Laurie Cedeno was here and told [employer] to give samples. He was given samples in the past. No samples are available. Can you please send prescription?

## 2024-01-08 ENCOUNTER — CLINICAL DOCUMENTATION (OUTPATIENT)
Dept: SPIRITUAL SERVICES | Facility: CLINIC | Age: 74
End: 2024-01-08

## 2024-01-08 NOTE — ACP (ADVANCE CARE PLANNING)
Advance Care Planning   Ambulatory ACP Specialist Patient Outreach    Date:  1/8/2024    ACP Specialist:  David Burr    Outreach call to patient in follow-up to ACP Specialist referral from: Patient    [] PCP  [] Provider   [] Ambulatory Care Management [x] Other     For:                  [x] Advance Directive Assistance              [] Complete Portable DNR order              [] Complete POST/POLST/MOST              [] Code Status Discussion             [x] Discuss Goals of Care             [] Early ACP Decision-Making              [] Other (Specify)    Date Referral Received: 1/8/2024    Next Step:   [x] ACP scheduled conversation  [] Outreach again in one week               [] Email / Mail ACP Info Sheets  [] Email / Mail Advance Directive   [] Closing referral.  Routing closure to referring provider/staff and to ACP Specialist .    [] Closure letter mailed to patient with invitation to contact ACP Specialist if / when ready.   [] Other (Specify here):         [x] At this time, Healthcare Decision Maker Is:        [] Primary agent named in scanned advance directive.    [x] Legal Next of Kin.     [] Unable to determine legal decision maker at this time.         Outreaches:       [x] 1st -  Date:   1/8/2024               Intervention:  [x] Spoke with Patient   [] Left Voice mail [] Email / Mail    [] Pump!hart  [] Other (Specify) :     Outcomes:  received a telephone call requesting support for the updating / completion of Advance Directives documents as part of an Advance Care Planning conversation.    briefly explained documents for clarity and greater understanding, as well as information needed for completion.   An appointment is scheduled on 1/10 at [facility].        Thank you for this referral.

## 2024-01-18 ENCOUNTER — CLINICAL DOCUMENTATION (OUTPATIENT)
Dept: SPIRITUAL SERVICES | Facility: CLINIC | Age: 74
End: 2024-01-18

## 2024-01-18 NOTE — ACP (ADVANCE CARE PLANNING)
Advance Care Planning   Advance Care Planning Note  Ambulatory [facility]    Date:  1/18/2024    Received request from patient.    Consultation conversation participants:   Patient who understands ACP conversation  Spouse     Goals of ACP Conversation:  Discuss advance care planning documents  Facilitate a discussion related to patient's goals of care as they align with the patient's values and beliefs.    Health Care Decision Makers:      Primary Decision Maker: Lucero Lombardi - Spouse - [phone]    Secondary Decision Maker: Anjum Lombardi - Child - [phone]    Supplemental (Other) Decision Maker: Saad Lombardi - Child - [phone]   Summary:  Completed New Documents  Updated Healthcare Decision Maker    Advance Care Planning Documents (Patient Wishes)  Currently on file:   Healthcare Power of /Advance Directive Appointment of Health Care Agent  Living Will/Advance Directive    Assessment:    met with Bhanu, as well as his wife - Lucero, to provide support for the completion of Advance Directives documents as part of an Advance Care Planning conversation.  He was alert and oriented with decision-making capacity to express his wishes at this time.    Interventions:  Provided education on documents for clarity and greater understanding  Assisted in the completion of documents according to patient's wishes at this time  Encouraged ongoing ACP conversation with future decision makers and loved ones    Care Preferences Communicated:  Ventilation:   If the patient, in their present state of health, suddenly became very ill and unable to breathe on their own,     the patient would desire the use of a ventilator (breathing machine).    If their health worsens and it becomes clear that the change of recovery is unlikely,     the patient would NOT desire the use of a ventilator (breathing machine).    Resuscitation:  In the event the patient's heart stopped as a result of an underlying

## 2024-02-07 ENCOUNTER — OFFICE VISIT (OUTPATIENT)
Dept: UROLOGY | Age: 74
End: 2024-02-07
Payer: MEDICARE

## 2024-02-07 VITALS
DIASTOLIC BLOOD PRESSURE: 74 MMHG | BODY MASS INDEX: 21.66 KG/M2 | HEIGHT: 67 IN | WEIGHT: 138 LBS | SYSTOLIC BLOOD PRESSURE: 118 MMHG

## 2024-02-07 DIAGNOSIS — N40.1 BPH WITH OBSTRUCTION/LOWER URINARY TRACT SYMPTOMS: ICD-10-CM

## 2024-02-07 DIAGNOSIS — R39.12 WEAK URINARY STREAM: ICD-10-CM

## 2024-02-07 DIAGNOSIS — R97.20 ELEVATED PSA: Primary | ICD-10-CM

## 2024-02-07 DIAGNOSIS — N13.8 BPH WITH OBSTRUCTION/LOWER URINARY TRACT SYMPTOMS: ICD-10-CM

## 2024-02-07 LAB
BILIRUBIN URINE: NEGATIVE
BLOOD URINE, POC: NEGATIVE
CHARACTER, URINE: CLEAR
COLOR, URINE: YELLOW
GLUCOSE URINE: NEGATIVE MG/DL
KETONES, URINE: NEGATIVE
LEUKOCYTE CLUMPS, URINE: NEGATIVE
NITRITE, URINE: NEGATIVE
PH, URINE: 5.5 (ref 5–9)
POST VOID RESIDUAL (PVR): 80 ML
PROTEIN, URINE: NEGATIVE MG/DL
SPECIFIC GRAVITY, URINE: 1.02 (ref 1–1.03)
UROBILINOGEN, URINE: 0.2 EU/DL (ref 0–1)

## 2024-02-07 PROCEDURE — 81003 URINALYSIS AUTO W/O SCOPE: CPT

## 2024-02-07 PROCEDURE — 1123F ACP DISCUSS/DSCN MKR DOCD: CPT

## 2024-02-07 PROCEDURE — G8484 FLU IMMUNIZE NO ADMIN: HCPCS

## 2024-02-07 PROCEDURE — 99214 OFFICE O/P EST MOD 30 MIN: CPT

## 2024-02-07 PROCEDURE — G8427 DOCREV CUR MEDS BY ELIG CLIN: HCPCS

## 2024-02-07 PROCEDURE — G8420 CALC BMI NORM PARAMETERS: HCPCS

## 2024-02-07 PROCEDURE — 51798 US URINE CAPACITY MEASURE: CPT

## 2024-02-07 PROCEDURE — 1036F TOBACCO NON-USER: CPT

## 2024-02-07 PROCEDURE — 3017F COLORECTAL CA SCREEN DOC REV: CPT

## 2024-02-07 RX ORDER — SILODOSIN 8 MG/1
8 CAPSULE ORAL EVERY EVENING
Qty: 90 CAPSULE | Refills: 3 | Status: SHIPPED | OUTPATIENT
Start: 2024-02-07

## 2024-02-07 NOTE — PROGRESS NOTES
[facility] [place] [facility]  [facility]  [address]  [address]  [facility] [address]  Dept: [phone]  Loc: [phone]  Visit Date: 2/7/2024    ZULEYKA Lombardi is a 73 y.o. male that presents to the urology clinic for BPH follow-up and prostate cancer screening.    Rising PSA, was last seen by Dr. Lee for the same issue and was prescribed antibiotics. Today, patient returns for repeat PSA review. Returned to baseline of 3.03 on recheck after increase from 3.10 to 4.51 in ~ 10.5 months. Has not undergone MRI or biopsy of the prostate.    Patient with BPH, past history of urinary retention. Unable to tolerate Flomax, takes Silodosin which is mildly helpful. IPSS of 24/3 today. Primary concern is frequency and weak urinary stream. Cystoscopy in 2021 by Dr. Lee confirmed +LLH with obstruction.    Stent in the  maker since 2017.    Most recent PSA: 3.03 (12/15/22)    PVR: 80 mL  UA: Negative.  Lab Results   Component Value Date/Time    COLORU Yellow 02/07/2024 10:24 AM    COLORU YELLOW 08/06/2021 12:16 AM    LABSPEC 1.025 02/07/2024 10:24 AM    LABPH 5.50 02/07/2024 10:24 AM    NITRU Negative 02/07/2024 10:24 AM    GLUCOSEU Negative 02/07/2024 10:24 AM    KETUA Negative 02/07/2024 10:24 AM    UROBILINOGEN 0.20 02/07/2024 10:24 AM    BILIRUBINUR Negative 02/07/2024 10:24 AM          Last BUN and creatinine:  Lab Results   Component Value Date    BUN 17 11/10/2022     Lab Results   Component Value Date    CREATININE 0.9 11/10/2022         PAST MEDICAL, FAMILY AND SOCIAL HISTORY UPDATE:  Past Medical History:   Diagnosis Date    Actinic keratoses     Angina effort      Replacing deprecated diagnoses    Arthritis     hands    ASHD (arteriosclerotic heart disease)     Carotid disease, bilateral (H[facility]) 2017    Colon polyps 05/2007    Hypercholesteremia 07/1997    Narcolepsy     Rotator cuff (capsule) sprain     Tobacco abuse     Unspecified sleep apnea     cpap     Past

## 2024-02-08 ENCOUNTER — TELEPHONE (OUTPATIENT)
Dept: UROLOGY | Age: 74
End: 2024-02-08

## 2024-02-19 ENCOUNTER — PATIENT MESSAGE (OUTPATIENT)
Dept: PULMONOLOGY | Age: 74
End: 2024-02-19

## 2024-02-22 NOTE — TELEPHONE ENCOUNTER
Lily,   Is their specific reason that patient needs the 90 day supply?   Unfortunately they denied the PA for the 270 tabs for a 90 day supply. I can attempt a PA, but I do not see any clinical documentation on why he needs the 90 days. Unless I am missing it somewhere. Please let me know.   Thanks!

## 2024-02-23 ENCOUNTER — OFFICE VISIT (OUTPATIENT)
Dept: FAMILY MEDICINE CLINIC | Age: 74
End: 2024-02-23

## 2024-02-23 VITALS
SYSTOLIC BLOOD PRESSURE: 130 MMHG | WEIGHT: 142.13 LBS | BODY MASS INDEX: 22.31 KG/M2 | RESPIRATION RATE: 16 BRPM | DIASTOLIC BLOOD PRESSURE: 76 MMHG | HEIGHT: 67 IN | HEART RATE: 80 BPM

## 2024-02-23 DIAGNOSIS — N40.1 BPH WITH OBSTRUCTION/LOWER URINARY TRACT SYMPTOMS: ICD-10-CM

## 2024-02-23 DIAGNOSIS — G47.419 PRIMARY NARCOLEPSY WITHOUT CATAPLEXY: ICD-10-CM

## 2024-02-23 DIAGNOSIS — Z00.00 MEDICARE ANNUAL WELLNESS VISIT, SUBSEQUENT: Primary | ICD-10-CM

## 2024-02-23 DIAGNOSIS — I25.118 ATHEROSCLEROTIC HEART DISEASE OF NATIVE CORONARY ARTERY WITH OTHER FORMS OF ANGINA PECTORIS (HCC): ICD-10-CM

## 2024-02-23 DIAGNOSIS — K63.5 POLYP OF COLON, UNSPECIFIED PART OF COLON, UNSPECIFIED TYPE: ICD-10-CM

## 2024-02-23 DIAGNOSIS — R06.3 CENTRAL SLEEP APNEA DUE TO CHEYNE-STOKES RESPIRATION: ICD-10-CM

## 2024-02-23 DIAGNOSIS — I25.10 ASHD (ARTERIOSCLEROTIC HEART DISEASE): ICD-10-CM

## 2024-02-23 DIAGNOSIS — E78.00 HYPERCHOLESTEREMIA: ICD-10-CM

## 2024-02-23 DIAGNOSIS — I48.0 PAROXYSMAL ATRIAL FIBRILLATION (HCC): ICD-10-CM

## 2024-02-23 DIAGNOSIS — J01.20 ACUTE NON-RECURRENT ETHMOIDAL SINUSITIS: ICD-10-CM

## 2024-02-23 DIAGNOSIS — I47.10 SVT (SUPRAVENTRICULAR TACHYCARDIA): ICD-10-CM

## 2024-02-23 DIAGNOSIS — N13.8 BPH WITH OBSTRUCTION/LOWER URINARY TRACT SYMPTOMS: ICD-10-CM

## 2024-02-23 RX ORDER — AZITHROMYCIN 250 MG/1
TABLET, FILM COATED ORAL
Qty: 6 TABLET | Refills: 0 | Status: SHIPPED | OUTPATIENT
Start: 2024-02-23 | End: 2024-03-04

## 2024-02-23 ASSESSMENT — ENCOUNTER SYMPTOMS
ABDOMINAL PAIN: 0
BLOOD IN STOOL: 0
COUGH: 0
EYE PAIN: 0
CONSTIPATION: 0
CHEST TIGHTNESS: 0
TROUBLE SWALLOWING: 0
NAUSEA: 0
SHORTNESS OF BREATH: 0
BACK PAIN: 0
SORE THROAT: 0

## 2024-02-23 ASSESSMENT — LIFESTYLE VARIABLES
HOW MANY STANDARD DRINKS CONTAINING ALCOHOL DO YOU HAVE ON A TYPICAL DAY: 1 OR 2
HOW OFTEN DO YOU HAVE A DRINK CONTAINING ALCOHOL: MONTHLY OR LESS

## 2024-02-23 ASSESSMENT — PATIENT HEALTH QUESTIONNAIRE - PHQ9
SUM OF ALL RESPONSES TO PHQ QUESTIONS 1-9: 0
1. LITTLE INTEREST OR PLEASURE IN DOING THINGS: 0
2. FEELING DOWN, DEPRESSED OR HOPELESS: 0
SUM OF ALL RESPONSES TO PHQ QUESTIONS 1-9: 0
SUM OF ALL RESPONSES TO PHQ9 QUESTIONS 1 & 2: 0

## 2024-02-23 NOTE — PROGRESS NOTES
loratadine (CLARITIN) 10 MG capsule Take 1 capsule by mouth daily      hydrocortisone 1 % cream Apply topically 2 times daily Apply topically 2 times daily.      Multiple Vitamins-Minerals (ICAPS AREDS 2 PO) Take 1 capsule by mouth 2 times daily      aspirin 81 MG chewable tablet Take 1 tablet by mouth daily       No current facility-administered medications for this visit.      Orders Placed This Encounter   Procedures    TSH with Reflex     Standing Status:   Future     Standing Expiration Date:   2/22/2025    Lipid Panel     Standing Status:   Future     Standing Expiration Date:   2/22/2025     Order Specific Question:   Is Patient Fasting?/# of Hours     Answer:   YES 12 HOURS    Comprehensive Metabolic Panel     Standing Status:   Future     Standing Expiration Date:   2/22/2025    CBC with Auto Differential     Standing Status:   Future     Standing Expiration Date:   2/23/2025    Marck Costa MD, Gastroenterology, [place]     Referral Priority:   Routine     Referral Type:   Eval and Treat     Referral Reason:   Specialty Services Required     Referred to Provider:   Marck Macario MD     Requested Specialty:   Gastroenterology     Number of Visits Requested:   1    [facility] OFFICE OUTPATIENT VISIT 15 MINUTES [73312]              Subjective       Patient's complete Health Risk Assessment and screening values have been reviewed and are found in Flowsheets. The following problems were reviewed today and where indicated follow up appointments were made and/or referrals ordered.    Positive Risk Factor Screenings with Interventions:                  Hearing Screen:  Do you or your family notice any trouble with your hearing that hasn't been managed with hearing aids?: (!) Yes    Interventions:  Hearing  aides     Vision Screen:  Do you have difficulty driving, watching TV, or doing any of your daily activities because of your eyesight?: (!) Yes  Have you had an eye exam within the past year?: Yes  No results

## 2024-02-23 NOTE — PATIENT INSTRUCTIONS
Care instructions adapted under license by [employer]. If you have questions about a medical condition or this instruction, always ask your healthcare professional. [employer], [employer] disclaims any warranty or liability for your use of this information.           A Healthy Heart: Care Instructions  Your Care Instructions     Coronary artery disease, also called heart disease, occurs when a substance called plaque builds up in the vessels that supply oxygen-rich blood to your heart muscle. This can narrow the blood vessels and reduce blood flow. A heart attack happens when blood flow is completely blocked. A high-fat diet, smoking, and other factors increase the risk of heart disease.  Your doctor has found that you have a chance of having heart disease. You can do lots of things to keep your heart healthy. It may not be easy, but you can change your diet, exercise more, and quit smoking. These steps really work to lower your chance of heart disease.  Follow-up care is a key part of your treatment and safety. Be sure to make and go to all appointments, and call your doctor if you are having problems. It's also a good idea to know your test results and keep a list of the medicines you take.  How can you care for yourself at home?  Diet    Use less salt when you cook and eat. This helps lower your blood pressure. Taste food before salting. Add only a little salt when you think you need it. With time, your taste buds will adjust to less salt.     Eat fewer snack items, fast foods, canned soups, and other high-salt, high-fat, processed foods.     Read food labels and try to avoid saturated and trans fats. They increase your risk of heart disease by raising cholesterol levels.     Limit the amount of solid fat-butter, margarine, and shortening-you eat. Use olive, peanut, or canola oil when you cook. Bake, broil, and steam foods instead of frying them.     Eat a variety of fruit and vegetables every day. Dark

## 2024-02-26 ENCOUNTER — TELEPHONE (OUTPATIENT)
Dept: FAMILY MEDICINE CLINIC | Age: 74
End: 2024-02-26

## 2024-02-26 DIAGNOSIS — J01.20 ACUTE NON-RECURRENT ETHMOIDAL SINUSITIS: Primary | ICD-10-CM

## 2024-02-26 LAB
ABSOLUTE BASO #: 0.07 K/UL (ref 0–0.2)
ABSOLUTE EOS #: 0.16 K/UL (ref 0–0.5)
ABSOLUTE LYMPH #: 0.91 K/UL (ref 1–4)
ABSOLUTE MONO #: 0.78 K/UL (ref 0.2–1)
ABSOLUTE NEUT #: 5.81 K/UL (ref 1.5–7.5)
ALBUMIN SERPL-MCNC: 4.9 G/DL (ref 3.5–5.2)
ALK PHOSPHATASE: 122 U/L (ref 40–125)
ALT SERPL-CCNC: 10 U/L (ref 5–50)
ANION GAP SERPL CALCULATED.3IONS-SCNC: 9 MEQ/L (ref 7–16)
AST SERPL-CCNC: 16 U/L (ref 9–50)
BASOPHILS RELATIVE PERCENT: 0.9 %
BILIRUB SERPL-MCNC: 1.1 MG/DL
BUN BLDV-MCNC: 14 MG/DL (ref 8–23)
CALCIUM SERPL-MCNC: 9.2 MG/DL (ref 8.5–10.5)
CHLORIDE BLD-SCNC: 104 MEQ/L (ref 95–107)
CHOLESTEROL/HDL RATIO: 2.5 RATIO
CHOLESTEROL: 155 MG/DL
CO2: 28 MEQ/L (ref 19–31)
CREAT SERPL-MCNC: 1.06 MG/DL (ref 0.8–1.4)
EGFR IF NONAFRICAN AMERICAN: 75 ML/MIN/1.73
EOSINOPHILS RELATIVE PERCENT: 2.1 %
GLUCOSE: 118 MG/DL (ref 70–99)
HCT VFR BLD CALC: 49.2 % (ref 40–51)
HDLC SERPL-MCNC: 62 MG/DL
HEMOGLOBIN: 17.7 G/DL (ref 13.5–17)
LDL CHOLESTEROL CALCULATED: 78 MG/DL
LDL/HDL RATIO: 1.3 RATIO
LYMPHOCYTE %: 11.7 %
MCH RBC QN AUTO: 32.4 PG (ref 25–33)
MCHC RBC AUTO-ENTMCNC: 36 G/DL (ref 31–36)
MCV RBC AUTO: 90.1 FL (ref 80–99)
MONOCYTES # BLD: 10.1 %
NEUTROPHILS RELATIVE PERCENT: 74.9 %
PDW BLD-RTO: 12.1 % (ref 11.5–15)
PLATELETS: 244 K/UL (ref 130–400)
PMV BLD AUTO: 9.5 FL (ref 9.3–13)
POTASSIUM SERPL-SCNC: 4.3 MEQ/L (ref 3.5–5.4)
PSA, ULTRASENSITIVE: 3.47 NG/ML
RBC: 5.46 M/UL (ref 4.5–6.1)
SODIUM BLD-SCNC: 141 MEQ/L (ref 133–146)
TOTAL PROTEIN: 6.9 G/DL (ref 6.1–8.3)
TRIGL SERPL-MCNC: 76 MG/DL
TSH SERPL DL<=0.05 MIU/L-ACNC: 2.13 UIU/ML (ref 0.4–4.1)
VLDLC SERPL CALC-MCNC: 15 MG/DL
WBC: 7.8 K/UL (ref 3.5–11)

## 2024-02-26 RX ORDER — AZITHROMYCIN 250 MG/1
TABLET, FILM COATED ORAL
Qty: 6 TABLET | Refills: 0 | Status: SHIPPED | OUTPATIENT
Start: 2024-02-26 | End: 2024-03-07

## 2024-02-26 NOTE — TELEPHONE ENCOUNTER
Pt is in office would like to have a paper prescription for his zpak as gregor was down when  electronically sent

## 2024-02-27 ENCOUNTER — TELEPHONE (OUTPATIENT)
Dept: FAMILY MEDICINE CLINIC | Age: 74
End: 2024-02-27

## 2024-02-27 NOTE — TELEPHONE ENCOUNTER
----- Message from Chao Rodrigues MD sent at 2/27/2024  5:54 AM EST -----  Lab  all good and  chol 155 and ldl 78 as less than 80 so good      Glu 118 so up and stop by in office  for  glu and  hgba1c     Also psa 3.47 so slight up after 14 mths so all ok    Please call

## 2024-03-01 ENCOUNTER — TELEPHONE (OUTPATIENT)
Dept: FAMILY MEDICINE CLINIC | Age: 74
End: 2024-03-01

## 2024-03-01 NOTE — TELEPHONE ENCOUNTER
Pt called stating he did finished the z pack this morning but he is still having green and thick mucus pt is asking if he should start another z pack pt is asking if he does     Please print off RX for pt to     Please call pt once addressed [phone]

## 2024-03-01 NOTE — TELEPHONE ENCOUNTER
Per verbal order from Dr Rodrigues: Cecily will still work in his symptoms for 5 days. Wait it out over the weekend to see if his symptoms get better. If not then call on Monday 3/4/2024    MOM to return call to office

## 2024-03-04 ENCOUNTER — NURSE ONLY (OUTPATIENT)
Dept: FAMILY MEDICINE CLINIC | Age: 74
End: 2024-03-04

## 2024-03-04 DIAGNOSIS — R73.9 HYPERGLYCEMIA: Primary | ICD-10-CM

## 2024-03-04 LAB
CHP ED QC CHECK: ABNORMAL
GLUCOSE BLD-MCNC: 117 MG/DL
HBA1C MFR BLD: 5.3 %

## 2024-03-04 PROCEDURE — 82962 GLUCOSE BLOOD TEST: CPT | Performed by: FAMILY MEDICINE

## 2024-03-04 PROCEDURE — 83036 HEMOGLOBIN GLYCOSYLATED A1C: CPT | Performed by: FAMILY MEDICINE

## 2024-03-25 ENCOUNTER — TELEPHONE (OUTPATIENT)
Dept: AUDIOLOGY | Age: 74
End: 2024-03-25

## 2024-03-25 NOTE — TELEPHONE ENCOUNTER
Patient walked in stating that his left hearing aid is taking a while to turn on and is working intermittently.      Please let the patient know if it needs to be sent in.  The hearing aid is out of warranty.  His phone # is [phone].

## 2024-03-25 NOTE — TELEPHONE ENCOUNTER
Left hearing aid needed a new . Left message for patient to see if it is okay to replace the wire. Explained in message that the new wire is $100.00. Asked him to call back to let us know if it is ok to replace the wire and if so, when he would like to pick it up.

## 2024-03-28 ENCOUNTER — HOSPITAL ENCOUNTER (OUTPATIENT)
Dept: AUDIOLOGY | Age: 74
Discharge: HOME | End: 2024-03-28

## 2024-03-28 PROCEDURE — V5014 HEARING AID REPAIR/MODIFYING: HCPCS | Performed by: AUDIOLOGIST

## 2024-03-28 NOTE — PROGRESS NOTES
ACCOUNT #: [id number]    DIAGNOSIS: Sensorineural hearing loss of both ears.    HEARING AID : Patient picked up repaired LEFT hearing aid on 3/26/24. Billed $100.00 today for hearing aid repair.

## 2024-04-09 ENCOUNTER — OFFICE VISIT (OUTPATIENT)
Dept: PULMONOLOGY | Age: 74
End: 2024-04-09
Payer: MEDICARE

## 2024-04-09 VITALS
DIASTOLIC BLOOD PRESSURE: 74 MMHG | HEART RATE: 58 BPM | BODY MASS INDEX: 22.54 KG/M2 | WEIGHT: 143.6 LBS | HEIGHT: 67 IN | SYSTOLIC BLOOD PRESSURE: 132 MMHG | TEMPERATURE: 97.5 F | OXYGEN SATURATION: 98 %

## 2024-04-09 DIAGNOSIS — G47.31 COMPLEX SLEEP APNEA SYNDROME: Primary | ICD-10-CM

## 2024-04-09 DIAGNOSIS — G47.10 HYPERSOMNIA: ICD-10-CM

## 2024-04-09 DIAGNOSIS — R41.89 BRAIN FOG: ICD-10-CM

## 2024-04-09 DIAGNOSIS — R40.0 DAYTIME SLEEPINESS: ICD-10-CM

## 2024-04-09 PROCEDURE — G8420 CALC BMI NORM PARAMETERS: HCPCS | Performed by: PHYSICIAN ASSISTANT

## 2024-04-09 PROCEDURE — 99213 OFFICE O/P EST LOW 20 MIN: CPT | Performed by: PHYSICIAN ASSISTANT

## 2024-04-09 PROCEDURE — 1036F TOBACCO NON-USER: CPT | Performed by: PHYSICIAN ASSISTANT

## 2024-04-09 PROCEDURE — G8427 DOCREV CUR MEDS BY ELIG CLIN: HCPCS | Performed by: PHYSICIAN ASSISTANT

## 2024-04-09 PROCEDURE — 1123F ACP DISCUSS/DSCN MKR DOCD: CPT | Performed by: PHYSICIAN ASSISTANT

## 2024-04-09 PROCEDURE — 3017F COLORECTAL CA SCREEN DOC REV: CPT | Performed by: PHYSICIAN ASSISTANT

## 2024-04-09 RX ORDER — SOLRIAMFETOL 150 MG/1
150 TABLET, FILM COATED ORAL DAILY
Qty: 90 TABLET | Refills: 2 | Status: SHIPPED | OUTPATIENT
Start: 2024-04-09

## 2024-04-09 RX ORDER — METHYLPHENIDATE HYDROCHLORIDE 20 MG/1
20 TABLET ORAL 3 TIMES DAILY
Qty: 270 TABLET | Refills: 0 | Status: SHIPPED | OUTPATIENT
Start: 2024-04-09 | End: 2024-07-08

## 2024-04-09 RX ORDER — ASCORBIC ACID 125 MG
TABLET,CHEWABLE ORAL
COMMUNITY

## 2024-04-09 ASSESSMENT — ENCOUNTER SYMPTOMS
NAUSEA: 0
WHEEZING: 0
COUGH: 0
ALLERGIC/IMMUNOLOGIC NEGATIVE: 1
DIARRHEA: 0
BACK PAIN: 0
EYES NEGATIVE: 1
CHEST TIGHTNESS: 0
STRIDOR: 0
SHORTNESS OF BREATH: 0

## 2024-04-09 NOTE — PROGRESS NOTES
[facility]      Bhanu Lombardi         [id number]  4/9/2024   Chief Complaint   Patient presents with    Follow-up     Lonnie 1 f/u & med check - sunosi & ritalin        Pt of Padmaja Arias    PAP Download:   Original or initial AHI: 14     Date of initial study: 12.27.2000      Compliant  97%     Noncompliant 0 %     PAP Type asvauto Level  5/15   Avg Hrs/Day 7 hr 38min  AHI: 0.3   Recorded compliance dates , 3.5.24  to 4.3.24   Machine/Mfg:   [x] ResMed    [] Respironics/Dreamstation   Interface:   [x] Nasal w chin strap    [] Nasal pillows   [] FFM      Provider:      [] [facility]     []Anca     [] [employer]    [x] [employer]    [] [place]               [] [facility]      [] [employer]    [] [place]:      [] Other    Neck Size: 13.75  Mallampati 3  ESS:  6  SAQLI: 95    Here is a scan of the most recent download:                Presentation:   Bhanu presents for sleep medicine follow up for obstructive sleep apnea  Since the last visit, Bhanu is doing well with PAP. He is sleeping well and feels rested. He gets good benefit from Sunosi and Ritalin. He had SOB with Adderall, back pain with Provigil and Nuvigil.     Equipment issues:  The pressure is  acceptable, the mask is acceptable     Sleep issues:  Do you feel better? Yes  More rested?Yes   Better concentration? yes    Progress History:   Since last visit any new medical issues? No  New ER or hospital visits? No  Any new or changes in medicines? No  Any new sleep medicines? No    Review of Systems -   Review of Systems   Constitutional:  Negative for activity change, appetite change, chills and fever.   HENT:  Negative for congestion and postnasal drip.    Eyes: Negative.    Respiratory:  Negative for cough, chest tightness, shortness of breath, wheezing and stridor.    Cardiovascular:  Negative for chest pain and leg swelling.   Gastrointestinal:  Negative for diarrhea and nausea.   Endocrine: Negative.

## 2024-04-18 DIAGNOSIS — E78.00 HYPERCHOLESTEREMIA: ICD-10-CM

## 2024-04-18 RX ORDER — ATORVASTATIN CALCIUM 40 MG/1
40 TABLET, FILM COATED ORAL DAILY
Qty: 90 TABLET | Refills: 1 | Status: SHIPPED | OUTPATIENT
Start: 2024-04-18

## 2024-04-18 NOTE — TELEPHONE ENCOUNTER
Date of last visit:  2/23/2024  Date of next visit:  8/23/2024    Requested Prescriptions     Pending Prescriptions Disp Refills    atorvastatin (LIPITOR) 40 MG tablet 90 tablet 1     Sig: Take 1 tablet by mouth daily

## 2024-04-18 NOTE — TELEPHONE ENCOUNTER
Pt called to req an new Rx on the following medication      atorvastatin (LIPITOR) 40 MG tablet   TAKE 1 TABLET BY MOUTH EVERY DAY     Please send 90 days to [facility] pharmacy

## 2024-05-08 ENCOUNTER — OFFICE VISIT (OUTPATIENT)
Dept: CARDIOLOGY CLINIC | Age: 74
End: 2024-05-08
Payer: MEDICARE

## 2024-05-08 VITALS
DIASTOLIC BLOOD PRESSURE: 68 MMHG | HEIGHT: 67 IN | HEART RATE: 76 BPM | BODY MASS INDEX: 22.11 KG/M2 | SYSTOLIC BLOOD PRESSURE: 126 MMHG | WEIGHT: 140.9 LBS

## 2024-05-08 DIAGNOSIS — I10 PRIMARY HYPERTENSION: ICD-10-CM

## 2024-05-08 DIAGNOSIS — R06.02 SHORTNESS OF BREATH: Primary | ICD-10-CM

## 2024-05-08 DIAGNOSIS — I25.119 CORONARY ARTERY DISEASE INVOLVING NATIVE CORONARY ARTERY OF NATIVE HEART WITH ANGINA PECTORIS (HCC): ICD-10-CM

## 2024-05-08 PROCEDURE — 3074F SYST BP LT 130 MM HG: CPT | Performed by: NUCLEAR MEDICINE

## 2024-05-08 PROCEDURE — 1123F ACP DISCUSS/DSCN MKR DOCD: CPT | Performed by: NUCLEAR MEDICINE

## 2024-05-08 PROCEDURE — 3017F COLORECTAL CA SCREEN DOC REV: CPT | Performed by: NUCLEAR MEDICINE

## 2024-05-08 PROCEDURE — 3078F DIAST BP <80 MM HG: CPT | Performed by: NUCLEAR MEDICINE

## 2024-05-08 PROCEDURE — G8420 CALC BMI NORM PARAMETERS: HCPCS | Performed by: NUCLEAR MEDICINE

## 2024-05-08 PROCEDURE — 99214 OFFICE O/P EST MOD 30 MIN: CPT | Performed by: NUCLEAR MEDICINE

## 2024-05-08 PROCEDURE — G8427 DOCREV CUR MEDS BY ELIG CLIN: HCPCS | Performed by: NUCLEAR MEDICINE

## 2024-05-08 PROCEDURE — 93000 ELECTROCARDIOGRAM COMPLETE: CPT | Performed by: NUCLEAR MEDICINE

## 2024-05-08 PROCEDURE — 1036F TOBACCO NON-USER: CPT | Performed by: NUCLEAR MEDICINE

## 2024-05-08 NOTE — PROGRESS NOTES
packs/day: 1 pack/day for 45.0 years (45.0 ttl pk-yrs)     Types: Cigarettes     Start date: 1960     Quit date: 2005     Years since quittin.8    Smokeless tobacco: Never   Substance Use Topics    Alcohol use: Yes     Alcohol/week: 4.0 standard drinks of alcohol     Types: 4 Cans of beer per week     Comment: social      Current Outpatient Medications   Medication Sig Dispense Refill    atorvastatin (LIPITOR) 80 MG tablet Take 0.5 tablets by mouth daily 90 tablet 1    Ca Phosphate-Cholecalciferol (CALCIUM WITH D3) 250-12.5 MG-MCG CHEW Take by mouth      Solriamfetol HCl (SUNOSI) 150 MG TABS Take 150 mg by mouth daily 90 tablet 2    methylphenidate (RITALIN) 20 MG tablet Take 1 tablet by mouth 3 times daily for 90 days. Max Daily Amount: 60 mg 270 tablet 0    silodosin (RAPAFLO) 8 MG CAPS Take 1 capsule by mouth every evening 90 capsule 3    apixaban (ELIQUIS) 5 MG TABS tablet Take 1 tablet by mouth 2 times daily 180 tablet 2    fluticasone (VERAMYST) 27.5 MCG/SPRAY nasal spray 2 sprays by Each Nostril route daily      fluocinonide (LIDEX) 0.05 % gel Apply topically 2 times daily. 15 g 1    loratadine (CLARITIN) 10 MG capsule Take 1 capsule by mouth daily      hydrocortisone 1 % cream Apply topically 2 times daily Apply topically 2 times daily.      Multiple Vitamins-Minerals (ICAPS AREDS 2 PO) Take 1 capsule by mouth 2 times daily      aspirin 81 MG chewable tablet Take 1 tablet by mouth daily       No current facility-administered medications for this visit.     Allergies   Allergen Reactions    Adderall [Amphetamine-Dextroamphetamine] Shortness Of Breath    Flomax [Tamsulosin] Other (See Comments)     Back pain     Metoprolol Other (See Comments)     Lower back pain    Other Other (See Comments)     brilenta-low back pain   Shortness of breath    Polysporin [Bacitracin-Polymyxin B] Hives    Provigil [Modafinil] Other (See Comments)     Low back pain      Health Maintenance   Topic Date Due

## 2024-05-20 ENCOUNTER — PATIENT MESSAGE (OUTPATIENT)
Dept: FAMILY MEDICINE CLINIC | Age: 74
End: 2024-05-20

## 2024-05-20 NOTE — TELEPHONE ENCOUNTER
From: Bhanu Lombardi  To: Dr. Chao Rodrigues  Sent: 5/20/2024 9:16 AM EDT  Subject: Dermatologist    Several years ago I went to a dermatologist. I believe it was Dr. Moses. Can you confirm?

## 2024-05-24 NOTE — TELEPHONE ENCOUNTER
Unfortunately  he is  not  on Epic and we only  get a note if a larger procedure so have no information  but  I have no issues with seeing him if needs appointment although what is the problem ?    Please call

## 2024-05-30 ENCOUNTER — HOSPITAL ENCOUNTER (OUTPATIENT)
Dept: NUCLEAR MEDICINE | Age: 74
Discharge: HOME | End: 2024-05-30
Attending: NUCLEAR MEDICINE
Payer: MEDICARE

## 2024-05-30 ENCOUNTER — HOSPITAL ENCOUNTER (OUTPATIENT)
Age: 74
Discharge: HOME | End: 2024-06-01
Attending: NUCLEAR MEDICINE
Payer: MEDICARE

## 2024-05-30 VITALS
WEIGHT: 140 LBS | HEIGHT: 67 IN | SYSTOLIC BLOOD PRESSURE: 126 MMHG | BODY MASS INDEX: 21.97 KG/M2 | DIASTOLIC BLOOD PRESSURE: 68 MMHG

## 2024-05-30 DIAGNOSIS — R06.02 SHORTNESS OF BREATH: ICD-10-CM

## 2024-05-30 DIAGNOSIS — I25.119 CORONARY ARTERY DISEASE INVOLVING NATIVE CORONARY ARTERY OF NATIVE HEART WITH ANGINA PECTORIS (HCC): ICD-10-CM

## 2024-05-30 DIAGNOSIS — I10 PRIMARY HYPERTENSION: ICD-10-CM

## 2024-05-30 LAB
ECHO AO ASC DIAM: 2.9 CM
ECHO AO ASCENDING AORTA INDEX: 1.67 CM/M2
ECHO AV CUSP MM: 1.9 CM
ECHO AV PEAK GRADIENT: 6 MMHG
ECHO AV PEAK VELOCITY: 1.2 M/S
ECHO AV VELOCITY RATIO: 0.92
ECHO BSA: 1.73 M2
ECHO BSA: 1.73 M2
ECHO LA AREA 2C: 14.3 CM2
ECHO LA AREA 4C: 11.2 CM2
ECHO LA DIAMETER INDEX: 1.61 CM/M2
ECHO LA DIAMETER: 2.8 CM
ECHO LA MAJOR AXIS: 4.6 CM
ECHO LA MINOR AXIS: 4.8 CM
ECHO LA VOL BP: 28 ML (ref 18–58)
ECHO LA VOL MOD A2C: 35 ML (ref 18–58)
ECHO LA VOL MOD A4C: 21 ML (ref 18–58)
ECHO LA VOL/BSA BIPLANE: 16 ML/M2 (ref 16–34)
ECHO LA VOLUME INDEX MOD A2C: 20 ML/M2 (ref 16–34)
ECHO LA VOLUME INDEX MOD A4C: 12 ML/M2 (ref 16–34)
ECHO LV E' LATERAL VELOCITY: 10 CM/S
ECHO LV E' SEPTAL VELOCITY: 5 CM/S
ECHO LV FRACTIONAL SHORTENING: 30 % (ref 28–44)
ECHO LV INTERNAL DIMENSION DIASTOLE INDEX: 2.13 CM/M2
ECHO LV INTERNAL DIMENSION DIASTOLIC: 3.7 CM (ref 4.2–5.9)
ECHO LV INTERNAL DIMENSION SYSTOLIC INDEX: 1.49 CM/M2
ECHO LV INTERNAL DIMENSION SYSTOLIC: 2.6 CM
ECHO LV ISOVOLUMETRIC RELAXATION TIME (IVRT): 56 MS
ECHO LV IVSD: 1 CM (ref 0.6–1)
ECHO LV MASS 2D: 104.6 G (ref 88–224)
ECHO LV MASS INDEX 2D: 60.1 G/M2 (ref 49–115)
ECHO LV POSTERIOR WALL DIASTOLIC: 0.9 CM (ref 0.6–1)
ECHO LV RELATIVE WALL THICKNESS RATIO: 0.49
ECHO LVOT PEAK GRADIENT: 5 MMHG
ECHO LVOT PEAK VELOCITY: 1.1 M/S
ECHO MV A VELOCITY: 0.47 M/S
ECHO MV E DECELERATION TIME (DT): 161 MS
ECHO MV E VELOCITY: 0.58 M/S
ECHO MV E/A RATIO: 1.23
ECHO MV E/E' LATERAL: 5.8
ECHO MV E/E' RATIO (AVERAGED): 8.7
ECHO MV E/E' SEPTAL: 11.6
ECHO MV REGURGITANT PEAK GRADIENT: 88 MMHG
ECHO MV REGURGITANT PEAK VELOCITY: 4.7 M/S
ECHO PV MAX VELOCITY: 0.7 M/S
ECHO PV PEAK GRADIENT: 2 MMHG
ECHO RV INTERNAL DIMENSION: 2.3 CM
ECHO RV TAPSE: 2.1 CM (ref 1.7–?)
ECHO TV E WAVE: 0.4 M/S
NUC STRESS EJECTION FRACTION: 60 %
STRESS BASELINE DIAS BP: 57 MMHG
STRESS BASELINE HR: 53 BPM
STRESS BASELINE SYS BP: 132 MMHG
STRESS STAGE 1 BP: NORMAL MMHG
STRESS STAGE 1 DURATION: 1 MIN:SEC
STRESS STAGE 1 HR: 69 BPM
STRESS STAGE 2 BP: NORMAL MMHG
STRESS STAGE 2 DURATION: 1 MIN:SEC
STRESS STAGE 2 HR: 81 BPM
STRESS STAGE 3 BP: NORMAL MMHG
STRESS STAGE 3 DURATION: 1 MIN:SEC
STRESS STAGE 3 HR: 71 BPM
STRESS STAGE RECOVERY 1 BP: NORMAL MMHG
STRESS STAGE RECOVERY 1 DURATION: 1 MIN:SEC
STRESS STAGE RECOVERY 1 HR: 83 BPM
STRESS STAGE RECOVERY 2 BP: NORMAL MMHG
STRESS STAGE RECOVERY 2 DURATION: 1 MIN:SEC
STRESS STAGE RECOVERY 2 HR: 91 BPM
STRESS STAGE RECOVERY 3 BP: NORMAL MMHG
STRESS STAGE RECOVERY 3 DURATION: 1 MIN:SEC
STRESS STAGE RECOVERY 3 HR: 88 BPM
STRESS STAGE RECOVERY 4 BP: NORMAL MMHG
STRESS STAGE RECOVERY 4 DURATION: 2 MIN:SEC
STRESS STAGE RECOVERY 4 HR: 83 BPM
STRESS TARGET HR: 148 BPM

## 2024-05-30 PROCEDURE — 93017 CV STRESS TEST TRACING ONLY: CPT

## 2024-05-30 PROCEDURE — 93306 TTE W/DOPPLER COMPLETE: CPT

## 2024-05-30 PROCEDURE — A9500 TC99M SESTAMIBI: HCPCS | Performed by: NUCLEAR MEDICINE

## 2024-05-30 PROCEDURE — 3430000000 HC RX DIAGNOSTIC RADIOPHARMACEUTICAL: Performed by: NUCLEAR MEDICINE

## 2024-05-30 PROCEDURE — 78452 HT MUSCLE IMAGE SPECT MULT: CPT

## 2024-05-30 PROCEDURE — 6360000002 HC RX W HCPCS: Performed by: NUCLEAR MEDICINE

## 2024-05-30 RX ORDER — TETRAKIS(2-METHOXYISOBUTYLISOCYANIDE)COPPER(I) TETRAFLUOROBORATE 1 MG/ML
10.6 INJECTION, POWDER, LYOPHILIZED, FOR SOLUTION INTRAVENOUS
Status: COMPLETED | OUTPATIENT
Start: 2024-05-30 | End: 2024-05-30

## 2024-05-30 RX ORDER — REGADENOSON 0.08 MG/ML
0.4 INJECTION, SOLUTION INTRAVENOUS
Status: COMPLETED | OUTPATIENT
Start: 2024-05-30 | End: 2024-05-30

## 2024-05-30 RX ORDER — TETRAKIS(2-METHOXYISOBUTYLISOCYANIDE)COPPER(I) TETRAFLUOROBORATE 1 MG/ML
33.6 INJECTION, POWDER, LYOPHILIZED, FOR SOLUTION INTRAVENOUS
Status: COMPLETED | OUTPATIENT
Start: 2024-05-30 | End: 2024-05-30

## 2024-05-30 RX ADMIN — Medication 33.6 MILLICURIE: at 10:12

## 2024-05-30 RX ADMIN — Medication 10.6 MILLICURIE: at 09:18

## 2024-05-30 RX ADMIN — REGADENOSON 0.4 MG: 0.08 INJECTION, SOLUTION INTRAVENOUS at 10:12

## 2024-06-05 ENCOUNTER — TELEPHONE (OUTPATIENT)
Dept: CARDIOLOGY CLINIC | Age: 74
End: 2024-06-05

## 2024-06-05 NOTE — TELEPHONE ENCOUNTER
Onto sure I can answer the question as I am not sure where he is looking exactly   But it was normal

## 2024-06-05 NOTE — TELEPHONE ENCOUNTER
----- Message from Bhanu Lombardi sent at 6/5/2024  9:23 AM EDT -----  Regarding: ECHO Results  Contact: [phone]  The following is from test results, all of the results text uses the word normal, why is there an Abnormal tag on it?    ECHO (TTE) COMPLETE (PRN CONTRAST/BUBBLE/STRAIN/3D)  Results  Abnormal

## 2024-08-19 ENCOUNTER — OFFICE VISIT (OUTPATIENT)
Dept: FAMILY MEDICINE CLINIC | Age: 74
End: 2024-08-19

## 2024-08-19 VITALS
HEIGHT: 67 IN | HEART RATE: 72 BPM | BODY MASS INDEX: 22.66 KG/M2 | SYSTOLIC BLOOD PRESSURE: 134 MMHG | RESPIRATION RATE: 16 BRPM | WEIGHT: 144.38 LBS | DIASTOLIC BLOOD PRESSURE: 78 MMHG

## 2024-08-19 DIAGNOSIS — M25.542 ARTHRALGIA OF BOTH HANDS: ICD-10-CM

## 2024-08-19 DIAGNOSIS — M25.541 ARTHRALGIA OF BOTH HANDS: ICD-10-CM

## 2024-08-19 DIAGNOSIS — E78.00 HYPERCHOLESTEREMIA: ICD-10-CM

## 2024-08-19 DIAGNOSIS — G47.31 COMPLEX SLEEP APNEA SYNDROME: ICD-10-CM

## 2024-08-19 DIAGNOSIS — N13.8 BPH WITH OBSTRUCTION/LOWER URINARY TRACT SYMPTOMS: ICD-10-CM

## 2024-08-19 DIAGNOSIS — I25.118 ATHEROSCLEROTIC HEART DISEASE OF NATIVE CORONARY ARTERY WITH OTHER FORMS OF ANGINA PECTORIS (HCC): ICD-10-CM

## 2024-08-19 DIAGNOSIS — I48.0 PAROXYSMAL ATRIAL FIBRILLATION (HCC): Primary | ICD-10-CM

## 2024-08-19 DIAGNOSIS — D68.69 SECONDARY HYPERCOAGULABLE STATE (HCC): ICD-10-CM

## 2024-08-19 DIAGNOSIS — N40.1 BPH WITH OBSTRUCTION/LOWER URINARY TRACT SYMPTOMS: ICD-10-CM

## 2024-08-19 PROCEDURE — 1036F TOBACCO NON-USER: CPT | Performed by: FAMILY MEDICINE

## 2024-08-19 PROCEDURE — 3017F COLORECTAL CA SCREEN DOC REV: CPT | Performed by: FAMILY MEDICINE

## 2024-08-19 PROCEDURE — 1123F ACP DISCUSS/DSCN MKR DOCD: CPT | Performed by: FAMILY MEDICINE

## 2024-08-19 PROCEDURE — G8420 CALC BMI NORM PARAMETERS: HCPCS | Performed by: FAMILY MEDICINE

## 2024-08-19 PROCEDURE — G8427 DOCREV CUR MEDS BY ELIG CLIN: HCPCS | Performed by: FAMILY MEDICINE

## 2024-08-19 PROCEDURE — 99213 OFFICE O/P EST LOW 20 MIN: CPT | Performed by: FAMILY MEDICINE

## 2024-08-19 RX ORDER — PREDNISONE 20 MG/1
TABLET ORAL
Qty: 12 TABLET | Refills: 0 | Status: SHIPPED | OUTPATIENT
Start: 2024-08-19

## 2024-08-19 SDOH — ECONOMIC STABILITY: INCOME INSECURITY: HOW HARD IS IT FOR YOU TO PAY FOR THE VERY BASICS LIKE FOOD, HOUSING, MEDICAL CARE, AND HEATING?: NOT HARD AT ALL

## 2024-08-19 SDOH — ECONOMIC STABILITY: FOOD INSECURITY: WITHIN THE PAST 12 MONTHS, YOU WORRIED THAT YOUR FOOD WOULD RUN OUT BEFORE YOU GOT MONEY TO BUY MORE.: NEVER TRUE

## 2024-08-19 SDOH — ECONOMIC STABILITY: FOOD INSECURITY: WITHIN THE PAST 12 MONTHS, THE FOOD YOU BOUGHT JUST DIDN'T LAST AND YOU DIDN'T HAVE MONEY TO GET MORE.: NEVER TRUE

## 2024-08-19 ASSESSMENT — ENCOUNTER SYMPTOMS
CONSTIPATION: 0
ABDOMINAL PAIN: 0
NAUSEA: 0
TROUBLE SWALLOWING: 0
COUGH: 0
CHEST TIGHTNESS: 0
EYE PAIN: 0
SORE THROAT: 0
SHORTNESS OF BREATH: 0
BLOOD IN STOOL: 0
BACK PAIN: 0

## 2024-08-19 NOTE — PROGRESS NOTES
Subjective   Patient ID: Bhanu Lombardi is a 73 y.o. male.         Ashd  stable       Bph  stable    stable       Gerd  stable       Bph   for  turp               Atrial Fibrillation  Presents for follow-up visit. Symptoms are negative for chest pain, dizziness, palpitations, shortness of breath and weakness. Past medical history includes hyperlipidemia.   Arthritis  Presents for follow-up visit. He reports no pain or joint swelling. Affected locations include the right wrist and left wrist. Pertinent negatives include no fatigue, fever or rash. Compliance with total regimen is %.   Hyperlipidemia  This is a chronic problem. The current episode started more than 1 year ago. The problem is controlled. Pertinent negatives include no chest pain, focal weakness, leg pain, myalgias or shortness of breath. Current antihyperlipidemic treatment includes statins. The current treatment provides significant improvement of lipids. There are no compliance problems.      Past Medical History:   Diagnosis Date    Actinic keratoses     Angina effort      Replacing deprecated diagnoses    Arthritis     hands    ASHD (arteriosclerotic heart disease)     Carotid disease, bilateral (HCC) 2017    Colon polyps 05/2007    Hypercholesteremia 07/1997    Narcolepsy     Rotator cuff (capsule) sprain     Tobacco abuse     Unspecified sleep apnea     cpap        Review of Systems   Constitutional:  Negative for fatigue and fever.   HENT:  Negative for congestion, ear pain, postnasal drip, sore throat and trouble swallowing.    Eyes:  Negative for pain.   Respiratory:  Negative for cough, chest tightness and shortness of breath.    Cardiovascular:  Negative for chest pain, palpitations and leg swelling.   Gastrointestinal:  Negative for abdominal pain, blood in stool, constipation and nausea.   Genitourinary:  Negative for difficulty urinating, frequency and urgency.   Musculoskeletal:  Positive for arthritis. Negative for

## 2024-08-27 ENCOUNTER — TELEPHONE (OUTPATIENT)
Dept: UROLOGY | Age: 74
End: 2024-08-27

## 2024-08-27 DIAGNOSIS — R97.20 ELEVATED PSA: Primary | ICD-10-CM

## 2024-08-27 DIAGNOSIS — N40.1 BPH WITH OBSTRUCTION/LOWER URINARY TRACT SYMPTOMS: ICD-10-CM

## 2024-08-27 DIAGNOSIS — Z01.818 PRE-OP TESTING: ICD-10-CM

## 2024-08-27 DIAGNOSIS — N13.8 BPH WITH OBSTRUCTION/LOWER URINARY TRACT SYMPTOMS: ICD-10-CM

## 2024-08-27 NOTE — TELEPHONE ENCOUNTER
Patient scheduled for a Cystoscopy, Greenlight Photo Vaporization of Prostate  with Dr. Lee on 10/1/2024. We are asking for clearance and direction on holding Eliquis and Aspirin.

## 2024-08-27 NOTE — TELEPHONE ENCOUNTER
SURGERY SCHEDULING FORM   [address] [address]      Phone *[phone] *[phone]   Surgical Scheduling Direct Line Phone *[phone] Fax *[phone]      Bhanu Lombardi 1950 male    [address] [facility] [address]   Marital Status:          Home Phone: [phone]      Cell Phone:    Telephone Information:   Mobile [phone]          Surgeon: Dr. Lee       Surgery Date: 10/1/2024       Time: 12:00PM    Procedure: Cystoscopy, Greenlight Photo Vaporization of Prostate    Diagnosis: BPH     Important Medical History:  In [facility]    Special Inst/Equip: CHARLEE Khan conf#[id number]    CPT Codes:    07610  Latex Allergy: No     Cardiac Device:  No    Anesthesia:  General          Admission Type:  Same Day                        Admit Prior to Day of Surgery: No    Case Location:  Main OR            Preadmission Testing:  Phone Call          PAT Date and Time:______________________________________________________    PAT Confirmation #: ______________________________________________________    Post Op Visit: ___________________________________________________________    Need Preop Cardiac Clearance: Yes    Does Patient have Cardiologist/physician?     Dr. Corbett    Surgery Confirmation #: __________________________________________________    : ________________________   Date: __________________________     Insurance Company Name: Medicare

## 2024-08-27 NOTE — TELEPHONE ENCOUNTER
DO NOT TAKE  FISH OIL, MOBIC, IBUPROFEN, MOTRIN-LIKE DRUGS AND ANY MULTIVITAMINS OR OVER THE COUNTER SUPPLEMENTS 14 DAYS PRIOR TO SURGERY.    HOLD ASPIRIN 5 DAYS PRIOR TO SURGERY  HOLD ELIQUIS 3 DAYS PRIOR TO SURGERY    MUST HAVE AN ADULT OVER THE AGE OF 18 WITH YOU AT THE TIME OF THE DISCHARGE         Bhanu WORKMAN Harjit 1950     Surgical Physician: Dr. Lee      You have been scheduled for the procedure marked below:      Surgery: Cystoscopy, Greenlight Photo Vaporization of Prostate         Date: 10/1/2024     Anesthesia:  General     Place of Service: [facility] --Second Floor Same Day Surgery         Arrive to same day surgery at:  10:00am  (Surgery time is subject to change)      INSTRUCTIONS AS MARKED BELOW:    1.  DO NOT eat or drink anything after midnight before surgery.  2.  We prefer you shower or bathe with an antibacterial soap (Dial) the morning of surgery.  3  Please bring a current medication list, photo ID and insurance card(s) with you  4. Okay to take Tylenol  5. Take blood pressure or heart medication as directed, if taken in the morning take with a small sip of water  6.The office will call you in 1-2 days after your procedure to schedule a follow up.    DATE SENSITIVE PRE OP TESTING:    TO AVOID YOUR SURGERY BEING CANCELLED DO ON THE DATE LISTED *WALK IN *NO APPOINTMENT.     DO URINE CULTURE AND FASTING LABS ON 9/17/2024         Date: 8/27/2024

## 2024-08-27 NOTE — TELEPHONE ENCOUNTER
Patient is scheduled for surgery with  on 10/1/2024. Surgery consent to be done on arrival. Dr. NORMAN to clear.  Patient to do pre op URINE CULTURE AND FASTING LABS ON 9/17/2024. Surgery instructions  mailed to the patient.     Patient informed an adult over the age of 18 must be with them at the time of surgery and upon discharge    FORTEK CONF#[id number] PER NORM

## 2024-08-30 ENCOUNTER — HOSPITAL ENCOUNTER (EMERGENCY)
Age: 74
Discharge: HOME | DRG: 682 | End: 2024-08-30
Payer: MEDICARE

## 2024-08-30 ENCOUNTER — HOSPITAL ENCOUNTER (INPATIENT)
Age: 74
LOS: 1 days | Discharge: HOME | DRG: 682 | End: 2024-09-01
Attending: EMERGENCY MEDICINE
Payer: MEDICARE

## 2024-08-30 ENCOUNTER — APPOINTMENT (OUTPATIENT)
Dept: CT IMAGING | Age: 74
DRG: 682 | End: 2024-08-30
Payer: MEDICARE

## 2024-08-30 VITALS
RESPIRATION RATE: 16 BRPM | OXYGEN SATURATION: 95 % | HEART RATE: 82 BPM | TEMPERATURE: 97.3 F | SYSTOLIC BLOOD PRESSURE: 132 MMHG | DIASTOLIC BLOOD PRESSURE: 67 MMHG

## 2024-08-30 DIAGNOSIS — N39.0 ACUTE UTI (URINARY TRACT INFECTION): Primary | ICD-10-CM

## 2024-08-30 DIAGNOSIS — R31.9 HEMATURIA, UNSPECIFIED TYPE: ICD-10-CM

## 2024-08-30 DIAGNOSIS — N20.0 KIDNEY STONE: ICD-10-CM

## 2024-08-30 DIAGNOSIS — N30.01 ACUTE CYSTITIS WITH HEMATURIA: ICD-10-CM

## 2024-08-30 DIAGNOSIS — R10.9 LEFT FLANK PAIN: Primary | ICD-10-CM

## 2024-08-30 LAB
ANION GAP SERPL CALC-SCNC: 13 MEQ/L (ref 8–16)
BACTERIA: ABNORMAL
BASOPHILS ABSOLUTE: 0 THOU/MM3 (ref 0–0.1)
BASOPHILS ABSOLUTE: 0.1 THOU/MM3 (ref 0–0.1)
BASOPHILS NFR BLD AUTO: 0.3 %
BASOPHILS NFR BLD AUTO: 0.8 %
BILIRUB UR QL STRIP: ABNORMAL
BUN BLD-MCNC: 21 MG/DL (ref 8–26)
BUN SERPL-MCNC: 23 MG/DL (ref 7–22)
CALCIUM SERPL-MCNC: 8.5 MG/DL (ref 8.5–10.5)
CASTS #/AREA URNS LPF: ABNORMAL /LPF
CASTS #/AREA URNS LPF: ABNORMAL /LPF
CHARACTER UR: ABNORMAL
CHARCOAL URNS QL MICRO: ABNORMAL
CHLORIDE BLD-SCNC: 106 MEQ/L (ref 98–109)
CHLORIDE SERPL-SCNC: 106 MEQ/L (ref 98–111)
CO2 BLD CALC-SCNC: 22 MEQ/L (ref 23–33)
CO2 SERPL-SCNC: 24 MEQ/L (ref 23–33)
COLOR UR: ABNORMAL
CREAT BLD-MCNC: 1.2 MG/DL (ref 0.5–1.2)
CREAT SERPL-MCNC: 1.5 MG/DL (ref 0.4–1.2)
CRYSTALS URNS QL MICRO: ABNORMAL
DEPRECATED RDW RBC AUTO: 44.3 FL (ref 35–45)
EOSINOPHIL NFR BLD AUTO: 0.5 %
EOSINOPHIL NFR BLD AUTO: 1.1 %
EOSINOPHILS ABSOLUTE: 0.1 THOU/MM3 (ref 0–0.4)
EOSINOPHILS ABSOLUTE: 0.1 THOU/MM3 (ref 0–0.4)
EPITHELIAL CELLS, UA: ABNORMAL /HPF
ERYTHROCYTE [DISTWIDTH] IN BLOOD BY AUTOMATED COUNT: 13 % (ref 11.5–14.5)
ERYTHROCYTE [DISTWIDTH] IN BLOOD BY AUTOMATED COUNT: 13.4 % (ref 11.5–14.5)
GFR SERPL CREATININE-BSD FRML MDRD: 49 ML/MIN/1.73M2
GFR SERPL CREATININE-BSD FRML MDRD: 64 ML/MIN/1.73M2
GLUCOSE BLD-MCNC: 101 MG/DL (ref 70–108)
GLUCOSE SERPL-MCNC: 106 MG/DL (ref 70–108)
GLUCOSE UR QL STRIP.AUTO: NEGATIVE MG/DL
HCT VFR BLD AUTO: 46.8 % (ref 42–52)
HCT VFR BLD AUTO: 47.4 % (ref 42–52)
HGB BLD-MCNC: 16.2 GM/DL (ref 14–18)
HGB BLD-MCNC: 17.3 GM/DL (ref 14–18)
HGB UR QL STRIP.AUTO: ABNORMAL
IMM GRANULOCYTES # BLD AUTO: 0.03 THOU/MM3 (ref 0–0.07)
IMM GRANULOCYTES # BLD AUTO: 0.03 THOU/MM3 (ref 0–0.07)
IMM GRANULOCYTES NFR BLD AUTO: 0.3 %
IMM GRANULOCYTES NFR BLD AUTO: 0.3 %
KETONES UR QL STRIP.AUTO: NEGATIVE
LEUKOCYTE ESTERASE UR QL STRIP.AUTO: ABNORMAL
LYMPHOCYTES ABSOLUTE: 1.1 THOU/MM3 (ref 1–4.8)
LYMPHOCYTES ABSOLUTE: 2.6 THOU/MM3 (ref 1–4.8)
LYMPHOCYTES NFR BLD AUTO: 10.8 %
LYMPHOCYTES NFR BLD AUTO: 28.7 %
MCH RBC QN AUTO: 32.9 PG (ref 26–33)
MCH RBC QN AUTO: 33.7 PG (ref 27–31)
MCHC RBC AUTO-ENTMCNC: 34.6 GM/DL (ref 32.2–35.5)
MCHC RBC AUTO-ENTMCNC: 36.5 GM/DL (ref 33–37)
MCV RBC AUTO: 92 FL (ref 80–94)
MCV RBC AUTO: 94.9 FL (ref 80–94)
MONOCYTES ABSOLUTE: 0.8 THOU/MM3 (ref 0.4–1.3)
MONOCYTES ABSOLUTE: 1 THOU/MM3 (ref 0.4–1.3)
MONOCYTES NFR BLD AUTO: 11.4 %
MONOCYTES NFR BLD AUTO: 7.8 %
NEUTROPHILS ABSOLUTE: 5.3 THOU/MM3 (ref 1.8–7.7)
NEUTROPHILS ABSOLUTE: 8 THOU/MM3 (ref 1.8–7.7)
NEUTROPHILS NFR BLD AUTO: 57.7 %
NEUTROPHILS NFR BLD AUTO: 80.3 %
NITRITE UR QL STRIP.AUTO: POSITIVE
NRBC BLD AUTO-RTO: 0 /100 WBC
NRBC BLD AUTO-RTO: 0 /100 WBC
OSMOLALITY SERPL CALC.SUM OF ELEC: 289.1 MOSMOL/KG (ref 275–300)
PH UR STRIP.AUTO: 5 [PH] (ref 5–9)
PLATELET # BLD AUTO: 226 THOU/MM3 (ref 130–400)
PLATELET # BLD AUTO: 268 THOU/MM3 (ref 130–400)
PMV BLD AUTO: 9.5 FL (ref 9.4–12.4)
PMV BLD AUTO: 9.6 FL (ref 7.4–10.4)
POTASSIUM BLD-SCNC: 3.4 MEQ/L (ref 3.5–4.9)
POTASSIUM SERPL-SCNC: 3.6 MEQ/L (ref 3.5–5.2)
PROT UR STRIP.AUTO-MCNC: 100 MG/DL
RBC # BLD AUTO: 4.93 MILL/MM3 (ref 4.7–6.1)
RBC # BLD AUTO: 5.14 MILL/MM3 (ref 4.7–6.1)
RBC #/AREA URNS HPF: > 200 /HPF
RENAL EPI CELLS #/AREA URNS HPF: ABNORMAL /[HPF]
SODIUM BLD-SCNC: 142 MEQ/L (ref 138–146)
SODIUM SERPL-SCNC: 143 MEQ/L (ref 135–145)
SP GR UR REFRACT.AUTO: > 1.03 (ref 1–1.03)
UROBILINOGEN UR QL STRIP.AUTO: 0.2 EU/DL (ref 0–1)
WBC # BLD AUTO: 10 THOU/MM3 (ref 4.8–10.8)
WBC # BLD AUTO: 9.1 THOU/MM3 (ref 4.8–10.8)
WBC #/AREA URNS HPF: ABNORMAL /HPF
YEAST LIKE FUNGI URNS QL MICRO: ABNORMAL

## 2024-08-30 PROCEDURE — 87086 URINE CULTURE/COLONY COUNT: CPT

## 2024-08-30 PROCEDURE — 81003 URINALYSIS AUTO W/O SCOPE: CPT

## 2024-08-30 PROCEDURE — 99285 EMERGENCY DEPT VISIT HI MDM: CPT

## 2024-08-30 PROCEDURE — 81001 URINALYSIS AUTO W/SCOPE: CPT

## 2024-08-30 PROCEDURE — 84300 ASSAY OF URINE SODIUM: CPT

## 2024-08-30 PROCEDURE — 82436 ASSAY OF URINE CHLORIDE: CPT

## 2024-08-30 PROCEDURE — 85025 COMPLETE CBC W/AUTO DIFF WBC: CPT

## 2024-08-30 PROCEDURE — 84520 ASSAY OF UREA NITROGEN: CPT

## 2024-08-30 PROCEDURE — 82947 ASSAY GLUCOSE BLOOD QUANT: CPT

## 2024-08-30 PROCEDURE — 96375 TX/PRO/DX INJ NEW DRUG ADDON: CPT

## 2024-08-30 PROCEDURE — 6360000004 HC RX CONTRAST MEDICATION: Performed by: EMERGENCY MEDICINE

## 2024-08-30 PROCEDURE — 6360000002 HC RX W HCPCS

## 2024-08-30 PROCEDURE — 83735 ASSAY OF MAGNESIUM: CPT

## 2024-08-30 PROCEDURE — 80048 BASIC METABOLIC PNL TOTAL CA: CPT

## 2024-08-30 PROCEDURE — 2580000003 HC RX 258: Performed by: EMERGENCY MEDICINE

## 2024-08-30 PROCEDURE — 84133 ASSAY OF URINE POTASSIUM: CPT

## 2024-08-30 PROCEDURE — 83935 ASSAY OF URINE OSMOLALITY: CPT

## 2024-08-30 PROCEDURE — 84540 ASSAY OF URINE/UREA-N: CPT

## 2024-08-30 PROCEDURE — 99213 OFFICE O/P EST LOW 20 MIN: CPT

## 2024-08-30 PROCEDURE — 80051 ELECTROLYTE PANEL: CPT

## 2024-08-30 PROCEDURE — 82570 ASSAY OF URINE CREATININE: CPT

## 2024-08-30 PROCEDURE — 36415 COLL VENOUS BLD VENIPUNCTURE: CPT

## 2024-08-30 PROCEDURE — 6360000002 HC RX W HCPCS: Performed by: EMERGENCY MEDICINE

## 2024-08-30 PROCEDURE — 99214 OFFICE O/P EST MOD 30 MIN: CPT | Performed by: NURSE PRACTITIONER

## 2024-08-30 PROCEDURE — 2580000003 HC RX 258

## 2024-08-30 PROCEDURE — 74177 CT ABD & PELVIS W/CONTRAST: CPT

## 2024-08-30 PROCEDURE — 82565 ASSAY OF CREATININE: CPT

## 2024-08-30 PROCEDURE — 96374 THER/PROPH/DIAG INJ IV PUSH: CPT

## 2024-08-30 RX ORDER — MORPHINE SULFATE 4 MG/ML
4 INJECTION, SOLUTION INTRAMUSCULAR; INTRAVENOUS
Status: COMPLETED | OUTPATIENT
Start: 2024-08-30 | End: 2024-08-30

## 2024-08-30 RX ORDER — SODIUM CHLORIDE, SODIUM LACTATE, POTASSIUM CHLORIDE, AND CALCIUM CHLORIDE .6; .31; .03; .02 G/100ML; G/100ML; G/100ML; G/100ML
250 INJECTION, SOLUTION INTRAVENOUS ONCE
Status: COMPLETED | OUTPATIENT
Start: 2024-08-31 | End: 2024-08-31

## 2024-08-30 RX ORDER — CIPROFLOXACIN 500 MG/1
500 TABLET, FILM COATED ORAL 2 TIMES DAILY
Qty: 14 TABLET | Refills: 0 | Status: SHIPPED | OUTPATIENT
Start: 2024-08-30 | End: 2024-09-06

## 2024-08-30 RX ORDER — 0.9 % SODIUM CHLORIDE 0.9 %
1000 INTRAVENOUS SOLUTION INTRAVENOUS ONCE
Status: COMPLETED | OUTPATIENT
Start: 2024-08-30 | End: 2024-08-30

## 2024-08-30 RX ORDER — ONDANSETRON 2 MG/ML
4 INJECTION INTRAMUSCULAR; INTRAVENOUS ONCE
Status: COMPLETED | OUTPATIENT
Start: 2024-08-30 | End: 2024-08-30

## 2024-08-30 RX ORDER — IOPAMIDOL 755 MG/ML
80 INJECTION, SOLUTION INTRAVASCULAR
Status: COMPLETED | OUTPATIENT
Start: 2024-08-30 | End: 2024-08-30

## 2024-08-30 RX ORDER — SODIUM CHLORIDE, SODIUM LACTATE, POTASSIUM CHLORIDE, CALCIUM CHLORIDE 600; 310; 30; 20 MG/100ML; MG/100ML; MG/100ML; MG/100ML
INJECTION, SOLUTION INTRAVENOUS CONTINUOUS
Status: ACTIVE | OUTPATIENT
Start: 2024-08-31 | End: 2024-08-31

## 2024-08-30 RX ADMIN — MORPHINE SULFATE 4 MG: 4 INJECTION, SOLUTION INTRAMUSCULAR; INTRAVENOUS at 20:56

## 2024-08-30 RX ADMIN — ONDANSETRON 4 MG: 2 INJECTION INTRAMUSCULAR; INTRAVENOUS at 20:56

## 2024-08-30 RX ADMIN — HYDROMORPHONE HYDROCHLORIDE 1 MG: 1 INJECTION, SOLUTION INTRAMUSCULAR; INTRAVENOUS; SUBCUTANEOUS at 21:37

## 2024-08-30 RX ADMIN — IOPAMIDOL 80 ML: 755 INJECTION, SOLUTION INTRAVENOUS at 22:19

## 2024-08-30 RX ADMIN — CEFTRIAXONE SODIUM 1000 MG: 1 INJECTION, POWDER, FOR SOLUTION INTRAMUSCULAR; INTRAVENOUS at 23:53

## 2024-08-30 RX ADMIN — SODIUM CHLORIDE 1000 ML: 9 INJECTION, SOLUTION INTRAVENOUS at 21:37

## 2024-08-30 ASSESSMENT — PAIN SCALES - GENERAL
PAINLEVEL_OUTOF10: 7
PAINLEVEL_OUTOF10: 6

## 2024-08-30 ASSESSMENT — PAIN - FUNCTIONAL ASSESSMENT
PAIN_FUNCTIONAL_ASSESSMENT: 0-10
PAIN_FUNCTIONAL_ASSESSMENT: 0-10

## 2024-08-30 ASSESSMENT — PAIN DESCRIPTION - LOCATION: LOCATION: FLANK

## 2024-08-30 ASSESSMENT — PAIN DESCRIPTION - ORIENTATION: ORIENTATION: LEFT

## 2024-08-30 NOTE — ED PROVIDER NOTES
[facility] URGENT CARE  UrgentCare Encounter      CHIEFCOMPLAINT       Chief Complaint   Patient presents with    Hematuria       Nurses Notes reviewed and I agree except as noted in the HPI.  HISTORY OF PRESENT ILLNESS   Bhanu Lombardi is a 74 y.o. male who presents to urgent care with complaints of hematuria.  He states that approximately 1 week ago he started on Nexium and prednisone.  He had 1 episode of dark red bloody urine.  He was advised to stop the Nexium.  His urine cleared up.  And then today symptoms returned.  He denies back pain.  He denies flank pain.  He denies abdominal pain.  He denies frequency, urgency, burning.  He states that his urethra feels slightly \"irritated\".    REVIEW OF SYSTEMS     Review of Systems   Genitourinary:  Positive for hematuria.       PAST MEDICAL HISTORY         Diagnosis Date    Actinic keratoses     Angina effort      Replacing deprecated diagnoses    Arthritis     hands    ASHD (arteriosclerotic heart disease)     Carotid disease, bilateral (HCC) 2017    Colon polyps 05/2007    Hypercholesteremia 07/1997    Narcolepsy     Rotator cuff (capsule) sprain     Tobacco abuse     Unspecified sleep apnea     cpap       SURGICAL HISTORY     Patient  has a past surgical history that includes Coronary angioplasty with stent (07/2005); Rotator cuff repair (Right, 2008); Appendectomy; Tonsillectomy; Colonoscopy (08/16/2024); Rotator cuff repair (Left, 2015); Hemorrhoid surgery (09/2016); Cataract removal (Bilateral, 2016); Coronary angioplasty with stent (11/2017); and Coronary angioplasty with stent (11/09/2022).    CURRENT MEDICATIONS       Discharge Medication List as of 8/30/2024  5:34 PM        CONTINUE these medications which have NOT CHANGED    Details   methylphenidate (RITALIN) 20 MG tablet Take 1 tablet by mouth 3 times daily for 90 days. Max Daily Amount: 60 mg, Disp-270 tablet, R-0Normal      predniSONE (DELTASONE) 20 MG tablet One  tab po  bid  for 4  5:34 PM        START taking these medications    Details   ciprofloxacin (CIPRO) 500 MG tablet Take 1 tablet by mouth 2 times daily for 7 days, Disp-14 tablet, R-0Normal           Discharge Medication List as of 8/30/2024  5:34 PM          Lorrie Felton, NEHA - CNP         Purnima, NEHA Baugh - CNP  08/30/24 1945

## 2024-08-30 NOTE — ED NOTES
To [facility] with complaints of hematuria that started a week ago and thought it was related to starting nexium. States he stopped nexium and the blood went away Sunday and has been gone all week until today. Dark red blood in urine per pt.      Bina Keen, JOHN  08/30/24 [id number]

## 2024-08-31 ENCOUNTER — TELEPHONE (OUTPATIENT)
Dept: UROLOGY | Age: 74
End: 2024-08-31

## 2024-08-31 PROBLEM — R10.9 LEFT FLANK PAIN: Status: ACTIVE | Noted: 2024-08-31

## 2024-08-31 PROBLEM — N17.9 AKI (ACUTE KIDNEY INJURY) (HCC): Status: ACTIVE | Noted: 2024-08-31

## 2024-08-31 LAB
ANION GAP SERPL CALC-SCNC: 9 MEQ/L (ref 8–16)
APTT PPP: 28.6 SECONDS (ref 22–38)
APTT PPP: 28.8 SECONDS (ref 22–38)
BACTERIA URNS QL MICRO: ABNORMAL /HPF
BASOPHILS ABSOLUTE: 0 THOU/MM3 (ref 0–0.1)
BASOPHILS NFR BLD AUTO: 0.5 %
BILIRUB UR QL STRIP.AUTO: NEGATIVE
BUN SERPL-MCNC: 22 MG/DL (ref 7–22)
CA-I BLD ISE-SCNC: 1.12 MMOL/L (ref 1.12–1.32)
CALCIUM SERPL-MCNC: 8 MG/DL (ref 8.5–10.5)
CASTS #/AREA URNS LPF: ABNORMAL /LPF
CASTS 2: ABNORMAL /LPF
CHARACTER UR: ABNORMAL
CHLORIDE 24H UR-SRATE: 95 MEQ/L
CHLORIDE SERPL-SCNC: 106 MEQ/L (ref 98–111)
CO2 SERPL-SCNC: 24 MEQ/L (ref 23–33)
COLOR, UA: YELLOW
CREAT SERPL-MCNC: 1.3 MG/DL (ref 0.4–1.2)
CREAT UR-MCNC: 66 MG/DL
CRYSTALS URNS MICRO: ABNORMAL
DEPRECATED RDW RBC AUTO: 46.2 FL (ref 35–45)
DEPRECATED RDW RBC AUTO: 46.5 FL (ref 35–45)
EOSINOPHIL NFR BLD AUTO: 0.3 %
EOSINOPHILS ABSOLUTE: 0 THOU/MM3 (ref 0–0.4)
EPITHELIAL CELLS, UA: ABNORMAL /HPF
ERYTHROCYTE [DISTWIDTH] IN BLOOD BY AUTOMATED COUNT: 12.9 % (ref 11.5–14.5)
ERYTHROCYTE [DISTWIDTH] IN BLOOD BY AUTOMATED COUNT: 13 % (ref 11.5–14.5)
GFR SERPL CREATININE-BSD FRML MDRD: 58 ML/MIN/1.73M2
GLUCOSE SERPL-MCNC: 100 MG/DL (ref 70–108)
GLUCOSE UR QL STRIP.AUTO: NEGATIVE MG/DL
HCT VFR BLD AUTO: 42.4 % (ref 42–52)
HCT VFR BLD AUTO: 43.7 % (ref 42–52)
HEPARIN UNFRACTIONATED: 0.73 U/ML (ref 0.3–0.7)
HGB BLD-MCNC: 14.2 GM/DL (ref 14–18)
HGB BLD-MCNC: 15 GM/DL (ref 14–18)
HGB UR QL STRIP.AUTO: ABNORMAL
IMM GRANULOCYTES # BLD AUTO: 0.02 THOU/MM3 (ref 0–0.07)
IMM GRANULOCYTES NFR BLD AUTO: 0.3 %
INR PPP: 1.18 (ref 0.85–1.13)
KETONES UR QL STRIP.AUTO: ABNORMAL
LYMPHOCYTES ABSOLUTE: 0.5 THOU/MM3 (ref 1–4.8)
LYMPHOCYTES NFR BLD AUTO: 7.3 %
MAGNESIUM SERPL-MCNC: 2.3 MG/DL (ref 1.6–2.4)
MCH RBC QN AUTO: 32.3 PG (ref 26–33)
MCH RBC QN AUTO: 33.2 PG (ref 26–33)
MCHC RBC AUTO-ENTMCNC: 33.5 GM/DL (ref 32.2–35.5)
MCHC RBC AUTO-ENTMCNC: 34.3 GM/DL (ref 32.2–35.5)
MCV RBC AUTO: 96.6 FL (ref 80–94)
MCV RBC AUTO: 96.7 FL (ref 80–94)
MISCELLANEOUS 2: ABNORMAL
MONOCYTES ABSOLUTE: 0.7 THOU/MM3 (ref 0.4–1.3)
MONOCYTES NFR BLD AUTO: 9.6 %
NEUTROPHILS ABSOLUTE: 6.2 THOU/MM3 (ref 1.8–7.7)
NEUTROPHILS NFR BLD AUTO: 82 %
NITRITE UR QL STRIP: NEGATIVE
NRBC BLD AUTO-RTO: 0 /100 WBC
OSMOLALITY SERPL CALC.SUM OF ELEC: 281 MOSMOL/KG (ref 275–300)
OSMOLALITY UR: NORMAL MOSMOL/KG (ref 250–750)
PH UR STRIP.AUTO: 5 [PH] (ref 5–9)
PLATELET # BLD AUTO: 187 THOU/MM3 (ref 130–400)
PLATELET # BLD AUTO: 198 THOU/MM3 (ref 130–400)
PMV BLD AUTO: 9 FL (ref 9.4–12.4)
PMV BLD AUTO: 9.4 FL (ref 9.4–12.4)
POTASSIUM SERPL-SCNC: 4.5 MEQ/L (ref 3.5–5.2)
POTASSIUM UR-SCNC: 14.3 MEQ/L
PROT UR STRIP.AUTO-MCNC: ABNORMAL MG/DL
RBC # BLD AUTO: 4.39 MILL/MM3 (ref 4.7–6.1)
RBC # BLD AUTO: 4.52 MILL/MM3 (ref 4.7–6.1)
RBC URINE: > 200 /HPF
RENAL EPI CELLS #/AREA URNS HPF: ABNORMAL /[HPF]
SODIUM SERPL-SCNC: 139 MEQ/L (ref 135–145)
SODIUM UR-SCNC: 113 MEQ/L
SP GR UR REFRACT.AUTO: > 1.03 (ref 1–1.03)
UROBILINOGEN, URINE: 1 EU/DL (ref 0–1)
UUN 24H UR-MCNC: 471 MG/DL
WBC # BLD AUTO: 12 THOU/MM3 (ref 4.8–10.8)
WBC # BLD AUTO: 7.5 THOU/MM3 (ref 4.8–10.8)
WBC #/AREA URNS HPF: ABNORMAL /HPF
WBC #/AREA URNS HPF: ABNORMAL /[HPF]
YEAST LIKE FUNGI URNS QL MICRO: ABNORMAL

## 2024-08-31 PROCEDURE — 6370000000 HC RX 637 (ALT 250 FOR IP)

## 2024-08-31 PROCEDURE — 6360000002 HC RX W HCPCS

## 2024-08-31 PROCEDURE — 1200000003 HC TELEMETRY R&B

## 2024-08-31 PROCEDURE — 85610 PROTHROMBIN TIME: CPT

## 2024-08-31 PROCEDURE — 80048 BASIC METABOLIC PNL TOTAL CA: CPT

## 2024-08-31 PROCEDURE — 85520 HEPARIN ASSAY: CPT

## 2024-08-31 PROCEDURE — 94761 N-INVAS EAR/PLS OXIMETRY MLT: CPT

## 2024-08-31 PROCEDURE — 85025 COMPLETE CBC W/AUTO DIFF WBC: CPT

## 2024-08-31 PROCEDURE — 2580000003 HC RX 258

## 2024-08-31 PROCEDURE — 82330 ASSAY OF CALCIUM: CPT

## 2024-08-31 PROCEDURE — 85730 THROMBOPLASTIN TIME PARTIAL: CPT

## 2024-08-31 PROCEDURE — 36415 COLL VENOUS BLD VENIPUNCTURE: CPT

## 2024-08-31 PROCEDURE — 94669 MECHANICAL CHEST WALL OSCILL: CPT

## 2024-08-31 PROCEDURE — 99221 1ST HOSP IP/OBS SF/LOW 40: CPT | Performed by: UROLOGY

## 2024-08-31 PROCEDURE — 99223 1ST HOSP IP/OBS HIGH 75: CPT | Performed by: INTERNAL MEDICINE

## 2024-08-31 PROCEDURE — 85027 COMPLETE CBC AUTOMATED: CPT

## 2024-08-31 RX ORDER — SODIUM CHLORIDE 9 MG/ML
INJECTION, SOLUTION INTRAVENOUS PRN
Status: DISCONTINUED | OUTPATIENT
Start: 2024-08-31 | End: 2024-09-01 | Stop reason: HOSPADM

## 2024-08-31 RX ORDER — MORPHINE SULFATE 4 MG/ML
4 INJECTION, SOLUTION INTRAMUSCULAR; INTRAVENOUS
Status: DISCONTINUED | OUTPATIENT
Start: 2024-08-31 | End: 2024-09-01 | Stop reason: HOSPADM

## 2024-08-31 RX ORDER — POTASSIUM CHLORIDE 1500 MG/1
40 TABLET, EXTENDED RELEASE ORAL PRN
Status: DISCONTINUED | OUTPATIENT
Start: 2024-08-31 | End: 2024-09-01 | Stop reason: HOSPADM

## 2024-08-31 RX ORDER — ACETAMINOPHEN 325 MG/1
650 TABLET ORAL EVERY 6 HOURS PRN
Status: DISCONTINUED | OUTPATIENT
Start: 2024-08-31 | End: 2024-09-01 | Stop reason: HOSPADM

## 2024-08-31 RX ORDER — ONDANSETRON 4 MG/1
4 TABLET, ORALLY DISINTEGRATING ORAL EVERY 8 HOURS PRN
Status: DISCONTINUED | OUTPATIENT
Start: 2024-08-31 | End: 2024-09-01 | Stop reason: HOSPADM

## 2024-08-31 RX ORDER — POLYETHYLENE GLYCOL 3350 17 G/17G
17 POWDER, FOR SOLUTION ORAL DAILY
Status: DISCONTINUED | OUTPATIENT
Start: 2024-08-31 | End: 2024-09-01 | Stop reason: HOSPADM

## 2024-08-31 RX ORDER — CALCIUM CARBONATE 500 MG/1
500 TABLET, CHEWABLE ORAL 3 TIMES DAILY PRN
Status: DISCONTINUED | OUTPATIENT
Start: 2024-08-31 | End: 2024-09-01 | Stop reason: HOSPADM

## 2024-08-31 RX ORDER — METHYLPHENIDATE HYDROCHLORIDE 10 MG/1
20 TABLET ORAL 3 TIMES DAILY
Status: DISCONTINUED | OUTPATIENT
Start: 2024-08-31 | End: 2024-09-01 | Stop reason: HOSPADM

## 2024-08-31 RX ORDER — HEPARIN SODIUM 1000 [USP'U]/ML
60 INJECTION, SOLUTION INTRAVENOUS; SUBCUTANEOUS ONCE
Status: DISCONTINUED | OUTPATIENT
Start: 2024-08-31 | End: 2024-08-31

## 2024-08-31 RX ORDER — POTASSIUM CHLORIDE 7.45 MG/ML
10 INJECTION INTRAVENOUS PRN
Status: DISCONTINUED | OUTPATIENT
Start: 2024-08-31 | End: 2024-09-01 | Stop reason: HOSPADM

## 2024-08-31 RX ORDER — HEPARIN SODIUM 10000 [USP'U]/100ML
5-30 INJECTION, SOLUTION INTRAVENOUS CONTINUOUS
Status: DISCONTINUED | OUTPATIENT
Start: 2024-08-31 | End: 2024-08-31

## 2024-08-31 RX ORDER — MAGNESIUM SULFATE IN WATER 40 MG/ML
2000 INJECTION, SOLUTION INTRAVENOUS PRN
Status: DISCONTINUED | OUTPATIENT
Start: 2024-08-31 | End: 2024-09-01 | Stop reason: HOSPADM

## 2024-08-31 RX ORDER — SODIUM CHLORIDE 0.9 % (FLUSH) 0.9 %
5-40 SYRINGE (ML) INJECTION EVERY 12 HOURS SCHEDULED
Status: DISCONTINUED | OUTPATIENT
Start: 2024-08-31 | End: 2024-09-01 | Stop reason: HOSPADM

## 2024-08-31 RX ORDER — HEPARIN SODIUM 1000 [USP'U]/ML
30 INJECTION, SOLUTION INTRAVENOUS; SUBCUTANEOUS PRN
Status: DISCONTINUED | OUTPATIENT
Start: 2024-08-31 | End: 2024-08-31

## 2024-08-31 RX ORDER — ATORVASTATIN CALCIUM 40 MG/1
40 TABLET, FILM COATED ORAL DAILY
Status: DISCONTINUED | OUTPATIENT
Start: 2024-08-31 | End: 2024-09-01 | Stop reason: HOSPADM

## 2024-08-31 RX ORDER — HEPARIN SODIUM 1000 [USP'U]/ML
60 INJECTION, SOLUTION INTRAVENOUS; SUBCUTANEOUS PRN
Status: DISCONTINUED | OUTPATIENT
Start: 2024-08-31 | End: 2024-08-31

## 2024-08-31 RX ORDER — MORPHINE SULFATE 2 MG/ML
2 INJECTION, SOLUTION INTRAMUSCULAR; INTRAVENOUS
Status: DISCONTINUED | OUTPATIENT
Start: 2024-08-31 | End: 2024-09-01 | Stop reason: HOSPADM

## 2024-08-31 RX ORDER — ACETAMINOPHEN 650 MG/1
650 SUPPOSITORY RECTAL EVERY 6 HOURS PRN
Status: DISCONTINUED | OUTPATIENT
Start: 2024-08-31 | End: 2024-09-01 | Stop reason: HOSPADM

## 2024-08-31 RX ORDER — SODIUM CHLORIDE 0.9 % (FLUSH) 0.9 %
5-40 SYRINGE (ML) INJECTION PRN
Status: DISCONTINUED | OUTPATIENT
Start: 2024-08-31 | End: 2024-09-01 | Stop reason: HOSPADM

## 2024-08-31 RX ORDER — ONDANSETRON 2 MG/ML
4 INJECTION INTRAMUSCULAR; INTRAVENOUS EVERY 6 HOURS PRN
Status: DISCONTINUED | OUTPATIENT
Start: 2024-08-31 | End: 2024-09-01 | Stop reason: HOSPADM

## 2024-08-31 RX ORDER — CALCIUM GLUCONATE 20 MG/ML
2000 INJECTION, SOLUTION INTRAVENOUS PRN
Status: DISCONTINUED | OUTPATIENT
Start: 2024-08-31 | End: 2024-09-01 | Stop reason: HOSPADM

## 2024-08-31 RX ORDER — LANOLIN ALCOHOL/MO/W.PET/CERES
3 CREAM (GRAM) TOPICAL NIGHTLY PRN
Status: DISCONTINUED | OUTPATIENT
Start: 2024-08-31 | End: 2024-09-01 | Stop reason: HOSPADM

## 2024-08-31 RX ADMIN — METHYLPHENIDATE HYDROCHLORIDE 20 MG: 10 TABLET ORAL at 15:03

## 2024-08-31 RX ADMIN — SODIUM CHLORIDE, POTASSIUM CHLORIDE, SODIUM LACTATE AND CALCIUM CHLORIDE: 600; 310; 30; 20 INJECTION, SOLUTION INTRAVENOUS at 09:08

## 2024-08-31 RX ADMIN — APIXABAN 5 MG: 5 TABLET, FILM COATED ORAL at 12:18

## 2024-08-31 RX ADMIN — SODIUM CHLORIDE, POTASSIUM CHLORIDE, SODIUM LACTATE AND CALCIUM CHLORIDE 250 ML: 600; 310; 30; 20 INJECTION, SOLUTION INTRAVENOUS at 00:25

## 2024-08-31 RX ADMIN — HEPARIN SODIUM 12 UNITS/KG/HR: 10000 INJECTION, SOLUTION INTRAVENOUS at 00:55

## 2024-08-31 RX ADMIN — METHYLPHENIDATE HYDROCHLORIDE 20 MG: 10 TABLET ORAL at 09:07

## 2024-08-31 RX ADMIN — POTASSIUM BICARBONATE 40 MEQ: 782 TABLET, EFFERVESCENT ORAL at 00:20

## 2024-08-31 RX ADMIN — CEFTRIAXONE SODIUM 1000 MG: 1 INJECTION, POWDER, FOR SOLUTION INTRAMUSCULAR; INTRAVENOUS at 19:48

## 2024-08-31 RX ADMIN — ATORVASTATIN CALCIUM 40 MG: 40 TABLET, FILM COATED ORAL at 19:55

## 2024-08-31 RX ADMIN — SODIUM CHLORIDE: 9 INJECTION, SOLUTION INTRAVENOUS at 19:48

## 2024-08-31 ASSESSMENT — PAIN SCALES - GENERAL
PAINLEVEL_OUTOF10: 0

## 2024-08-31 NOTE — ED NOTES
This RN in to round. RR regular and unlabored. Call light in reach. Fluids infusing per mar. Visitors at bedside.

## 2024-08-31 NOTE — ED NOTES
Pt noted to be hypoxic when this RN in to round. Pt reports CSA hx. Placed on 2LPM NC. Upon initial verbal stimulus, pt alert and oriented X4. Visitors at bedside. Call light in reach.

## 2024-08-31 NOTE — ED PROVIDER NOTES
Transfer of Care Note:   Physician Signing out: Dr. Okeefe  Receiving Physician: Deep Aragon MD  Sign out time: 2200      Brief history:  74-year-old male with past medical history high cholesterol, ASD, arthritis presents to the ED complaining of left flank pain and hematuria.    Items pending that need to be checked:  CT abdomen pelvis      Tentative Impression of patient:  74-year-old male who presents with left flank pain and hematuria.    Expected disposition of patient:  Pending results, admitted.        Additional Assessment and results:   I have personally performed a face to face diagnostic evaluation on this patient. The patient's initial evaluation and plan have been discussed with the prior physician who initially evaluated the patient. Nursing Notes, Past Medical Hx, Past Surgical Hx, Social Hx, Allergies, vital signs and Family Hx were all reviewed.      Vitals:    08/30/24 2243   BP: 139/79   Pulse: 76   Resp: 25   Temp:    SpO2: 98%     Physical Exam      Labs Reviewed   CBC WITH AUTO DIFFERENTIAL - Abnormal; Notable for the following components:       Result Value    MCV 94.9 (*)     All other components within normal limits   BASIC METABOLIC PANEL - Abnormal; Notable for the following components:    BUN 23 (*)     Creatinine 1.5 (*)     All other components within normal limits   GLOMERULAR FILTRATION RATE, ESTIMATED - Abnormal; Notable for the following components:    Est, Glom Filt Rate 49 (*)     All other components within normal limits   ANION GAP   OSMOLALITY   URINALYSIS WITH REFLEX TO CULTURE         Medications   cefTRIAXone (ROCEPHIN) 1,000 mg in sodium chloride 0.9 % 50 mL IVPB (mini-bag) (has no administration in time range)   morphine (PF) injection 4 mg (4 mg IntraVENous Given 8/30/24 2056)   ondansetron (ZOFRAN) injection 4 mg (4 mg IntraVENous Given 8/30/24 2056)   iopamidol (ISOVUE-370) 76 % injection 80 mL (80 mLs IntraVENous Given 8/30/24 2219)   sodium chloride 0.9 % 
PATIENT NAME: Bhanu Lombardi  MRN: [id number]  : 1950  TELLO: 2024    I performed a history and physical examination of the patient and discussed management with the Resident. I reviewed the Resident's note and agree with the documented findings and plan of care. Any areas of disagreement are noted on the chart. I was personally present for the key portions of any procedures and have documented in the chart those procedures where I was not present during the key portions. I have reviewed the emergency nurses triage note and agree with the chief complaint, past medical history, past surgical history, allergies, medications, social and family history as documented unless otherwise noted below.    MEDICAL DEDISION MAKING (MDM)     Bhanu Lombardi is a 74 y.o. male who presents to Emergency Department with Flank Pain     Dark urine x 1 last week after he started Nexium. Nexium was discontinued per recommendation from PCP. He went to urgent care for evaluation today. Urine was too dark for dipstick. CBC and POC BMP were reassuring at urgent care. Empirically patient was prescribed Cipro. Patient came to ED because of left flank pain which started 1 hour ago.    Exam: AVSS. Nontoxic appearing. Heart: RRR, S1 and S2. Lungs CTAB. Soft abdomen w/o tenderness.  Left CVA mild tenderness.  Neurologically intact. No skin rashes.     Labs show mild DIAMOND (Cr 1.5 with baseline Cr 0.9), otherwise unremarkable.    ED management includes NS bolus, IV Zofran, IV morphine and Dilaudid.    Pending CT AP with IV contrast and UA.     Signed out to the night shift physician and resident.     Vitals:    24 2132   BP: 132/79 (!) 143/82 (!) 147/80 (!) 158/84   Pulse: 55 56 55 61   Resp: 18 18 14 20   Temp: 97.5 °F (36.4 °C)      TempSrc: Oral      SpO2: 100% 99% 98% 99%   Weight: 63.5 kg (140 lb)      Height: 1.702 m (5' 7\")        Labs Reviewed   CBC WITH AUTO DIFFERENTIAL - 
(L lateral abdomen). There is no right CVA tenderness, left CVA tenderness or guarding.   Musculoskeletal:         General: Normal range of motion.      Cervical back: Normal range of motion and neck supple. No tenderness.   Lymphadenopathy:      Cervical: No cervical adenopathy.   Skin:     General: Skin is warm and dry.      Capillary Refill: Capillary refill takes less than 2 seconds.   Neurological:      General: No focal deficit present.      Mental Status: He is alert and oriented to person, place, and time.   Psychiatric:         Mood and Affect: Mood normal.         FORMAL DIAGNOSTIC RESULTS     RADIOLOGY: Interpretation per the Radiologist below, if available at the time of this note (none if blank):    CT ABDOMEN PELVIS W IV CONTRAST Additional Contrast? None    (Results Pending)       LABS: (none if blank)  Labs Reviewed   CBC WITH AUTO DIFFERENTIAL - Abnormal; Notable for the following components:       Result Value    MCV 94.9 (*)     All other components within normal limits   BASIC METABOLIC PANEL   URINALYSIS WITH REFLEX TO CULTURE       (Any cultures that may have been sent were not resulted at the time of this patient visit)    MEDICAL DECISION MAKING / ED COURSE:     1) Number and Complexity of Problems            Problem List This Visit:         Chief Complaint   Patient presents with    Flank Pain            Differential Diagnosis includes (but not limited to):  Nephro/ureterolithiasis, AAA, diverticulitis, UTI, hydronephrosis, pyelonephritis            2)  Treatment and Disposition         ED Reassessment:  See ED course         Shared Decision-Making was performed and disposition discussed with the        Patient/Family and questions answered          Social determinants of health impacting treatment or disposition:  N/A         Code Status:  Full      Summary of Patient Presentation:      Medical Decision Making  This is an uncomfortable but non toxic appearing 73 y/o male with a h/o PAF on

## 2024-08-31 NOTE — PLAN OF CARE
Problem: Pain  Goal: Verbalizes/displays adequate comfort level or baseline comfort level  Outcome: Progressing     Problem: Discharge Planning  Goal: Discharge to home or other facility with appropriate resources  Outcome: Progressing     Problem: Chronic Conditions and Co-morbidities  Goal: Patient's chronic conditions and co-morbidity symptoms are monitored and maintained or improved  Outcome: Progressing     Problem: Skin/Tissue Integrity  Goal: Absence of new skin breakdown  Description: 1.  Monitor for areas of redness and/or skin breakdown  2.  Assess vascular access sites hourly  3.  Every 4-6 hours minimum:  Change oxygen saturation probe site  4.  Every 4-6 hours:  If on nasal continuous positive airway pressure, respiratory therapy assess nares and determine need for appliance change or resting period.  Outcome: Progressing     Problem: Genitourinary - Adult  Goal: Absence of urinary retention  Outcome: Progressing     Problem: Metabolic/Fluid and Electrolytes - Adult  Goal: Electrolytes maintained within normal limits  Outcome: Progressing     Problem: Metabolic/Fluid and Electrolytes - Adult  Goal: Hemodynamic stability and optimal renal function maintained  Outcome: Progressing     Problem: Hematologic - Adult  Goal: Maintains hematologic stability  Outcome: Progressing   Care plan reviewed with patient .  Patient verbalize understanding of the plan of care and contribute to goal setting.

## 2024-08-31 NOTE — ED NOTES
Coags and CBC drawn. Heparin infusion started per mar. Visitors at bedside. Pt remains alert and oriented. Call light in reach.

## 2024-08-31 NOTE — ED TRIAGE NOTES
Pt arrives to ED for c/o left flank pain. Per pt, he was evaluated at urgent care around 1700 for hematuria but did not have any pain at that time. Pt reports pain started suddenly approximately 45 minutes ago. Pt denies history of kidney stones. Pt denies nausea and vomiting. Pt denies taking medications for pain pta.

## 2024-08-31 NOTE — ED NOTES
ED to inpatient nurses report      Chief Complaint:  Chief Complaint   Patient presents with    Flank Pain     Present to ED from: home    MOA:     LOC: alert and orientated to name, place, date  Mobility: Independent  Oxygen Baseline: RA    Current needs required: RA     Code Status:   Full Code    What abnormal results were found and what did you give/do to treat them? Cystitis   Any procedures or intervention occur? analgesics    Mental Status:  Level of Consciousness: Alert (0)    Psych Assessment:        Vitals:  Patient Vitals for the past 24 hrs:   BP Temp Temp src Pulse Resp SpO2 Height Weight   08/31/24 0056 (!) 145/92 -- -- 56 14 100 % -- --   08/31/24 0026 (!) 143/82 -- -- 82 16 99 % -- --   08/30/24 2243 139/79 -- -- 76 25 98 % -- --   08/30/24 2240 -- -- -- 89 14 (!) 84 % -- --   08/30/24 2207 (!) 157/87 -- -- 57 18 99 % -- --   08/30/24 2132 (!) 158/84 -- -- 61 20 99 % -- --   08/30/24 2055 (!) 147/80 -- -- 55 14 98 % -- --   08/30/24 2037 (!) 143/82 -- -- 56 18 99 % -- --   08/30/24 2021 132/79 97.5 °F (36.4 °C) Oral 55 18 100 % 1.702 m (5' 7\") 63.5 kg (140 lb)        LDAs:   Peripheral IV 08/30/24 Distal;Right;Anterior Cephalic (Active)       Peripheral IV 08/31/24 Right;Anterior Forearm (Active)   Site Assessment Clean, dry & intact 08/31/24 0058   Line Status Normal saline locked 08/31/24 0058   Line Care Connections checked and tightened 08/31/24 0058   Phlebitis Assessment No symptoms 08/31/24 0058   Infiltration Assessment 0 08/31/24 0058   Dressing Status Clean, dry & intact 08/31/24 0058   Dressing Type Transparent 08/31/24 0058       Ambulatory Status:  No data recorded    Diagnosis:  DISPOSITION Admitted 08/31/2024 12:23:45 AM   Final diagnoses:   Left flank pain   Acute cystitis with hematuria   Kidney stone        Consults:  IP CONSULT TO UROLOGY     Pain Score:  Pain Assessment  Pain Assessment: 0-10  Pain Level: 6  Pain Location: Flank  Pain Orientation: Left    C-SSRS:   Risk of

## 2024-08-31 NOTE — TELEPHONE ENCOUNTER
Scheduled for greenlight in sept with Rosa  Has bladder stones  Please add on cystolithoplaxy with greenlight  Discussed with Dr Lee

## 2024-08-31 NOTE — CONSULTS
[facility] [place]  [facility] ONC MED 5K  [address] [address]  Dept: [phone]  Loc: [phone]  Visit Date: 8/30/2024    Urology Consult Note    Reason for Consult:  Bladder calculi, multiple largest 9 mm   Requesting Physician:  medicine    History Obtained From:  patient, electronic medical record    Chief Complaint: flank pain    HISTORY OF PRESENT ILLNESS:                The patient is a 74 y.o. male with significant past medical history of PMHx of BPH following with urology, complex sleep apnea on CPAP, paroxysmal atrial fibrillation, arthritis, ADHD, HLD, CAD who presents to [facility] for evaluation of left sided flank pain that started approximately 1 hour prior to patient's arrival to the ED   Found to have bladder stone, likely recently passed ureteral stone  Scheduled for greenlight 10/1/24    Past Medical History:        Diagnosis Date    Actinic keratoses     Angina effort      Replacing deprecated diagnoses    Arthritis     hands    ASHD (arteriosclerotic heart disease)     Carotid disease, bilateral (HCC) 2017    Colon polyps 05/2007    Hypercholesteremia 07/1997    Narcolepsy     Rotator cuff (capsule) sprain     Tobacco abuse     Unspecified sleep apnea     cpap     Past Surgical History:        Procedure Laterality Date    APPENDECTOMY      CATARACT REMOVAL Bilateral 2016    Right- Oct, Left- Dec- DR ROBISON     COLONOSCOPY  08/16/2024       colon polyps    CORONARY ANGIOPLASTY WITH STENT PLACEMENT  07/2005    CORONARY ANGIOPLASTY WITH STENT PLACEMENT  11/2017    2 nd stent to LAD    CORONARY ANGIOPLASTY WITH STENT PLACEMENT  11/09/2022    Dr Corbett/Dr Pearce @ [facility]    HEMORRHOID SURGERY  09/2016    Dr Macario, every 2 weeks x 3 times     ROTATOR CUFF REPAIR Right 2008    Dr. Jacobs    ROTATOR CUFF REPAIR Left 2015    Dr Jacobs @ [facility]   rt 2015    TONSILLECTOMY       Allergies:  Adderall [amphetamine-dextroamphetamine], Flomax [tamsulosin],

## 2024-08-31 NOTE — PROGRESS NOTES
Patient educated on how to use incentive spirometer. Patient verbalized understanding and demonstrated proper use. Emphasized importance and usage of device, with coughing and deep breathing every 4 hours while awake.

## 2024-08-31 NOTE — PLAN OF CARE
Problem: Pain  Goal: Verbalizes/displays adequate comfort level or baseline comfort level  8/31/2024 1039 by Akua Palomino RN  Outcome: Progressing  Flowsheets (Taken 8/31/2024 1039)  Verbalizes/displays adequate comfort level or baseline comfort level:   Encourage patient to monitor pain and request assistance   Assess pain using appropriate pain scale   Administer analgesics based on type and severity of pain and evaluate response   Implement non-pharmacological measures as appropriate and evaluate response     Problem: Discharge Planning  Goal: Discharge to home or other facility with appropriate resources  8/31/2024 1039 by Akua Palomino, RN  Outcome: Progressing  Flowsheets (Taken 8/31/2024 1039)  Discharge to home or other facility with appropriate resources:   Identify barriers to discharge with patient and caregiver   Arrange for needed discharge resources and transportation as appropriate   Identify discharge learning needs (meds, wound care, etc)     Problem: Chronic Conditions and Co-morbidities  Goal: Patient's chronic conditions and co-morbidity symptoms are monitored and maintained or improved  8/31/2024 1039 by Akua Palomino RN  Outcome: Progressing  Flowsheets (Taken 8/31/2024 1039)  Care Plan - Patient's Chronic Conditions and Co-Morbidity Symptoms are Monitored and Maintained or Improved:   Monitor and assess patient's chronic conditions and comorbid symptoms for stability, deterioration, or improvement   Collaborate with multidisciplinary team to address chronic and comorbid conditions and prevent exacerbation or deterioration   Update acute care plan with appropriate goals if chronic or comorbid symptoms are exacerbated and prevent overall improvement and discharge     Problem: Skin/Tissue Integrity  Goal: Absence of new skin breakdown  Description: 1.  Monitor for areas of redness and/or skin breakdown  2.  Assess vascular access sites hourly  3.  Every 4-6 hours minimum:  Change oxygen

## 2024-08-31 NOTE — ED NOTES
Medicated per mar. Pt tripoding in bed due to pain. Reports immediate relief with analgesic administration. Wife at bedside.

## 2024-08-31 NOTE — H&P
History & Physical  Internal Medicine Resident     Patient: Bhanu Lombardi 74 y.o. male      : 1950  Date of Admission: 2024  Date of Service: Pt seen/examined on 24 and Admitted to Inpatient with expected LOS greater than two midnights due to medical therapy.     ASSESSMENT AND PLAN  DIAMOND: POA. Creatinine presentation 1.5. FeNA: 1.8% - Indeterminate. Possibly postobstructive with patient having bladder calculi.  CT imaging does show likely recent passing of calculi with left hydroureter and left mild hydronephrosis.  - Continue monitor renal function, daily BMP  - On IV fluids, monitor urine output  - Consider renal ultrasound  - Avoid nephrotoxic agents as tolerated  Complicated Urinary Tract Infection: POA. Initial UA obtained on 2024 shows moderate bacteria. Patient does have known BPH. Baseline patient has increased frequency. Patient does report increase in urination and sensitivity with urination. Patient received Rocephin 1 g in ED. WBC did increase from 9.1-12.0.  - Continue Rocephin 1 g  - Follow urinary culture, adjust antibiotics per sensitivities  - On IV fluids  - Strict I's and O's  Bladder calculi W/hematuria: POA.  Presented with left flank pain, radiating to the groin.  CT abdomen pelvis showed multiple stones in the urinary bladder measuring 3 mm and 2 mm.  Largest stone seen to be 9 mm.  As of note imaging shows a 3 mm stone in the urinary bladder just beyond the left ureterovesical junction likely recently passed from left kidney as there is left hydroureter and mild left hydronephrosis.  Patient notes intermittent hematuria over the last several days.  Denies any blood clots.  Tachycardia, dizziness or low blood pressure seen.  No urinary retention  -Initially anticoagulation was continued as patient was not having augustina hematuria, and patient was started on heparin gtt.  Heparin GTT was then discontinued in the setting of decreased and hemoglobin.  - Continue to

## 2024-08-31 NOTE — ED NOTES
Medicated per mar. Pt rates current pain 6/10. He remains alert and oriented. Wife at bedside. Call light in reach.

## 2024-09-01 VITALS
TEMPERATURE: 98.7 F | HEIGHT: 67 IN | HEART RATE: 52 BPM | WEIGHT: 140 LBS | BODY MASS INDEX: 21.97 KG/M2 | SYSTOLIC BLOOD PRESSURE: 118 MMHG | OXYGEN SATURATION: 97 % | DIASTOLIC BLOOD PRESSURE: 65 MMHG | RESPIRATION RATE: 18 BRPM

## 2024-09-01 LAB
ANION GAP SERPL CALC-SCNC: 4 MEQ/L (ref 8–16)
BACTERIA UR CULT: ABNORMAL
BACTERIA UR CULT: ABNORMAL
BASOPHILS ABSOLUTE: 0.1 THOU/MM3 (ref 0–0.1)
BASOPHILS NFR BLD AUTO: 0.9 %
BUN SERPL-MCNC: 17 MG/DL (ref 7–22)
CALCIUM SERPL-MCNC: 8.3 MG/DL (ref 8.5–10.5)
CHLORIDE SERPL-SCNC: 108 MEQ/L (ref 98–111)
CO2 SERPL-SCNC: 31 MEQ/L (ref 23–33)
CREAT SERPL-MCNC: 0.9 MG/DL (ref 0.4–1.2)
DEPRECATED RDW RBC AUTO: 46.8 FL (ref 35–45)
EOSINOPHIL NFR BLD AUTO: 2.6 %
EOSINOPHILS ABSOLUTE: 0.2 THOU/MM3 (ref 0–0.4)
ERYTHROCYTE [DISTWIDTH] IN BLOOD BY AUTOMATED COUNT: 13.2 % (ref 11.5–14.5)
GFR SERPL CREATININE-BSD FRML MDRD: 90 ML/MIN/1.73M2
GLUCOSE SERPL-MCNC: 92 MG/DL (ref 70–108)
HCT VFR BLD AUTO: 45 % (ref 42–52)
HGB BLD-MCNC: 15.1 GM/DL (ref 14–18)
IMM GRANULOCYTES # BLD AUTO: 0.03 THOU/MM3 (ref 0–0.07)
IMM GRANULOCYTES NFR BLD AUTO: 0.5 %
LYMPHOCYTES ABSOLUTE: 1.1 THOU/MM3 (ref 1–4.8)
LYMPHOCYTES NFR BLD AUTO: 17.5 %
MCH RBC QN AUTO: 32.9 PG (ref 26–33)
MCHC RBC AUTO-ENTMCNC: 33.6 GM/DL (ref 32.2–35.5)
MCV RBC AUTO: 98 FL (ref 80–94)
MONOCYTES ABSOLUTE: 0.6 THOU/MM3 (ref 0.4–1.3)
MONOCYTES NFR BLD AUTO: 9.5 %
NEUTROPHILS ABSOLUTE: 4.5 THOU/MM3 (ref 1.8–7.7)
NEUTROPHILS NFR BLD AUTO: 69 %
NRBC BLD AUTO-RTO: 0 /100 WBC
ORGANISM: ABNORMAL
ORGANISM: ABNORMAL
ORIGINAL SAMPLE NUMBER: NORMAL
PLATELET # BLD AUTO: 178 THOU/MM3 (ref 130–400)
PMV BLD AUTO: 9.3 FL (ref 9.4–12.4)
POTASSIUM SERPL-SCNC: 5 MEQ/L (ref 3.5–5.2)
RBC # BLD AUTO: 4.59 MILL/MM3 (ref 4.7–6.1)
REFERENCE LOCATION: NORMAL
REFERENCE RANGE: NORMAL
SODIUM SERPL-SCNC: 143 MEQ/L (ref 135–145)
TEST RESULTS WITH UNITS: NORMAL
TEST(S) BEING PERFORMED: NORMAL
WBC # BLD AUTO: 6.5 THOU/MM3 (ref 4.8–10.8)

## 2024-09-01 PROCEDURE — 6370000000 HC RX 637 (ALT 250 FOR IP)

## 2024-09-01 PROCEDURE — 99239 HOSP IP/OBS DSCHRG MGMT >30: CPT | Performed by: INTERNAL MEDICINE

## 2024-09-01 PROCEDURE — 6370000000 HC RX 637 (ALT 250 FOR IP): Performed by: INTERNAL MEDICINE

## 2024-09-01 PROCEDURE — 36415 COLL VENOUS BLD VENIPUNCTURE: CPT

## 2024-09-01 PROCEDURE — 80048 BASIC METABOLIC PNL TOTAL CA: CPT

## 2024-09-01 PROCEDURE — 85025 COMPLETE CBC W/AUTO DIFF WBC: CPT

## 2024-09-01 PROCEDURE — 2580000003 HC RX 258

## 2024-09-01 RX ADMIN — SODIUM CHLORIDE, PRESERVATIVE FREE 10 ML: 5 INJECTION INTRAVENOUS at 08:50

## 2024-09-01 RX ADMIN — METHYLPHENIDATE HYDROCHLORIDE 20 MG: 10 TABLET ORAL at 08:50

## 2024-09-01 RX ADMIN — APIXABAN 5 MG: 5 TABLET, FILM COATED ORAL at 08:49

## 2024-09-01 ASSESSMENT — PAIN SCALES - GENERAL
PAINLEVEL_OUTOF10: 0
PAINLEVEL_OUTOF10: 0

## 2024-09-01 NOTE — PROGRESS NOTES
Discharge education and instructions provided. Patient instructed to follow up with PCP in one week as this RN was unable to schedule due to the weekend/holiday. Patient verbalized understanding at this time. No questions asked. IV access removed. All personal belongings, AVS and new medications present with patient. Patient refused transport to [facility] via wheelchair. Patient ambulated to [facility] with his wife. Chart contents placed in yellow bin.

## 2024-09-01 NOTE — DISCHARGE SUMMARY
Hospital Medicine Discharge Summary      Patient Identification:   Bhanu Lombardi   : 1950  MRN: [id number]   Account: [id number]      Patient's PCP: Chao Rodrigues MD    Admit Date: 2024     Discharge Date:   24    Admitting Physician: No admitting provider for patient encounter.     Discharge Physician: Miky Coppola MD     Discharge Diagnoses: Nephrolithiasis; DIAMOND; BPH     Active Hospital Problems    Diagnosis Date Noted    DIAMOND (acute kidney injury) (HCC) [N17.9] 2024    Left flank pain [R10.9] 2024       The patient was seen and examined on day of discharge and this discharge summary is in conjunction with any daily progress note from day of discharge.    Hospital Course:   Bhanu Lombardi is a 74 y.o. male admitted to [facility] on 2024 for flank pain.  PMHx of BPH following with urology, complex sleep apnea on CPAP, paroxysmal atrial fibrillation, arthritis, ADHD, HLD, CAD. Left flank pain, patient then went to urgent care earlier today, 2024 for evaluation of bloody urine. CT abdomen pelvis was obtained in ED which showed multiple bladder calculi. Since patient was in the ED his acute left flank pain as resolved. Urine culture no growth. Admitted for IVF hydration and pain control. Urology consulted. Found to have bladder stone, likely recently passed ureteral stone  Scheduled for greenlight 10/1/24. Urology recommending ok for discharge with close outpatient follow-up. DIAMOND resolved. Tolerating PO intake, no flank pain. No fevers or chills.       Exam:     Vitals:  Vitals:    24 1506 24 1945 24 0500 24 0844   BP: (!) 153/68 124/62 132/71 118/65   Pulse: 72 68 69 52   Resp: 16 17 18 18   Temp: 98 °F (36.7 °C) 98.9 °F (37.2 °C) 98.1 °F (36.7 °C) 98.7 °F (37.1 °C)   TempSrc: Oral Oral Oral Oral   SpO2: 100% 98% 98% 97%   Weight:       Height:         Weight: Weight - Scale: 63.5 kg (140 lb)     24 hour

## 2024-09-03 ENCOUNTER — TELEPHONE (OUTPATIENT)
Dept: FAMILY MEDICINE CLINIC | Age: 74
End: 2024-09-03

## 2024-09-03 NOTE — TELEPHONE ENCOUNTER
Pt called, passed 5 kidney stones 8-30-24, largest one was a 9 mm. very painful. Lower back feels tight at times. Is it alright for him to get on his riding ? When can he also swing a golf club?    Please advise.

## 2024-09-04 ENCOUNTER — CARE COORDINATION (OUTPATIENT)
Dept: FAMILY MEDICINE CLINIC | Age: 74
End: 2024-09-04

## 2024-09-04 NOTE — CARE COORDINATION
Care Transitions Initial Follow Up Call    Call within 2 business days of discharge: Yes     Patient: Bhanu Lombardi Patient : 1950 MRN: [id number]    Last Discharge Facility       Date Complaint Diagnosis Description Type Department Provider    24 Flank Pain Left flank pain ... ED to Hosp-Admission (Discharged) (ADMITTED) [facility] Miky Carrington MD; Tremaine, Surinder,...    24 Hematuria Acute UTI (urinary tract infection) ... [facility]             RARS: Readmission Risk Score: 8.7       Spoke with: Darin    Discharge department/facility: [facility]    Non-face-to-face services provided:  Scheduled appointment with PCP-yes  Scheduled appointment with Specialist-yes  Obtained and reviewed discharge summary and/or continuity of care documents  Reviewed and followed up on pending diagnostic tests and treatments-yes  Communication with home health agencies or other community services the patient is currently using-na  Communication with specialists who will assume or re-assume care of the patient's system-specific problems-yes  Education of patient/family/caregiver/guardian to support self-management-yes  Assessment and support for treatment adherence and medication management-yes  Establishment or re-establishment of referrals-yes  Assistance in accessing community resources-na    Follow Up  Future Appointments   Date Time Provider Department Center   2024  9:00 AM Chao Rodrigues MD [facility]   2025 10:00 AM Chao Rodrigues MD [facility] [email or web address]   2025 10:30 AM Padmaja Arias PA-C [facility]   2025 11:00 AM Mercedes Jay MD [facility]   Spoke to Darin for follow up post hospital discharge for DIAMOND-nephrolithiasis. Passed several bladder stones. Creat was 1.5, with hydration down to 0.9. Had call in to Dr. Galindo regarding activity-told as tolerated, drink plenty of water. Scheduled for cystolithoplaxy with

## 2024-09-06 ENCOUNTER — OFFICE VISIT (OUTPATIENT)
Dept: FAMILY MEDICINE CLINIC | Age: 74
End: 2024-09-06

## 2024-09-06 VITALS
RESPIRATION RATE: 18 BRPM | HEART RATE: 60 BPM | WEIGHT: 142 LBS | DIASTOLIC BLOOD PRESSURE: 68 MMHG | BODY MASS INDEX: 22.29 KG/M2 | SYSTOLIC BLOOD PRESSURE: 120 MMHG | HEIGHT: 67 IN

## 2024-09-06 DIAGNOSIS — I25.10 ARTERIOSCLEROTIC HEART DISEASE (ASHD): ICD-10-CM

## 2024-09-06 DIAGNOSIS — E78.00 HYPERCHOLESTEREMIA: ICD-10-CM

## 2024-09-06 DIAGNOSIS — N13.8 BPH WITH OBSTRUCTION/LOWER URINARY TRACT SYMPTOMS: ICD-10-CM

## 2024-09-06 DIAGNOSIS — D68.69 SECONDARY HYPERCOAGULABLE STATE (HCC): ICD-10-CM

## 2024-09-06 DIAGNOSIS — N20.0 KIDNEY STONE ON LEFT SIDE: Primary | ICD-10-CM

## 2024-09-06 DIAGNOSIS — Z09 HOSPITAL DISCHARGE FOLLOW-UP: ICD-10-CM

## 2024-09-06 DIAGNOSIS — I48.0 PAROXYSMAL ATRIAL FIBRILLATION (HCC): ICD-10-CM

## 2024-09-06 DIAGNOSIS — N40.1 BPH WITH OBSTRUCTION/LOWER URINARY TRACT SYMPTOMS: ICD-10-CM

## 2024-09-06 DIAGNOSIS — Z95.820 STATUS POST ANGIOPLASTY WITH STENT: ICD-10-CM

## 2024-09-06 ASSESSMENT — ENCOUNTER SYMPTOMS
CHEST TIGHTNESS: 0
NAUSEA: 0
EYE PAIN: 0
COUGH: 0
ABDOMINAL PAIN: 0
BLOOD IN STOOL: 0
SHORTNESS OF BREATH: 0
TROUBLE SWALLOWING: 0
CONSTIPATION: 0
SORE THROAT: 0
BACK PAIN: 0

## 2024-09-06 NOTE — PROGRESS NOTES
Subjective   Patient ID: Bhanu Lombardi is a 74 y.o. male.    HPI    Review of Systems       Objective   Physical Exam       Assessment   ***      Plan   ***        Chao Rodrigues MD  
chewable tablet Take 1 tablet by mouth daily      ciprofloxacin (CIPRO) 500 MG tablet Take 1 tablet by mouth 2 times daily for 7 days (Patient not taking: Reported on 9/6/2024) 14 tablet 0     No current facility-administered medications for this visit.        Patient having no further symptoms and stable.  As stated having the stress test in May he is cleared for upcoming procedure which is October 1.  He should stop the Eliquis on September 27 which is for paroxysmal atrial fibrillation do not feel he needs any bridging with Lovenox.  He is cleared for surgery at this time.    Subjective:   HPI    Inpatient course: Discharge summary reviewed- see chart.    Interval history/Current status: Recently admitted on August 30 discharged then on September 1 secondary to left sided kidney stone and discomfort and pain.  Had significant bladder spasm.  Noted to have bladder stones with an enlarged prostate.  Does not appear to have any further stones.  Pain is much improved at this time.  At this point is scheduled for greenlight procedure because of the enlarged prostate and also cystoscopy at that time to have the bladder stones removed      Now  no  abdominal pain  or  flank pain  or  dysuria  or  blood        Bph  stable  and   for  greenlight   for  10-1-24      Stone  moved  and    with  eliquis  with  blood  and   noted    due to  stone    urine  cx  form  8-30-24  was  negative       Ashd  stable actually already had stress test and echocardiogram which was completely normal done in late May 2024.  So basically is cleared for surgery at this time from a cardiac standpoint    Patient Active Problem List   Diagnosis    Atherosclerotic heart disease of native coronary artery with other forms of angina pectoris (HCC)    Hypercholesteremia    Hearing loss    Arthritis    Colon polyps    Narcolepsy    Rotator cuff (capsule) sprain    Complex sleep apnea syndrome    Central sleep apnea due to Cheyne-Santos respiration

## 2024-09-10 ENCOUNTER — PREP FOR PROCEDURE (OUTPATIENT)
Dept: UROLOGY | Age: 74
End: 2024-09-10

## 2024-09-11 ENCOUNTER — CARE COORDINATION (OUTPATIENT)
Dept: FAMILY MEDICINE CLINIC | Age: 74
End: 2024-09-11

## 2024-09-17 ENCOUNTER — PREP FOR PROCEDURE (OUTPATIENT)
Dept: UROLOGY | Age: 74
End: 2024-09-17

## 2024-09-17 LAB
ANION GAP SERPL CALCULATED.3IONS-SCNC: 9 MEQ/L (ref 7–16)
BASOPHILS ABSOLUTE: 0.04 K/UL (ref 0–0.2)
BASOPHILS RELATIVE PERCENT: 1.1 %
BUN BLDV-MCNC: 18 MG/DL (ref 8–23)
CALCIUM SERPL-MCNC: 9.2 MG/DL (ref 8.5–10.5)
CHLORIDE BLD-SCNC: 106 MEQ/L (ref 95–107)
CO2: 26 MEQ/L (ref 19–31)
CREAT SERPL-MCNC: 1.1 MG/DL (ref 0.8–1.4)
EGFR IF NONAFRICAN AMERICAN: 71 ML/MIN/1.73
EOSINOPHILS ABSOLUTE: 0.2 K/UL (ref 0–0.5)
EOSINOPHILS RELATIVE PERCENT: 5.5 %
GLUCOSE: 107 MG/DL (ref 70–99)
HCT VFR BLD CALC: 50 % (ref 40–51)
HEMOGLOBIN: 16.8 G/DL (ref 13.5–17)
IMMATURE GRANS (ABS): 0
IMMATURE GRANULOCYTES %: 0 %
LYMPHOCYTES ABSOLUTE: 0.79 K/UL (ref 1–4)
LYMPHOCYTES RELATIVE PERCENT: 21.6 %
MCH RBC QN AUTO: 31.9 PG (ref 25–33)
MCHC RBC AUTO-ENTMCNC: 33.6 G/DL (ref 31–36)
MCV RBC AUTO: 94.9 FL (ref 80–99)
MONOCYTES ABSOLUTE: 0.38 K/UL (ref 0.2–1)
MONOCYTES RELATIVE PERCENT: 10.4 %
NEUTROPHILS ABSOLUTE: 2.25 K/UL (ref 1.5–7.5)
NEUTROPHILS RELATIVE PERCENT: 61.4 %
PDW BLD-RTO: 11.9 % (ref 11.5–15)
PLATELET # BLD: 247 K/UL (ref 130–400)
PMV BLD AUTO: 9.5 FL (ref 9.3–13)
POTASSIUM SERPL-SCNC: 4.3 MEQ/L (ref 3.5–5.4)
RBC # BLD: 5.27 M/UL (ref 4.5–6.1)
SODIUM BLD-SCNC: 141 MEQ/L (ref 133–146)
WBC # BLD: 3.7 K/UL (ref 3.5–11)

## 2024-09-18 ENCOUNTER — CARE COORDINATION (OUTPATIENT)
Dept: FAMILY MEDICINE CLINIC | Age: 74
End: 2024-09-18

## 2024-09-19 RX ORDER — SODIUM CHLORIDE 9 MG/ML
INJECTION, SOLUTION INTRAVENOUS PRN
Status: CANCELLED | OUTPATIENT
Start: 2024-09-19

## 2024-09-19 RX ORDER — SODIUM CHLORIDE 0.9 % (FLUSH) 0.9 %
5-40 SYRINGE (ML) INJECTION PRN
Status: CANCELLED | OUTPATIENT
Start: 2024-09-19

## 2024-09-19 RX ORDER — SODIUM CHLORIDE 0.9 % (FLUSH) 0.9 %
5-40 SYRINGE (ML) INJECTION EVERY 12 HOURS SCHEDULED
Status: CANCELLED | OUTPATIENT
Start: 2024-09-19

## 2024-09-20 LAB — URINE CULTURE, ROUTINE: NORMAL

## 2024-09-23 NOTE — PROGRESS NOTES
Patient returned call. PAT call completed. Hx and home medications reviewed. Patient instructed to stop ASA 5 days before surgery (9/26/2024), stop eliquis 3 days before surgery (9/28/2024), and states he was told to stop all OTC meds and supplements by office. Patient verbalized understanding. No concerns voiced.

## 2024-09-23 NOTE — PROGRESS NOTES
PAT call attempted, patient unavailable, left message to please call us back at your earliest convenience; [phone]

## 2024-09-23 NOTE — PROGRESS NOTES
Follow all instructions given by your physician  Do not eat or drink anything after midnight prior to surgery(includes water, chewing gum, mints and ice chips)  Sips of water am of surgery with allowed medications  May brush teeth   Drink alcoholic beverages or use any illicit drugs for 24 hours prior to surgery  Bring insurance info and photo ID  Bring pertinent paperwork with you from Doctor or surgeons's office  Wear clean comfortable, loose-fitting clothing  No make-up, nail polish, jewelry, piercings  Shower the night before and the morning of surgery with cleansing soap provided or a liquid antibacterial soap, dry with new fresh clean towel after each shower, no lotions, creams or powder.  Clean sheets and pillowcase on bed night before surgery  Bring medications in original bottles, Bring rescue inhalers with you  Bring CPAP/BIPAP machine if you have one ( you may be charged if one is needed in recovery room )    Do you have a DNR? NO DNR but has ACP docs    Our pharmacy has a Meds to Beds program where they will deliver any new prescriptions you may have to your room before you leave. Our Pharmacy will clear it through your insurance; for example (same co pay). This enables you to take your new RX as soon as you need when you get home and avoids stop/wait delays on the way home.  Please have a form of payment with you and have someone designated as your Pharmacy contact with their phone # as you may not feel well or still be under the influence of anesthesia.    Please refer to the SSI-Surgical Site Infection Flyer you hopefully received in the mail-together we can prevent infections; signs and symptoms reviewed.  When discharged be sure you understand how to care for your wound and that you have the Dr./office phone # to call if you have any concerns or questions about your wound.     needed at discharge and someone over 18 to stay with you for 24 hours overnight (surgery may be cancelled if you don't

## 2024-09-23 NOTE — PROGRESS NOTES
PAT Call Date:  9/23   Surgery Date: 10/1    Surgeon: Rosa   Surgery: cysto, greenlight,cystolithoplaxy    Any Isolation Precautions? No      Type of Isolation Precaution: Not Applicable   Is patient from a nursing home? No  Name of Nursing Home:   Any equipment assist needed for moving patient? No   Type of Equipment: Not Applicable  Patient last weight: 144 lb     Hard Copy on Chart  In EPIC Pending/Notes   Consent -   Within 30 days; signed, dated & timed by patient and physician     [] On Arrival     [] Blood      [] DNR   H&P -   Within 30 days    [] Physician To Do     Clearance -    9/4 8/27  [x]Medical Tremoulis-cleared     [x]Cardiac Baki asa5d, eliquis 2d     [] Pulmonary    Orders -   Signed and Dated      [] Physician To Do   INR   Labs -   Within 3 months   9/17 9/1 9/17  [x] CBC -ok   [x] BMP-ok   [x] GFR-ok   [] INR    [] PTT    [x] Urine -ok   [] Liver Enzymes    [] MRSA Nasal      Others:     Radiology Studies -   Within 1 year  N/a    8/30  [] Chest X-Ray   [] MRI    [x] CT abd/pelvis   [] Vascular   [] US     Pulmonary -     [x] JAY   [x] CPAP     Cardiac Workup -   Stress Test, Echo, Cath within 18 months  5/8 11/9/22 5/30    [x] EKG -ok     [x] Cath                 [x] Stress Test                      [x] Echo/TAMRA 55-60%   [] CABG   [] Holter Monitor      [] Pacemaker/ICD        Brand:        Where does patient have checked:         Last check:         Rep Notified:

## 2024-09-25 ENCOUNTER — CARE COORDINATION (OUTPATIENT)
Dept: FAMILY MEDICINE CLINIC | Age: 74
End: 2024-09-25

## 2024-10-01 ENCOUNTER — HOSPITAL ENCOUNTER (OUTPATIENT)
Age: 74
Setting detail: OUTPATIENT SURGERY
Discharge: HOME | End: 2024-10-01
Attending: UROLOGY | Admitting: UROLOGY
Payer: MEDICARE

## 2024-10-01 ENCOUNTER — ANESTHESIA EVENT (OUTPATIENT)
Dept: OPERATING ROOM | Age: 74
End: 2024-10-01
Payer: MEDICARE

## 2024-10-01 ENCOUNTER — ANESTHESIA (OUTPATIENT)
Dept: OPERATING ROOM | Age: 74
End: 2024-10-01
Payer: MEDICARE

## 2024-10-01 VITALS
DIASTOLIC BLOOD PRESSURE: 68 MMHG | HEART RATE: 68 BPM | SYSTOLIC BLOOD PRESSURE: 146 MMHG | TEMPERATURE: 96.8 F | OXYGEN SATURATION: 97 % | RESPIRATION RATE: 16 BRPM | HEIGHT: 67 IN | WEIGHT: 138 LBS | BODY MASS INDEX: 21.66 KG/M2

## 2024-10-01 DIAGNOSIS — G89.18 POST-OP PAIN: Primary | ICD-10-CM

## 2024-10-01 LAB — INR PPP: 0.99 (ref 0.85–1.13)

## 2024-10-01 PROCEDURE — 2720000010 HC SURG SUPPLY STERILE: Performed by: UROLOGY

## 2024-10-01 PROCEDURE — 7100000000 HC PACU RECOVERY - FIRST 15 MIN: Performed by: UROLOGY

## 2024-10-01 PROCEDURE — 36415 COLL VENOUS BLD VENIPUNCTURE: CPT

## 2024-10-01 PROCEDURE — 3600000013 HC SURGERY LEVEL 3 ADDTL 15MIN: Performed by: UROLOGY

## 2024-10-01 PROCEDURE — 3700000001 HC ADD 15 MINUTES (ANESTHESIA): Performed by: UROLOGY

## 2024-10-01 PROCEDURE — C1769 GUIDE WIRE: HCPCS | Performed by: UROLOGY

## 2024-10-01 PROCEDURE — C1758 CATHETER, URETERAL: HCPCS | Performed by: UROLOGY

## 2024-10-01 PROCEDURE — 6360000002 HC RX W HCPCS: Performed by: NURSE ANESTHETIST, CERTIFIED REGISTERED

## 2024-10-01 PROCEDURE — 2580000003 HC RX 258: Performed by: UROLOGY

## 2024-10-01 PROCEDURE — 85610 PROTHROMBIN TIME: CPT

## 2024-10-01 PROCEDURE — 3600000003 HC SURGERY LEVEL 3 BASE: Performed by: UROLOGY

## 2024-10-01 PROCEDURE — 6360000002 HC RX W HCPCS: Performed by: UROLOGY

## 2024-10-01 PROCEDURE — 7100000010 HC PHASE II RECOVERY - FIRST 15 MIN: Performed by: UROLOGY

## 2024-10-01 PROCEDURE — 7100000001 HC PACU RECOVERY - ADDTL 15 MIN: Performed by: UROLOGY

## 2024-10-01 PROCEDURE — C2617 STENT, NON-COR, TEM W/O DEL: HCPCS | Performed by: UROLOGY

## 2024-10-01 PROCEDURE — 3700000000 HC ANESTHESIA ATTENDED CARE: Performed by: UROLOGY

## 2024-10-01 PROCEDURE — 2709999900 HC NON-CHARGEABLE SUPPLY: Performed by: UROLOGY

## 2024-10-01 PROCEDURE — 7100000011 HC PHASE II RECOVERY - ADDTL 15 MIN: Performed by: UROLOGY

## 2024-10-01 DEVICE — URETERAL STENT
Type: IMPLANTABLE DEVICE | Site: URETER | Status: FUNCTIONAL
Brand: PERCUFLEX™ PLUS

## 2024-10-01 RX ORDER — HYDROCODONE BITARTRATE AND ACETAMINOPHEN 5; 325 MG/1; MG/1
1 TABLET ORAL EVERY 6 HOURS PRN
Qty: 12 TABLET | Refills: 0 | Status: SHIPPED | OUTPATIENT
Start: 2024-10-01 | End: 2024-10-04

## 2024-10-01 RX ORDER — PHENYLEPHRINE HCL IN 0.9% NACL 1 MG/10 ML
SYRINGE (ML) INTRAVENOUS
Status: DISCONTINUED | OUTPATIENT
Start: 2024-10-01 | End: 2024-10-01 | Stop reason: SDUPTHER

## 2024-10-01 RX ORDER — OXYBUTYNIN CHLORIDE 5 MG/1
5 TABLET ORAL 3 TIMES DAILY PRN
Qty: 30 TABLET | Refills: 0 | Status: SHIPPED | OUTPATIENT
Start: 2024-10-01

## 2024-10-01 RX ORDER — SODIUM CHLORIDE 0.9 % (FLUSH) 0.9 %
5-40 SYRINGE (ML) INJECTION EVERY 12 HOURS SCHEDULED
Status: DISCONTINUED | OUTPATIENT
Start: 2024-10-01 | End: 2024-10-01 | Stop reason: HOSPADM

## 2024-10-01 RX ORDER — LIDOCAINE HYDROCHLORIDE 20 MG/ML
INJECTION, SOLUTION INTRAVENOUS
Status: DISCONTINUED | OUTPATIENT
Start: 2024-10-01 | End: 2024-10-01 | Stop reason: SDUPTHER

## 2024-10-01 RX ORDER — DEXAMETHASONE SODIUM PHOSPHATE 4 MG/ML
INJECTION, SOLUTION INTRA-ARTICULAR; INTRALESIONAL; INTRAMUSCULAR; INTRAVENOUS; SOFT TISSUE
Status: DISCONTINUED | OUTPATIENT
Start: 2024-10-01 | End: 2024-10-01 | Stop reason: SDUPTHER

## 2024-10-01 RX ORDER — SODIUM CHLORIDE 9 MG/ML
INJECTION, SOLUTION INTRAVENOUS PRN
Status: DISCONTINUED | OUTPATIENT
Start: 2024-10-01 | End: 2024-10-01 | Stop reason: HOSPADM

## 2024-10-01 RX ORDER — ONDANSETRON 2 MG/ML
INJECTION INTRAMUSCULAR; INTRAVENOUS
Status: DISCONTINUED | OUTPATIENT
Start: 2024-10-01 | End: 2024-10-01 | Stop reason: SDUPTHER

## 2024-10-01 RX ORDER — FENTANYL CITRATE 50 UG/ML
INJECTION, SOLUTION INTRAMUSCULAR; INTRAVENOUS
Status: DISCONTINUED | OUTPATIENT
Start: 2024-10-01 | End: 2024-10-01 | Stop reason: SDUPTHER

## 2024-10-01 RX ORDER — PROPOFOL 10 MG/ML
INJECTION, EMULSION INTRAVENOUS
Status: DISCONTINUED | OUTPATIENT
Start: 2024-10-01 | End: 2024-10-01 | Stop reason: SDUPTHER

## 2024-10-01 RX ORDER — MIDAZOLAM HYDROCHLORIDE 1 MG/ML
INJECTION INTRAMUSCULAR; INTRAVENOUS
Status: DISCONTINUED | OUTPATIENT
Start: 2024-10-01 | End: 2024-10-01 | Stop reason: SDUPTHER

## 2024-10-01 RX ORDER — SODIUM CHLORIDE 0.9 % (FLUSH) 0.9 %
5-40 SYRINGE (ML) INJECTION PRN
Status: DISCONTINUED | OUTPATIENT
Start: 2024-10-01 | End: 2024-10-01 | Stop reason: HOSPADM

## 2024-10-01 RX ORDER — DOXYCYCLINE HYCLATE 100 MG
100 TABLET ORAL 2 TIMES DAILY
Qty: 20 TABLET | Refills: 0 | Status: SHIPPED | OUTPATIENT
Start: 2024-10-01 | End: 2024-10-11

## 2024-10-01 RX ADMIN — ONDANSETRON 4 MG: 2 INJECTION INTRAMUSCULAR; INTRAVENOUS at 14:56

## 2024-10-01 RX ADMIN — WATER 2000 MG: 1 INJECTION INTRAMUSCULAR; INTRAVENOUS; SUBCUTANEOUS at 15:06

## 2024-10-01 RX ADMIN — PROPOFOL 150 MG: 10 INJECTION, EMULSION INTRAVENOUS at 15:02

## 2024-10-01 RX ADMIN — FENTANYL CITRATE 50 MCG: 50 INJECTION, SOLUTION INTRAMUSCULAR; INTRAVENOUS at 15:35

## 2024-10-01 RX ADMIN — MIDAZOLAM 2 MG: 1 INJECTION INTRAMUSCULAR; INTRAVENOUS at 14:56

## 2024-10-01 RX ADMIN — SODIUM CHLORIDE: 9 INJECTION, SOLUTION INTRAVENOUS at 15:58

## 2024-10-01 RX ADMIN — DEXAMETHASONE SODIUM PHOSPHATE 10 MG: 4 INJECTION, SOLUTION INTRAMUSCULAR; INTRAVENOUS at 15:05

## 2024-10-01 RX ADMIN — PROPOFOL 50 MG: 10 INJECTION, EMULSION INTRAVENOUS at 15:40

## 2024-10-01 RX ADMIN — SODIUM CHLORIDE: 9 INJECTION, SOLUTION INTRAVENOUS at 14:56

## 2024-10-01 RX ADMIN — FENTANYL CITRATE 50 MCG: 50 INJECTION, SOLUTION INTRAMUSCULAR; INTRAVENOUS at 15:08

## 2024-10-01 RX ADMIN — FENTANYL CITRATE 50 MCG: 50 INJECTION, SOLUTION INTRAMUSCULAR; INTRAVENOUS at 15:01

## 2024-10-01 RX ADMIN — FENTANYL CITRATE 50 MCG: 50 INJECTION, SOLUTION INTRAMUSCULAR; INTRAVENOUS at 15:51

## 2024-10-01 RX ADMIN — LIDOCAINE HYDROCHLORIDE 100 MG: 20 INJECTION, SOLUTION INTRAVENOUS at 15:01

## 2024-10-01 RX ADMIN — Medication 100 MCG: at 15:23

## 2024-10-01 ASSESSMENT — PAIN - FUNCTIONAL ASSESSMENT
PAIN_FUNCTIONAL_ASSESSMENT: NONE - DENIES PAIN
PAIN_FUNCTIONAL_ASSESSMENT: 0-10

## 2024-10-01 NOTE — BRIEF OP NOTE
Brief Postoperative Note      Patient: Bhanu Lombardi  YOB: 1950  MRN: [id number]    Date of Procedure: 10/1/2024    Pre-Op Diagnosis Codes:      * Benign prostatic hyperplasia without lower urinary tract symptoms [N40.0]    Post-Op Diagnosis: Same       Procedure(s):  CYSTOSCOPY GREENLIGHT PHOTO VAPORIZATION OF PROSTATE, CYSTOLITHOPLAXY    Surgeon(s):  Adrian Lee MD    Assistant:  * No surgical staff found *    Anesthesia: General    Estimated Blood Loss (mL): Minimal    Complications: None    Specimens:   * No specimens in log *    Implants:  * No implants in log *      Drains: * No LDAs found *    Findings:  Remove martin tomorrow in office. I did ureteroscopy for distal stone. Remove stent in office in 2 weeks with cystoscopy  Pain meds abx flomax ditropan    Electronically signed by ADRIAN LEE MD on 10/1/2024 at 2:53 PM

## 2024-10-01 NOTE — PROGRESS NOTES
1610 arouses to name on arrival to PACU denies any pain or nausea   1625 pt resting resp easy   1640 resting resp easy   1655 denies any pain or nausea

## 2024-10-01 NOTE — H&P
24 Yes Padmaja Arias PA-C   atorvastatin (LIPITOR) 80 MG tablet Take 0.5 tablets by mouth daily 24  Yes Chao Rodrigues MD   silodosin (RAPAFLO) 8 MG CAPS Take 1 capsule by mouth every evening 24  Yes Zander Salomon PA-C   fluticasone (VERAMYST) 27.5 MCG/SPRAY nasal spray 2 sprays by Each Nostril route daily   Yes Ravi Chavarria MD   fluocinonide (LIDEX) 0.05 % gel Apply topically 2 times daily. 22  Yes Chao Rodrigues MD   hydrocortisone 1 % cream Apply topically 2 times daily Apply topically 2 times daily.   Yes Ravi Chavarria MD   aspirin 81 MG chewable tablet Take 1 tablet by mouth daily   Yes Ravi Chavarria MD   apixaban (ELIQUIS) 5 MG TABS tablet Take 1 tablet by mouth 2 times daily 24   Mercedes Jay MD   loratadine (CLARITIN) 10 MG capsule Take 1 capsule by mouth daily  Patient not taking: Reported on 2024    ProviderRavi MD   Multiple Vitamins-Minerals (ICAPS AREDS 2 PO) Take 1 capsule by mouth 2 times daily    ProviderRavi MD       Allergies:  Adderall [amphetamine-dextroamphetamine], Brilinta [ticagrelor], Nexium [esomeprazole], Flomax [tamsulosin], Metoprolol, Mirabegron, Polysporin [bacitracin-polymyxin b], and Provigil [modafinil]    Social History:    Social History     Socioeconomic History    Marital status:      Spouse name: Lucero    Number of children: 3    Years of education: Not on file    Highest education level: Not on file   Occupational History    Not on file   Tobacco Use    Smoking status: Former     Current packs/day: 0.00     Average packs/day: 1 pack/day for 45.0 years (45.0 ttl pk-yrs)     Types: Cigarettes     Start date: 1960     Quit date: 2005     Years since quittin.2     Passive exposure: Never    Smokeless tobacco: Never   Vaping Use    Vaping status: Never Used   Substance and Sexual Activity    Alcohol use: Not Currently    Drug use: No    Sexual activity: Yes

## 2024-10-01 NOTE — DISCHARGE INSTRUCTIONS
Finish antibiotics   Hold blood thinners for 72 hours (ASA and Eliquis)     Pt ok to discharge home in good condition  No heavy lifting, >10 lbs for today  Pt should avoid strenuous activity for today  Pt should walk moderately at home  Pt ok to shower   Pt may resume diet as tolerated  Pt should take Rx as directed  No driving while on narcotics  Please call attending physician or hospital  with questions  Call or Present to ED if fever (> 101F), intractable nausea vomiting or pain.  Rx in chart     Pt should follow up with [facility], call to confirm appointment

## 2024-10-01 NOTE — ANESTHESIA PRE PROCEDURE
Department of Anesthesiology  Preprocedure Note       Name:  Bhanu Lombardi   Age:  74 y.o.  :  1950                                          MRN:  [id number]         Date:  10/1/2024      Surgeon: Surgeon(s):  Adrian Lee MD    Procedure: Procedure(s):  CYSTOSCOPY GREENLIGHT PHOTO VAPORIZATION OF PROSTATE, CYSTOLITHOPLAXY    Medications prior to admission:   Prior to Admission medications    Medication Sig Start Date End Date Taking? Authorizing Provider   methylphenidate (RITALIN) 20 MG tablet Take 1 tablet by mouth 3 times daily for 90 days. Max Daily Amount: 60 mg 24 Yes Padmaja Arias PA-C   atorvastatin (LIPITOR) 80 MG tablet Take 0.5 tablets by mouth daily 24  Yes Chao Rodrigues MD   silodosin (RAPAFLO) 8 MG CAPS Take 1 capsule by mouth every evening 24  Yes Zander Salomon PA-C   fluticasone (VERAMYST) 27.5 MCG/SPRAY nasal spray 2 sprays by Each Nostril route daily   Yes Ravi Chavraria MD   fluocinonide (LIDEX) 0.05 % gel Apply topically 2 times daily. 22  Yes Chao Rodrigues MD   loratadine (CLARITIN) 10 MG capsule Take 1 capsule by mouth daily   Yes Ravi Chavarria MD   hydrocortisone 1 % cream Apply topically 2 times daily Apply topically 2 times daily.   Yes Ravi Chavarria MD   Multiple Vitamins-Minerals (ICAPS AREDS 2 PO) Take 1 capsule by mouth 2 times daily   Yes Ravi Chavarria MD   aspirin 81 MG chewable tablet Take 1 tablet by mouth daily   Yes Ravi Chavarria MD   apixaban (ELIQUIS) 5 MG TABS tablet Take 1 tablet by mouth 2 times daily 24   Mercedes Jay MD       Current medications:    Current Facility-Administered Medications   Medication Dose Route Frequency Provider Last Rate Last Admin    sodium chloride flush 0.9 % injection 5-40 mL  5-40 mL IntraVENous 2 times per day Adrian Lee MD        sodium chloride flush 0.9 % injection 5-40 mL  5-40 mL IntraVENous PRN Adrian Lee MD        0.9 % sodium

## 2024-10-01 NOTE — ANESTHESIA POSTPROCEDURE EVALUATION
Department of Anesthesiology  Postprocedure Note    Patient: Bhanu Lombardi  MRN: [id number]  YOB: 1950  Date of evaluation: 10/1/2024    Procedure Summary       Date: 10/01/24 Room / Location: [facility]  / [facility] OR    Anesthesia Start: 1456 Anesthesia Stop: 1615    Procedure: CYSTOSCOPY GREENLIGHT PHOTO VAPORIZATION OF PROSTATE, CYSTOLITHOPLAXY, YO and stent placement Diagnosis:       Benign prostatic hyperplasia without lower urinary tract symptoms      (Benign prostatic hyperplasia without lower urinary tract symptoms [N40.0])    Surgeons: Adrian Lee MD Responsible Provider: Lj Pearce MD    Anesthesia Type: general ASA Status: 3            Anesthesia Type: No value filed.    Pearl Phase I: Pearl Score: 10    Pearl Phase II: Pearl Score: 10    Anesthesia Post Evaluation    Patient location during evaluation: PACU  Patient participation: complete - patient participated  Level of consciousness: awake and alert  Airway patency: patent  Nausea & Vomiting: no nausea and no vomiting  Cardiovascular status: hemodynamically stable  Respiratory status: acceptable  Hydration status: euvolemic  Pain management: adequate    [facility]  POST-ANESTHESIA NOTE       Name:  Bhanu Lombardi                                         Age:  74 y.o.  MRN:  [id number]      Last Vitals:  BP (!) 146/68   Pulse 68   Temp 96.8 °F (36 °C) (Temporal)   Resp 16   Ht 1.702 m (5' 7\")   Wt 62.6 kg (138 lb)   SpO2 97%   BMI 21.61 kg/m²   Patient Vitals for the past 4 hrs:   BP Temp Temp src Pulse Resp SpO2   10/01/24 1800 (!) 146/68 -- -- 68 16 97 %   10/01/24 1730 (!) 148/70 -- -- 67 16 97 %   10/01/24 1700 (!) 145/72 96.8 °F (36 °C) Temporal 86 16 97 %   10/01/24 1640 (!) 147/80 -- -- 64 18 98 %   10/01/24 1635 (!) 148/80 -- -- 66 16 99 %   10/01/24 1630 (!) 145/78 -- -- 66 16 98 %   10/01/24 1625 (!) 147/80 -- -- 65 16 99 %   10/01/24 1620 (!) 146/78 -- -- 65 16 98 %   10/01/24 1615 (!) 145/78 -- --

## 2024-10-01 NOTE — PROGRESS NOTES
Pt has met discharge criteria and states he is ready for discharge to home. IV removed, gauze and tape applied. Dressed in own clothes and personal belongings gathered. Discharge instructions (with opioid medication education information) given to pt and family; pt and family verbalized understanding of discharge instructions, prescriptions and follow up appointments. Pt transported to discharge lobby by \[facility]\"" staff.

## 2024-10-02 ENCOUNTER — NURSE ONLY (OUTPATIENT)
Dept: UROLOGY | Age: 74
End: 2024-10-02

## 2024-10-02 DIAGNOSIS — N40.1 BPH WITH OBSTRUCTION/LOWER URINARY TRACT SYMPTOMS: Primary | ICD-10-CM

## 2024-10-02 DIAGNOSIS — N13.8 BPH WITH OBSTRUCTION/LOWER URINARY TRACT SYMPTOMS: Primary | ICD-10-CM

## 2024-10-02 NOTE — OP NOTE
the distal ureter. We were able to flush it out of the distal ureter without basket and laser        We surveyed the ureter. There were no papillary lesions, stricture, ureteral trauma, or papillary lesions.   Repeat ureteroscopy demonstrated no further stone fragments.    A stent was then placed.  the stent was advanced until it was in proper location. The Glidewire was then removed. A curl could be seen in the Left renal pelvis under using fluoroscopic vision, and in the bladder under direct visualization.     A string was NOT left on the stent.        We then removed the bladder calculi. the bladder stones were identified, which was approximately 2.5-3 cm cm in total aggregate.   we used the holmium laser to fragment the stone into small enough pieces that were able to be removed with the Urovac evacuator.      We then performed the Greenlight. The 25F rigid scope with the visual obturator was carefully advanced through the urethra.  .  Once within the bladder the visual obturator was exchanged for the resection bridge.     the ureteral orifices were very close to the bladder neck    The prostate was surveyed. the lateral lobes were noted to be significantly obstructing.. There was a median lobe present. Enucleation of the median lobe was not performed. The vaporization was started proximal with a power setting of 80W. Both the left and right lateral lobes were vaporized in their entirety down to the level of the surgical capsule up to a power of 180 figueroa. With this complete, the apical dissection was carried out delicately. All of the obstructing adenoma was vaporized. Exquisite care was taken to ensure the integrity of the urinary sphincter. Once completed, the sphincter was evaluated and found to be clear of the resection bed, and completely intact. The anterior adenoma was the last tissue to be addressed. The scope was rotated for ease of resection. One last evaluation of the prostatic urethra demonstrated 
Consent: The risks of atrophy were reviewed with the patient.
Kenalog Preparation: Kenalog
Administered By (Optional): NEELA Delgadillo
Include Z78.9 (Other Specified Conditions Influencing Health Status) As An Associated Diagnosis?: No
Medical Necessity Clause: This procedure was medically necessary because the lesions that were treated were:
Treatment Number (Optional): 1
Detail Level: Simple
X Size Of Lesion In Cm (Optional): 0
Concentration Of Kenalog Solution Injected (Mg/Ml): 5.0
Size Of Lesion (Optional): 2

## 2024-10-02 NOTE — PROGRESS NOTES
Patient has given me verbal consent to perform martin removal  Yes    DIAGNOSIS/INDICATION:  post-op, CYSTOSCOPY GREENLIGHT PHOTO VAPORIZATION OF PROSTATE, CYSTOLITHOPLAXY     Per Brenda SOLOMON 10 cc of water deflated from martin balloon. 22 Fr straight martin removed without difficulty.    Foreskin reduced back down?  Yes      Pt will drink fluids and call office before 3 PM or report to ER in 6-8 hours if patient unable to urinate.      F/u with provider as scheduled.

## 2024-10-03 ENCOUNTER — PATIENT MESSAGE (OUTPATIENT)
Dept: UROLOGY | Age: 74
End: 2024-10-03

## 2024-10-03 ENCOUNTER — TELEPHONE (OUTPATIENT)
Dept: UROLOGY | Age: 74
End: 2024-10-03

## 2024-10-03 NOTE — TELEPHONE ENCOUNTER
Patient advised You have a follow up on 10/15/2024 for stent removal. Please refrain from strenuous activity, heavy lifting, or using  until follow up appointment as those activities may aggravate the stent and make your pain worse. You can discuss the limitations after stent removal at the appointment.

## 2024-10-08 ENCOUNTER — TELEPHONE (OUTPATIENT)
Dept: CARDIOLOGY CLINIC | Age: 74
End: 2024-10-08

## 2024-10-08 NOTE — TELEPHONE ENCOUNTER
Please see My Chart Message:     Bhanu Lombardi \"Darin\"  P [facility] Clinical Staff (supporting Mercedes Jay MD)14 hours ago (5:07 PM)       I had a urological procedure on 10/1. They needed me to stop the Eliquis 3 days prior, so my last dose was 9/27. Discharge instructions on 10/1 included not restarting Eliquis for 72 hours post-op. On 10/5 I took my morning dose and within hours there was much blood in my urine, so I have not taken any more.  In short, one dose of Eliquis since 9/27 and I have no plans to resume until I see no signs of blood in my urine. Dr. Lee's office recommended I update you on this issue.

## 2024-10-15 ENCOUNTER — PROCEDURE VISIT (OUTPATIENT)
Dept: UROLOGY | Age: 74
End: 2024-10-15
Payer: MEDICARE

## 2024-10-15 VITALS — BODY MASS INDEX: 21.87 KG/M2 | RESPIRATION RATE: 16 BRPM | WEIGHT: 139.3 LBS | HEIGHT: 67 IN

## 2024-10-15 DIAGNOSIS — N20.1 LEFT URETERAL STONE: Primary | ICD-10-CM

## 2024-10-15 DIAGNOSIS — N21.0 BLADDER STONE: ICD-10-CM

## 2024-10-15 PROCEDURE — 52310 CYSTOSCOPY AND TREATMENT: CPT | Performed by: UROLOGY

## 2024-10-15 NOTE — PROGRESS NOTES
Cystoscopy with left stent removal    Operative Note    Patient:  Bhanu Lombardi  MRN: [id number]  YOB: 1950    Date: 10/15/24  Surgeon: MILTON QUINONES MD  Anesthesia: Urojet Local  Indications: bph, kidney stone  Position: Supine  EBL: 0 ml    Findings:   The patient was prepped and draped in the usual sterile fashion.  The flexible cystoscope was advanced through the urethra and into the bladder.  The bladder was thoroughly inspected and the following was noted:    Residual Urine: moderate.  Urine clear, with no obvious infection  Urethra: No abnormalities of the urethra are noted. Urethral dilation was not performed.    Prostate: open prostatic urethra with no obvious obstruction, intravesical extension of prostate not present. There was a previous TURP defect.   Bladder: no tumor noted .   Moderate trabeculation noted.  no bladder diverticulum.  Ureters: Orifices with normal configuration and location. left stent was removed with graspers in it's entirety    The cystoscope was removed.  The patient tolerated the procedure well.      3 mo pvr w benny

## 2024-10-22 ENCOUNTER — PREP FOR PROCEDURE (OUTPATIENT)
Dept: UROLOGY | Age: 74
End: 2024-10-22

## 2024-10-22 ENCOUNTER — TELEPHONE (OUTPATIENT)
Dept: UROLOGY | Age: 74
End: 2024-10-22

## 2024-10-22 DIAGNOSIS — Z01.818 PRE-OP TESTING: Primary | ICD-10-CM

## 2024-10-22 DIAGNOSIS — R31.9 HEMATURIA, UNSPECIFIED TYPE: ICD-10-CM

## 2024-10-22 NOTE — TELEPHONE ENCOUNTER
Patient scheduled for a CYSTOSCOPY, POSSIBLE CLOT EVACUATION AND FULGURATION with DR QUINONES on 11/1/2024. We are asking for clearance.

## 2024-10-22 NOTE — TELEPHONE ENCOUNTER
DO NOT TAKE  FISH OIL, MOBIC, IBUPROFEN, MOTRIN-LIKE DRUGS AND ANY MULTIVITAMINS OR OVER THE COUNTER SUPPLEMENTS 14 DAYS PRIOR TO SURGERY.    HOLD ASPIRIN 5 DAYS PRIOR TO SURGERY  HOLD ELIQUIS 3 DAYS PRIOR TO SURGERY    MUST HAVE AN ADULT OVER THE AGE OF 18 WITH YOU AT THE TIME OF THE DISCHARGE         Bhanu WORKMAN Harjit 1950     Surgical Physician: Dr. Lee      You have been scheduled for the procedure marked below:      Surgery: CYSTOSCOPY, POSSIBLE CLOT EVACUATION AND FULGURATION         Date: 11/1/2024     Anesthesia:  General     Place of Service: [facility] --Second Floor Same Day Surgery         Arrive to same day surgery at:  7:30AM  (Surgery time is subject to change)      INSTRUCTIONS AS MARKED BELOW:    1.  DO NOT eat or drink anything after midnight before surgery.   2.  We prefer you shower or bathe with an antibacterial soap (Dial) the morning of surgery.  3  Please bring a current medication list, photo ID and insurance card(s) with you  4. Okay to take Tylenol  5. Take blood pressure or heart medication as directed, if taken in the morning take with a small sip of water  6.The office will call you in 1-2 days after your procedure to schedule a follow up.    DATE SENSITIVE PRE OP TESTING:    TO AVOID YOUR SURGERY BEING CANCELLED DO ON THE DATE LISTED *WALK IN *NO APPOINTMENT.      DO URINE CULTURE ON 10/23/2024        Date: 10/22/2024

## 2024-10-22 NOTE — TELEPHONE ENCOUNTER
Patient is scheduled for surgery with  on 11/1/2024. Surgery consent to be done on arrival. Dr. NORMAN to clear. Patient to do pre op URINE CULTURE ON 10/23/2024. Surgery instructions TO BE PICKED UP IN THE OFFICE ON 10/23/2024    Patient informed an adult over the age of 18 must be with them at the time of surgery and upon discharge

## 2024-10-23 ENCOUNTER — LAB (OUTPATIENT)
Dept: LAB | Age: 74
End: 2024-10-23

## 2024-10-23 DIAGNOSIS — Z01.818 PRE-OP TESTING: ICD-10-CM

## 2024-10-23 DIAGNOSIS — R31.9 HEMATURIA, UNSPECIFIED TYPE: ICD-10-CM

## 2024-10-24 RX ORDER — SODIUM CHLORIDE 9 MG/ML
INJECTION, SOLUTION INTRAVENOUS PRN
Status: CANCELLED | OUTPATIENT
Start: 2024-10-24

## 2024-10-24 RX ORDER — SODIUM CHLORIDE 0.9 % (FLUSH) 0.9 %
5-40 SYRINGE (ML) INJECTION EVERY 12 HOURS SCHEDULED
Status: CANCELLED | OUTPATIENT
Start: 2024-10-24

## 2024-10-24 RX ORDER — SODIUM CHLORIDE 0.9 % (FLUSH) 0.9 %
5-40 SYRINGE (ML) INJECTION PRN
Status: CANCELLED | OUTPATIENT
Start: 2024-10-24

## 2024-10-24 NOTE — PROGRESS NOTES
PAT Call Date: LM 10/24   Surgery Date: 11/1    Surgeon: Rosa   Surgery: cysto evac of bld clot    Any Isolation Precautions? No      Type of Isolation Precaution: Not Applicable   Is patient from a nursing home? No  Name of Nursing Home:   Any equipment assist needed for moving patient? No   Type of Equipment: Not Applicable  Patient last weight: 139 lb  LAST CYSTO 10/1   Hard Copy on Chart  In EPIC Pending/Notes   Consent -   Within 30 days; signed, dated & timed by patient and physician     [x] On Arrival     [] Blood      [] DNR   H&P -   Within 30 days    [] Physician To Do     Clearance -    9/4    10/23  [x]Medical Tremoulis     [x]Cardiac Baki asa 5d plavix 3d     [] Pulmonary    Orders -   Signed and Dated    10/22  [] Physician To Do   INR   Labs -   Within 3 months   9/17          10/24 in process  [x] CBC -ok   [x] BMP-ok   [x] GFR-ok   [] INR    [] PTT    [x] Urine    [] Liver Enzymes    [] MRSA Nasal      Others:     Radiology Studies -   Within 1 year  N/a    8/30  [] Chest X-Ray   [] MRI    [x] CT abd   [] Vascular   [] US     Pulmonary -     [x] Apnea and narcolepsy   [] CPAP     Cardiac Workup -   Stress Test, Echo, Cath within 18 months  5/8  11/10/22  5/30  5/30  [x] EKG      [x] Cath                 [x] Stress Test(-)                      [x] Echo/TAMRA 50-55%   [] CABG   [] Holter Monitor      [] Pacemaker/ICD        Brand:        Where does patient have checked:         Last check:         Rep Notified:

## 2024-10-24 NOTE — PROGRESS NOTES
PAT call attempted, patient unavailable, left message to please call us back at your earliest convenience; [phone]

## 2024-10-25 LAB
BACTERIA UR CULT: ABNORMAL
ORGANISM: ABNORMAL

## 2024-10-25 RX ORDER — CIPROFLOXACIN 500 MG/1
500 TABLET, FILM COATED ORAL 2 TIMES DAILY
Qty: 14 TABLET | Refills: 0 | Status: SHIPPED | OUTPATIENT
Start: 2024-10-25 | End: 2024-11-01

## 2024-11-01 ENCOUNTER — ANESTHESIA (OUTPATIENT)
Dept: OPERATING ROOM | Age: 74
End: 2024-11-01
Payer: MEDICARE

## 2024-11-01 ENCOUNTER — HOSPITAL ENCOUNTER (OUTPATIENT)
Age: 74
Setting detail: OUTPATIENT SURGERY
Discharge: HOME | End: 2024-11-01
Attending: UROLOGY | Admitting: UROLOGY
Payer: MEDICARE

## 2024-11-01 ENCOUNTER — ANESTHESIA EVENT (OUTPATIENT)
Dept: OPERATING ROOM | Age: 74
End: 2024-11-01
Payer: MEDICARE

## 2024-11-01 VITALS
HEIGHT: 67 IN | WEIGHT: 133.2 LBS | DIASTOLIC BLOOD PRESSURE: 61 MMHG | SYSTOLIC BLOOD PRESSURE: 107 MMHG | OXYGEN SATURATION: 100 % | BODY MASS INDEX: 20.91 KG/M2 | RESPIRATION RATE: 20 BRPM | TEMPERATURE: 97.4 F | HEART RATE: 53 BPM

## 2024-11-01 DIAGNOSIS — R31.9 HEMATURIA: ICD-10-CM

## 2024-11-01 DIAGNOSIS — G89.18 POST-OPERATIVE PAIN: Primary | ICD-10-CM

## 2024-11-01 PROCEDURE — 2709999900 HC NON-CHARGEABLE SUPPLY: Performed by: UROLOGY

## 2024-11-01 PROCEDURE — 6360000002 HC RX W HCPCS: Performed by: UROLOGY

## 2024-11-01 PROCEDURE — 2580000003 HC RX 258: Performed by: UROLOGY

## 2024-11-01 PROCEDURE — 3700000000 HC ANESTHESIA ATTENDED CARE: Performed by: UROLOGY

## 2024-11-01 PROCEDURE — 7100000001 HC PACU RECOVERY - ADDTL 15 MIN: Performed by: UROLOGY

## 2024-11-01 PROCEDURE — 7100000011 HC PHASE II RECOVERY - ADDTL 15 MIN: Performed by: UROLOGY

## 2024-11-01 PROCEDURE — 88305 TISSUE EXAM BY PATHOLOGIST: CPT

## 2024-11-01 PROCEDURE — 7100000010 HC PHASE II RECOVERY - FIRST 15 MIN: Performed by: UROLOGY

## 2024-11-01 PROCEDURE — 2720000010 HC SURG SUPPLY STERILE: Performed by: UROLOGY

## 2024-11-01 PROCEDURE — 7100000000 HC PACU RECOVERY - FIRST 15 MIN: Performed by: UROLOGY

## 2024-11-01 PROCEDURE — 6360000002 HC RX W HCPCS: Performed by: NURSE ANESTHETIST, CERTIFIED REGISTERED

## 2024-11-01 PROCEDURE — 3600000013 HC SURGERY LEVEL 3 ADDTL 15MIN: Performed by: UROLOGY

## 2024-11-01 PROCEDURE — 3600000003 HC SURGERY LEVEL 3 BASE: Performed by: UROLOGY

## 2024-11-01 PROCEDURE — 3700000001 HC ADD 15 MINUTES (ANESTHESIA): Performed by: UROLOGY

## 2024-11-01 RX ORDER — SODIUM CHLORIDE 0.9 % (FLUSH) 0.9 %
5-40 SYRINGE (ML) INJECTION EVERY 12 HOURS SCHEDULED
Status: DISCONTINUED | OUTPATIENT
Start: 2024-11-01 | End: 2024-11-01 | Stop reason: HOSPADM

## 2024-11-01 RX ORDER — SODIUM CHLORIDE 0.9 % (FLUSH) 0.9 %
5-40 SYRINGE (ML) INJECTION PRN
Status: DISCONTINUED | OUTPATIENT
Start: 2024-11-01 | End: 2024-11-01 | Stop reason: HOSPADM

## 2024-11-01 RX ORDER — NALOXONE HYDROCHLORIDE 0.4 MG/ML
INJECTION, SOLUTION INTRAMUSCULAR; INTRAVENOUS; SUBCUTANEOUS PRN
Status: DISCONTINUED | OUTPATIENT
Start: 2024-11-01 | End: 2024-11-01 | Stop reason: HOSPADM

## 2024-11-01 RX ORDER — SODIUM CHLORIDE 9 MG/ML
INJECTION, SOLUTION INTRAVENOUS PRN
Status: DISCONTINUED | OUTPATIENT
Start: 2024-11-01 | End: 2024-11-01 | Stop reason: HOSPADM

## 2024-11-01 RX ORDER — FENTANYL CITRATE 50 UG/ML
INJECTION, SOLUTION INTRAMUSCULAR; INTRAVENOUS
Status: DISCONTINUED | OUTPATIENT
Start: 2024-11-01 | End: 2024-11-01 | Stop reason: SDUPTHER

## 2024-11-01 RX ORDER — PROPOFOL 10 MG/ML
INJECTION, EMULSION INTRAVENOUS
Status: DISCONTINUED | OUTPATIENT
Start: 2024-11-01 | End: 2024-11-01 | Stop reason: SDUPTHER

## 2024-11-01 RX ORDER — HYDROCODONE BITARTRATE AND ACETAMINOPHEN 5; 325 MG/1; MG/1
1 TABLET ORAL EVERY 6 HOURS PRN
Qty: 10 TABLET | Refills: 0 | Status: SHIPPED | OUTPATIENT
Start: 2024-11-01 | End: 2024-11-04

## 2024-11-01 RX ORDER — LIDOCAINE HYDROCHLORIDE 20 MG/ML
INJECTION, SOLUTION INTRAVENOUS
Status: DISCONTINUED | OUTPATIENT
Start: 2024-11-01 | End: 2024-11-01 | Stop reason: SDUPTHER

## 2024-11-01 RX ORDER — FENTANYL CITRATE 50 UG/ML
25 INJECTION, SOLUTION INTRAMUSCULAR; INTRAVENOUS EVERY 5 MIN PRN
Status: DISCONTINUED | OUTPATIENT
Start: 2024-11-01 | End: 2024-11-01 | Stop reason: HOSPADM

## 2024-11-01 RX ORDER — PHENAZOPYRIDINE HYDROCHLORIDE 200 MG/1
200 TABLET, FILM COATED ORAL 3 TIMES DAILY PRN
Qty: 9 TABLET | Refills: 0 | Status: SHIPPED | OUTPATIENT
Start: 2024-11-01 | End: 2024-11-04

## 2024-11-01 RX ORDER — PHENYLEPHRINE HCL IN 0.9% NACL 1 MG/10 ML
SYRINGE (ML) INTRAVENOUS
Status: DISCONTINUED | OUTPATIENT
Start: 2024-11-01 | End: 2024-11-01 | Stop reason: SDUPTHER

## 2024-11-01 RX ORDER — CIPROFLOXACIN 500 MG/1
500 TABLET, FILM COATED ORAL 2 TIMES DAILY
Qty: 6 TABLET | Refills: 0 | Status: SHIPPED | OUTPATIENT
Start: 2024-11-01 | End: 2024-11-04

## 2024-11-01 RX ORDER — DEXAMETHASONE SODIUM PHOSPHATE 10 MG/ML
INJECTION, EMULSION INTRAMUSCULAR; INTRAVENOUS
Status: DISCONTINUED | OUTPATIENT
Start: 2024-11-01 | End: 2024-11-01 | Stop reason: SDUPTHER

## 2024-11-01 RX ORDER — FENTANYL CITRATE 50 UG/ML
50 INJECTION, SOLUTION INTRAMUSCULAR; INTRAVENOUS EVERY 5 MIN PRN
Status: DISCONTINUED | OUTPATIENT
Start: 2024-11-01 | End: 2024-11-01 | Stop reason: HOSPADM

## 2024-11-01 RX ORDER — ONDANSETRON 2 MG/ML
INJECTION INTRAMUSCULAR; INTRAVENOUS
Status: DISCONTINUED | OUTPATIENT
Start: 2024-11-01 | End: 2024-11-01 | Stop reason: SDUPTHER

## 2024-11-01 RX ADMIN — LIDOCAINE HYDROCHLORIDE 100 MG: 20 INJECTION, SOLUTION INTRAVENOUS at 08:40

## 2024-11-01 RX ADMIN — SODIUM CHLORIDE: 9 INJECTION, SOLUTION INTRAVENOUS at 07:36

## 2024-11-01 RX ADMIN — FENTANYL CITRATE 100 MCG: 50 INJECTION, SOLUTION INTRAMUSCULAR; INTRAVENOUS at 08:41

## 2024-11-01 RX ADMIN — PROPOFOL 200 MG: 10 INJECTION, EMULSION INTRAVENOUS at 08:41

## 2024-11-01 RX ADMIN — PROPOFOL 25 MG: 10 INJECTION, EMULSION INTRAVENOUS at 09:05

## 2024-11-01 RX ADMIN — DEXAMETHASONE SODIUM PHOSPHATE 10 MG: 10 INJECTION, EMULSION INTRAMUSCULAR; INTRAVENOUS at 08:54

## 2024-11-01 RX ADMIN — Medication 200 MCG: at 08:52

## 2024-11-01 RX ADMIN — ONDANSETRON 4 MG: 2 INJECTION INTRAMUSCULAR; INTRAVENOUS at 08:54

## 2024-11-01 RX ADMIN — Medication 200 MCG: at 08:46

## 2024-11-01 RX ADMIN — WATER 2000 MG: 1 INJECTION INTRAMUSCULAR; INTRAVENOUS; SUBCUTANEOUS at 08:53

## 2024-11-01 RX ADMIN — PROPOFOL 25 MG: 10 INJECTION, EMULSION INTRAVENOUS at 09:00

## 2024-11-01 ASSESSMENT — PAIN SCALES - GENERAL
PAINLEVEL_OUTOF10: 0

## 2024-11-01 ASSESSMENT — PAIN - FUNCTIONAL ASSESSMENT: PAIN_FUNCTIONAL_ASSESSMENT: 0-10

## 2024-11-01 NOTE — H&P
ADRIAN LEE MD  History and Physical    Patient:  Bhanu Lombardi  MRN: [id number]  YOB: 1950    HISTORY OF PRESENT ILLNESS:     The patient is a 74 y.o. male who presents with gross hematuria. Here for procedure.    Patient's old records, notes and chart reviewed and summarized above.     ADRIAN LEE MD independently reviewed the images and verified the radiology reports from:    No results found.      Past Medical History:    Past Medical History:   Diagnosis Date    Actinic keratoses     Angina effort      Replacing deprecated diagnoses    Arthritis     hands    ASHD (arteriosclerotic heart disease)     Bladder stones     Carotid disease, bilateral (HCC) 2017    Colon polyps 05/2007    Hypercholesteremia 07/1997    Narcolepsy     Paroxysmal atrial fibrillation (HCC)     Rotator cuff (capsule) sprain     TIA (transient ischemic attack)     Tobacco abuse     Unspecified sleep apnea     cpap       Past Surgical History:    Past Surgical History:   Procedure Laterality Date    APPENDECTOMY      CATARACT REMOVAL Bilateral 2016    Right- Oct, Left- Dec- DR ROBISON     COLONOSCOPY  08/16/2024       colon polyps and  do  5  years    CORONARY ANGIOPLASTY WITH STENT PLACEMENT  07/2005    CORONARY ANGIOPLASTY WITH STENT PLACEMENT  11/2017    2 nd stent to LAD    CORONARY ANGIOPLASTY WITH STENT PLACEMENT  11/09/2022    Dr Corbett/Dr Pearce @ [facility]    HEMORRHOID SURGERY  09/2016    Dr Macario, every 2 weeks x 3 times     ROTATOR CUFF REPAIR Right 2008    Dr. Jacobs    ROTATOR CUFF REPAIR Left 2015    Dr Jacobs @ [facility]   rt 2015    TONSILLECTOMY      TURP N/A 10/1/2024    CYSTOSCOPY GREENLIGHT PHOTO VAPORIZATION OF PROSTATE, CYSTOLITHOPLAXY, YO and stent placement performed by Adrian Lee MD at [facility] OR       Medications Prior to Admission:    Prior to Admission medications    Medication Sig Start Date End Date Taking? Authorizing Provider   ciprofloxacin (CIPRO) 500 MG tablet Take 1 tablet by mouth 2 times  place and time.    Psych: Mood and affect normal.  Skin: Normal  Lungs: Respiratory effort normal, CTA  Cardiovascular:  Normal peripheral pulses; no murmur. Normal rhythm  Abdomen: Soft, non-tender, non-distended with no CVA, flank pain, hepatosplenomegaly or hernia.  Kidneys normal.  Bladder non-tender and not distended.      LABS:   No results for input(s): \"WBC\", \"HGB\", \"HCT\", \"MCV\", \"PLT\" in the last 72 hours.  No results for input(s): \"NA\", \"K\", \"CL\", \"CO2\", \"PHOS\", \"BUN\", \"CREATININE\" in the last 72 hours.    Invalid input(s): \"CA\"  Lab Results   Component Value Date    PSA 2.92 (H) 03/02/2017    PSA 2.64 (H) 02/04/2016    PSA 2.24 02/19/2015         Urinalysis: No results for input(s): \"COLORU\", \"PHUR\", \"LABCAST\", \"WBCUA\", \"RBCUA\", \"MUCUS\", \"TRICHOMONAS\", \"YEAST\", \"BACTERIA\", \"CLARITYU\", \"SPECGRAV\", \"LEUKOCYTESUR\", \"UROBILINOGEN\", \"BILIRUBINUR\", \"BLOODU\" in the last 72 hours.    Invalid input(s): \"NITRATE\", \"GLUCOSEUKETONESUAMORPHOUS\"     -----------------------------------------------------------------      Assessment and Plan     Impression:    Patient Active Problem List   Diagnosis    Atherosclerotic heart disease of native coronary artery with other forms of angina pectoris (HCC)    Hypercholesteremia    Hearing loss    Arthritis    Colon polyps    Narcolepsy    Rotator cuff (capsule) sprain    Complex sleep apnea syndrome    Central sleep apnea due to Cheyne-Santos respiration    Angina of effort (HCC)    Paroxysmal atrial fibrillation (HCC)    SVT (supraventricular tachycardia) (HCC)    Status post angioplasty with stent    BPH with obstruction/lower urinary tract symptoms    Lichen planus    Secondary hypercoagulable state (HCC)    DIAMOND (acute kidney injury) (HCC)    Left flank pain    Hematuria       Plan:     Consent obtained; cysto clot evac/fulg in OR today    MILTON QUINONES MD  7:08 AM 11/1/2024

## 2024-11-01 NOTE — ANESTHESIA POSTPROCEDURE EVALUATION
Department of Anesthesiology  Postprocedure Note    Patient: Bhanu Lombardi  MRN: [id number]  YOB: 1950  Date of evaluation: 11/1/2024    Procedure Summary       Date: 11/01/24 Room / Location: [facility] OR 08 / [facility] OR    Anesthesia Start: 0838 Anesthesia Stop: 0916    Procedure: CYSTOSCOPY CLOT EVACUATION AND FULGURATION TURP Diagnosis:       Hematuria      (Hematuria [R31.9])    Surgeons: Adrian Lee MD Responsible Provider: Trever Canseco DO    Anesthesia Type: general ASA Status: 3            Anesthesia Type: No value filed.    Pearl Phase I: Pearl Score: 10    Pearl Phase II:      Anesthesia Post Evaluation    Comments: [facility]  POST-ANESTHESIA NOTE       Name:  Bhanu Lombardi                                         Age:  74 y.o.  MRN:  [id number]      Last Vitals:  /66   Pulse 69   Temp 97.4 °F (36.3 °C) (Temporal)   Resp 20   Ht 1.702 m (5' 7\")   Wt 60.4 kg (133 lb 3.2 oz)   SpO2 100%   BMI 20.86 kg/m²   Patient Vitals in the past 4 hrs:  11/01/24 1026, BP:113/66, Pulse:69, Resp:20, SpO2:100 %  11/01/24 1000, BP:115/63, Pulse:(!) 48, Resp:20, SpO2:98 %  11/01/24 0945, BP:(!) 149/65, Pulse:54, Resp:11, SpO2:100 %  11/01/24 0940, BP:131/75, Pulse:50, Resp:11, SpO2:100 %  11/01/24 0935, BP:113/61, Pulse:(!) 45, Resp:10, SpO2:100 %  11/01/24 0930, BP:(!) 112/57, Pulse:(!) 45, Resp:10, SpO2:100 %  11/01/24 0925, BP:(!) 88/58, Pulse:53, Resp:10, SpO2:100 %  11/01/24 0920, Pulse:55, Resp:10, SpO2:100 %  11/01/24 0916, BP:(!) 105/59, Temp:97.4 °F (36.3 °C), Temp src:Temporal, Pulse:62, Resp:11, SpO2:100 %  11/01/24 0709, BP:(!) 141/78, Temp:(!) 79.8 °F (26.6 °C), Temp src:Temporal, Pulse:72, Resp:16, SpO2:98 %, Height:1.702 m (5' 7\"), Weight:60.4 kg (133 lb 3.2 oz)    Level of Consciousness:  Awake    Respiratory:  Stable    Oxygen Saturation:  Stable    Cardiovascular:  Stable    Hydration:  Adequate    PONV:  Stable    Post-op Pain:  Adequate analgesia    Post-op

## 2024-11-01 NOTE — PROGRESS NOTES
Pt returned to SDS room 6. Vitals and assessment as charted. 0.9 infusing, @500ml to count from PACU. Pt has a muffin and water. Family at the bedside. Pt and family verbalized understanding of discharge criteria and call light use. Call light in reach.

## 2024-11-01 NOTE — PROGRESS NOTES
Patient oriented to Same Day department and admitted to Same Day Surgery room 6.   Patient verbalized approval for first name, last initial with physician name on unit whiteboard.     Plan of care reviewed with patient.   Patient room whiteboard filled out and discussed with patient and responsible adult.   Patient and responsible adult offered Same Day Welcome Packet to review.    Call light in reach.   Bed in lowest position, locked, with one bed rail up.   SCDs and warming blanket in place.  Appropriate arm bands on patient.   Bathroom offered.   All questions and concerns of patient addressed.        Meds to Beds:   Patient informed of [facility] program during admission. Patient is agreeable to program.   Contact information for the pharmacy and the Meds to Beds program:      Lcuero Lombardi (Spouse)  [phone] (Mobile)

## 2024-11-01 NOTE — BRIEF OP NOTE
Brief Postoperative Note      Patient: Bhanu Lombardi  YOB: 1950  MRN: [id number]    Date of Procedure: 11/1/2024    Pre-Op Diagnosis Codes:      * Hematuria [R31.9]    Post-Op Diagnosis: Same       Procedure(s):  CYSTOSCOPY CLOT EVACUATION AND FULGURATION TURP    Surgeon(s):  Adrian Lee MD    Assistant:  * No surgical staff found *    Anesthesia: General    Estimated Blood Loss (mL): Minimal    Complications: None    Specimens:   ID Type Source Tests Collected by Time Destination   A : TURP CHIPS Tissue Prostate SURGICAL PATHOLOGY Adiran Lee MD 11/1/2024 0859        Implants:  * No implants in log *      Drains:   Urinary Catheter 11/01/24 2 Way (Active)   Site Assessment No urethral drainage 11/01/24 0907   Urine Color Pink 11/01/24 0907   Urine Appearance Clear 11/01/24 0907   Collection Container Standard 11/01/24 0907       [REMOVED] Urinary Catheter 10/01/24 2 Way (Removed)       Findings:  Prostatic fossa bleeding. Cauterizing would not stop as tissue was friable. I did TURP the tissue. 18 [place] martin until Monday.  Pyridium abx pain meds. Restart blood thinners in 72 hours    Electronically signed by ADRIAN LEE MD on 11/1/2024 at 9:22 AM

## 2024-11-01 NOTE — DISCHARGE INSTRUCTIONS
Finish antibiotics  Norco for pain  Pyridim for pain when urinating    Pt ok to discharge home in good condition  No heavy lifting, >10 lbs for today  Pt should avoid strenuous activity for today  Pt should walk moderately at home  Pt ok to shower   Pt may resume diet as tolerated  Pt should take Rx as directed  No driving while on narcotics  Please call attending physician or hospital  with questions  Call or Present to ED if fever (> 101F), intractable nausea vomiting or pain.  Rx in chart     Pt should follow up with Dr Lee, call to confirm appointment   Restart blood thinners in 72 hours   Lara removal Monday in office

## 2024-11-01 NOTE — PROGRESS NOTES
0916 pt to pacu. Non responsive. Oral airway in place, simple mask at 8L. VSS.     0920 pt remains non responsive. VSS    0930 Patient unresponsive to voice at this time, resp easy and unlabored on simple mask at 8L.     0932 Patient awakens to voice. OPA  and simple mask removed. Pt denies pain and nausea at this time.     0945 Patient resting in bed with eyes closed, denies pain and nausea at this time. VSS.      0946 Patient meets criteria for discharge from PACU at this time.     0950 Pt to [facility] in stable condition     0951 Report given to Umm LOPEZ

## 2024-11-01 NOTE — ANESTHESIA PRE PROCEDURE
Value Date/Time    COVID19 NOT DETECTED 05/20/2020 12:44 PM           Anesthesia Evaluation  Patient summary reviewed  Airway: Mallampati: II  TM distance: >3 FB   Neck ROM: full  Mouth opening: > = 3 FB   Dental:          Pulmonary:   (+)     sleep apnea:                                  Cardiovascular:    (+) angina:, CAD:, CABG/stent:      ECG reviewed        Stress test reviewed                Neuro/Psych:   (+) TIA            GI/Hepatic/Renal:             Endo/Other:                     Abdominal:             Vascular:          Other Findings:       Anesthesia Plan      general     ASA 3       Induction: intravenous.    MIPS: Postoperative opioids intended and Prophylactic antiemetics administered.  Anesthetic plan and risks discussed with patient and spouse.      Plan discussed with CRNA.                Trever Canseco DO   11/1/2024

## 2024-11-01 NOTE — PROGRESS NOTES

## 2024-11-03 ENCOUNTER — HOSPITAL ENCOUNTER (EMERGENCY)
Age: 74
Discharge: HOME | End: 2024-11-03
Payer: MEDICARE

## 2024-11-03 VITALS
TEMPERATURE: 97.9 F | SYSTOLIC BLOOD PRESSURE: 141 MMHG | DIASTOLIC BLOOD PRESSURE: 88 MMHG | HEART RATE: 72 BPM | BODY MASS INDEX: 20.88 KG/M2 | HEIGHT: 67 IN | RESPIRATION RATE: 19 BRPM | WEIGHT: 133 LBS | OXYGEN SATURATION: 98 %

## 2024-11-03 DIAGNOSIS — Z46.6 ENCOUNTER FOR REMOVAL OF URINARY CATHETER: Primary | ICD-10-CM

## 2024-11-03 DIAGNOSIS — N39.0 URINARY TRACT INFECTION ASSOCIATED WITH CATHETERIZATION OF URINARY TRACT, UNSPECIFIED INDWELLING URINARY CATHETER TYPE, INITIAL ENCOUNTER (HCC): ICD-10-CM

## 2024-11-03 DIAGNOSIS — T83.511A URINARY TRACT INFECTION ASSOCIATED WITH CATHETERIZATION OF URINARY TRACT, UNSPECIFIED INDWELLING URINARY CATHETER TYPE, INITIAL ENCOUNTER (HCC): ICD-10-CM

## 2024-11-03 LAB
AMORPH SED URNS QL MICRO: ABNORMAL
ANION GAP SERPL CALC-SCNC: 10 MEQ/L (ref 8–16)
BACTERIA URNS QL MICRO: ABNORMAL /HPF
BASOPHILS ABSOLUTE: 0.1 THOU/MM3 (ref 0–0.1)
BASOPHILS NFR BLD AUTO: 1.1 %
BILIRUB UR QL STRIP.AUTO: NEGATIVE
BUN SERPL-MCNC: 10 MG/DL (ref 7–22)
CALCIUM SERPL-MCNC: 8.3 MG/DL (ref 8.5–10.5)
CASTS #/AREA URNS LPF: ABNORMAL /LPF
CASTS 2: ABNORMAL /LPF
CHARACTER UR: ABNORMAL
CHLORIDE SERPL-SCNC: 104 MEQ/L (ref 98–111)
CO2 SERPL-SCNC: 30 MEQ/L (ref 23–33)
COLOR, UA: ABNORMAL
CREAT SERPL-MCNC: 1 MG/DL (ref 0.4–1.2)
CRYSTALS URNS MICRO: ABNORMAL
DEPRECATED RDW RBC AUTO: 42.5 FL (ref 35–45)
EOSINOPHIL NFR BLD AUTO: 1 %
EOSINOPHILS ABSOLUTE: 0.1 THOU/MM3 (ref 0–0.4)
EPITHELIAL CELLS, UA: ABNORMAL /HPF
ERYTHROCYTE [DISTWIDTH] IN BLOOD BY AUTOMATED COUNT: 12.4 % (ref 11.5–14.5)
GFR SERPL CREATININE-BSD FRML MDRD: 79 ML/MIN/1.73M2
GLUCOSE SERPL-MCNC: 106 MG/DL (ref 70–108)
GLUCOSE UR QL STRIP.AUTO: NEGATIVE MG/DL
HCT VFR BLD AUTO: 43.8 % (ref 42–52)
HGB BLD-MCNC: 14.7 GM/DL (ref 14–18)
HGB UR QL STRIP.AUTO: ABNORMAL
IMM GRANULOCYTES # BLD AUTO: 0.02 THOU/MM3 (ref 0–0.07)
IMM GRANULOCYTES NFR BLD AUTO: 0.3 %
KETONES UR QL STRIP.AUTO: NEGATIVE
LYMPHOCYTES ABSOLUTE: 1.4 THOU/MM3 (ref 1–4.8)
LYMPHOCYTES NFR BLD AUTO: 17.8 %
MCH RBC QN AUTO: 31.5 PG (ref 26–33)
MCHC RBC AUTO-ENTMCNC: 33.6 GM/DL (ref 32.2–35.5)
MCV RBC AUTO: 93.8 FL (ref 80–94)
MISCELLANEOUS 2: ABNORMAL
MONOCYTES ABSOLUTE: 0.7 THOU/MM3 (ref 0.4–1.3)
MONOCYTES NFR BLD AUTO: 8.8 %
MUCOUS THREADS URNS QL MICRO: ABNORMAL
NEUTROPHILS ABSOLUTE: 5.6 THOU/MM3 (ref 1.8–7.7)
NEUTROPHILS NFR BLD AUTO: 71 %
NITRITE UR QL STRIP: POSITIVE
NRBC BLD AUTO-RTO: 0 /100 WBC
OSMOLALITY SERPL CALC.SUM OF ELEC: 286.3 MOSMOL/KG (ref 275–300)
PH UR STRIP.AUTO: 5.5 [PH] (ref 5–9)
PLATELET # BLD AUTO: 246 THOU/MM3 (ref 130–400)
PMV BLD AUTO: 8.8 FL (ref 9.4–12.4)
POTASSIUM SERPL-SCNC: 3.4 MEQ/L (ref 3.5–5.2)
PROT UR STRIP.AUTO-MCNC: 100 MG/DL
RBC # BLD AUTO: 4.67 MILL/MM3 (ref 4.7–6.1)
RBC URINE: > 200 /HPF
RENAL EPI CELLS #/AREA URNS HPF: ABNORMAL /[HPF]
SODIUM SERPL-SCNC: 144 MEQ/L (ref 135–145)
SP GR UR REFRACT.AUTO: 1.01 (ref 1–1.03)
UROBILINOGEN, URINE: 1 EU/DL (ref 0–1)
WBC # BLD AUTO: 7.9 THOU/MM3 (ref 4.8–10.8)
WBC #/AREA URNS HPF: > 200 /HPF
WBC #/AREA URNS HPF: ABNORMAL /[HPF]
YEAST LIKE FUNGI URNS QL MICRO: ABNORMAL

## 2024-11-03 PROCEDURE — 81001 URINALYSIS AUTO W/SCOPE: CPT

## 2024-11-03 PROCEDURE — 51798 US URINE CAPACITY MEASURE: CPT

## 2024-11-03 PROCEDURE — 36415 COLL VENOUS BLD VENIPUNCTURE: CPT

## 2024-11-03 PROCEDURE — 99283 EMERGENCY DEPT VISIT LOW MDM: CPT

## 2024-11-03 PROCEDURE — 87086 URINE CULTURE/COLONY COUNT: CPT

## 2024-11-03 PROCEDURE — 80048 BASIC METABOLIC PNL TOTAL CA: CPT

## 2024-11-03 PROCEDURE — 85025 COMPLETE CBC W/AUTO DIFF WBC: CPT

## 2024-11-03 ASSESSMENT — PAIN - FUNCTIONAL ASSESSMENT: PAIN_FUNCTIONAL_ASSESSMENT: NONE - DENIES PAIN

## 2024-11-03 NOTE — OP NOTE
Adrian Lee MD.  Urologic Surgery      [place], [place]. [facility]    DATE: 11/1/2024  Patient:  Bhanu Lombardi  MRN: [id number]  YOB: 1950    SURGEON: Adrian Lee MD.    ASSISTANT: none    PREOPERATIVE DIAGNOSIS: BPH with outlet obstruction    POSTOPERATIVE DIAGNOSIS: BPH with outlet obstruction    PROCEDURE PERFORMED: Cystoscopy, Transurethral resection of Prostate ,       ANESTHESIA: General    COMPLICATIONS: none    OR BLOOD LOSS:  Minimal    FLUIDS: Cystalloids per Anesthesia    SPECIMENS:  ID Type Source Tests Collected by Time Destination   A : TURP CHIPS Tissue Prostate SURGICAL PATHOLOGY Adrian Lee MD 11/1/2024 0859          DRAINS: [address] 3 way martin    INDICATIONS FOR PROCEDURE:  The patient is a 74 y.o. male who presents today with Hematuria [R31.9] here for CYSTOSCOPY CLOT EVACUATION AND FULGURATION TURP. After risks, benefits and alternatives of the procedure were discussed with the patient, the patient elected to proceed.     DETAILS OF PROCEDURE:  After informed consent was obtained, the patient was taken to the operating room. He was transferred to the operating table in the supine position. Anesthesia was induced and antibiotics were given. He was placed in a modified dorsal lithotomy position. He was sterilely prepped and draped in a standard fashion. A timeout occurred in which 2 patient identifiers were used.     We entered his urethra with a 25F scope with the visual obturator in place.. .     The prostate was surveyed. The fossa was wide open and there was some friable prostate tissue    Once within the prostatic urethra, we changed out into our working loop. We located both the ureteral orifices. the ureteral orifices were very close to the bladder neck    The previous prostatic resection bed was oozing. We tried cauterizing but the tissue was too friable. We then resected this tissue.     We began by creating a trough at the 5 o'clock position all the way from the

## 2024-11-03 NOTE — ED PROVIDER NOTES
[facility] EMERGENCY DEPT      EMERGENCY MEDICINE     Pt Name: Bhanu Lombardi  MRN: [id number]  Birthdate 1950  Date of evaluation: 11/3/2024  Provider: Tom Durán PA-C    CHIEF COMPLAINT       Chief Complaint   Patient presents with    Urinary Catheter     HISTORY OF PRESENT ILLNESS   Bhanu Lombardi is a pleasant 74 y.o. male who presents to the emergency department from from home, by private vehicle for evaluation of urinary catheter problems.    Patient presents to the ED with his wife for urinary problems.  He states that he had a cystoscopy and a fulguration TURP on 11/1/2024 with Dr. Lee.  Dr. Lee stated that the catheter could be removed 24 hours after the surgery.  Patient did not know how to remove it by himself and the urinary catheter stayed on.  He is concerned that he is not having much of a urinary output nor an urge to urinate.  He states no change in fluid intake.  Patient is on pyridine but states urine is usually not this red.  He denies any pressure in the bladder, fever/chills, nausea/vomiting, abdominal pain, constipation/diarrhea.  Patient reports that he recently completed a 10-day course of Cipro due to his urine having some unspecified colonization.    PASTMEDICAL HISTORY     Past Medical History:   Diagnosis Date    Actinic keratoses     Angina effort      Replacing deprecated diagnoses    Arthritis     hands    ASHD (arteriosclerotic heart disease)     Bladder stones     Carotid disease, bilateral (HCC) 2017    Colon polyps 05/2007    Hypercholesteremia 07/1997    Narcolepsy     Paroxysmal atrial fibrillation (HCC)     Rotator cuff (capsule) sprain     TIA (transient ischemic attack)     Tobacco abuse     Unspecified sleep apnea     cpap       Patient Active Problem List   Diagnosis Code    Atherosclerotic heart disease of native coronary artery with other forms of angina pectoris (HCC) I25.118    Hypercholesteremia E78.00    Hearing loss H91.90    Arthritis M19.90    Colon

## 2024-11-05 LAB — BACTERIA UR CULT: NORMAL

## 2024-11-20 ENCOUNTER — OFFICE VISIT (OUTPATIENT)
Dept: UROLOGY | Age: 74
End: 2024-11-20
Payer: MEDICARE

## 2024-11-20 ENCOUNTER — PATIENT MESSAGE (OUTPATIENT)
Dept: CARDIOLOGY CLINIC | Age: 74
End: 2024-11-20

## 2024-11-20 VITALS — HEIGHT: 67 IN | RESPIRATION RATE: 18 BRPM | BODY MASS INDEX: 21.19 KG/M2 | WEIGHT: 135 LBS

## 2024-11-20 DIAGNOSIS — N13.8 BPH WITH OBSTRUCTION/LOWER URINARY TRACT SYMPTOMS: ICD-10-CM

## 2024-11-20 DIAGNOSIS — R97.20 ELEVATED PSA: ICD-10-CM

## 2024-11-20 DIAGNOSIS — N40.1 BPH WITH OBSTRUCTION/LOWER URINARY TRACT SYMPTOMS: ICD-10-CM

## 2024-11-20 DIAGNOSIS — R31.0 GROSS HEMATURIA: Primary | ICD-10-CM

## 2024-11-20 LAB
BILIRUBIN, URINE: NEGATIVE
BLOOD URINE, POC: ABNORMAL
CHARACTER, URINE: CLEAR
COLOR, UA: YELLOW
GLUCOSE URINE: NEGATIVE MG/DL
KETONES, URINE: NEGATIVE
LEUKOCYTE CLUMPS, URINE: ABNORMAL
NITRITE, URINE: NEGATIVE
PH, URINE: 5.5 (ref 5–9)
POST VOID RESIDUAL (PVR): 38 ML
PROTEIN, URINE: >= 300 MG/DL
SPECIFIC GRAVITY UA: >= 1.03 (ref 1–1.03)
UROBILINOGEN, URINE: 0.2 EU/DL (ref 0–1)

## 2024-11-20 PROCEDURE — 51798 US URINE CAPACITY MEASURE: CPT

## 2024-11-20 PROCEDURE — 99024 POSTOP FOLLOW-UP VISIT: CPT

## 2024-11-20 PROCEDURE — 81003 URINALYSIS AUTO W/O SCOPE: CPT

## 2024-11-20 RX ORDER — FINASTERIDE 5 MG/1
5 TABLET, FILM COATED ORAL DAILY
Qty: 90 TABLET | Refills: 3 | Status: SHIPPED | OUTPATIENT
Start: 2024-11-20

## 2024-11-20 NOTE — PROGRESS NOTES
tablets by mouth daily 90 tablet 1    silodosin (RAPAFLO) 8 MG CAPS Take 1 capsule by mouth every evening 90 capsule 3    fluticasone (VERAMYST) 27.5 MCG/SPRAY nasal spray 2 sprays by Each Nostril route daily      fluocinonide (LIDEX) 0.05 % gel Apply topically 2 times daily. 15 g 1    loratadine (CLARITIN) 10 MG capsule Take 1 capsule by mouth daily      hydrocortisone 1 % cream Apply topically 2 times daily Apply topically 2 times daily.      aspirin 81 MG chewable tablet Take 1 tablet by mouth daily         Adderall [amphetamine-dextroamphetamine], Brilinta [ticagrelor], Nexium [esomeprazole], Flomax [tamsulosin], Metoprolol, Mirabegron, Polysporin [bacitracin-polymyxin b], and Provigil [modafinil]  Social History     Tobacco Use   Smoking Status Former    Current packs/day: 0.00    Average packs/day: 1 pack/day for 45.0 years (45.0 ttl pk-yrs)    Types: Cigarettes    Start date: 1960    Quit date: 2005    Years since quittin.4    Passive exposure: Never   Smokeless Tobacco Never       Social History     Substance and Sexual Activity   Alcohol Use Not Currently       REVIEW OF SYSTEMS:  Constitutional: negative  Eyes: negative  Respiratory: negative  Cardiovascular: negative  Gastrointestinal: negative  Musculoskeletal: negative  Genitourinary: negative except for what is in HPI  Skin: negative   Neurological: negative  Hematological/Lymphatic: negative  Psychological: negative    Physical Exam:      Vitals:    24 0956   Resp: 18     Patient is a 74 y.o. male in no acute distress and alert and oriented to person, place and time.    Pulmonary: Non-labored respiration.  Cardiovascular: Normal rate, regular rhythm, normal peripheral pulses.  Skin: Warm and dry.  Psych: Normal mood and affect.  Genitourinary: Bladder non-distended and non-tender.      Assessment and Plan   BPH w/ LUTS  Weak Urinary Stream  Gross Hematuria  - S/P TURP and Greenlight,  and 10/01, respectively.   - Gross

## 2024-11-20 NOTE — PATIENT INSTRUCTIONS
Possible reduction/alteration in oral anticoagulation.     Start Finasteride 5 mg once daily (reduction in blood flow to the prostate).

## 2024-11-21 ENCOUNTER — TELEPHONE (OUTPATIENT)
Dept: CARDIOLOGY CLINIC | Age: 74
End: 2024-11-21

## 2024-11-21 NOTE — TELEPHONE ENCOUNTER
Bhanu Lombardi \"Darin\"   to [facility] Clinical Staff (supporting Mercedes Jay MD)         11/20/24 10:18 PM  Urologic procedures performed by Dr Lee on 10/1.  Aspirin and eliquis suspended prior to the procedures.     There has been evidence of blood in my urine ever since, and the amount of blood increases dramatically after taking eliquis.  As a result, very few doses of eliquis have been taken since 10/1.     11/20:  Follow-up with JAEL Salomon (Dr. Lee office).  For a trial period, to minimize bleeding, he suggested I confer with you to see if it would be appropriate to cut eliquis pills in half, and if not appropriate, consider a different medication.  Keep in mind that Brilinta and my system do not get along.     To restrict blood flow to the prostate, he prescribed finasteride, 5mg.  Finasteride blocks an enzyme that changes testosterone to another hormone. Before I fill that prescription, I would like your opinion regarding increased testosterone and if it would adversely affect my circulatory

## 2025-01-02 ENCOUNTER — PATIENT MESSAGE (OUTPATIENT)
Dept: UROLOGY | Age: 75
End: 2025-01-02

## 2025-01-02 DIAGNOSIS — R35.0 FREQUENCY OF URINATION: ICD-10-CM

## 2025-01-02 DIAGNOSIS — R39.15 URGENCY OF URINATION: Primary | ICD-10-CM

## 2025-01-02 NOTE — TELEPHONE ENCOUNTER
Pt called and states he wanted a follow up with Zander and made him a follow up on 1/7/2025 with Zander

## 2025-01-02 NOTE — TELEPHONE ENCOUNTER
Agree with plan.  He can check Urine micro and culture while waiting as well.       If fevers, chills, uncontrolled pain or unable to urinate go to ER

## 2025-01-07 ENCOUNTER — OFFICE VISIT (OUTPATIENT)
Dept: UROLOGY | Age: 75
End: 2025-01-07
Payer: MEDICARE

## 2025-01-07 VITALS — BODY MASS INDEX: 20.4 KG/M2 | WEIGHT: 130 LBS | RESPIRATION RATE: 20 BRPM | HEIGHT: 67 IN

## 2025-01-07 DIAGNOSIS — R35.0 FREQUENCY OF URINATION: ICD-10-CM

## 2025-01-07 DIAGNOSIS — N40.1 BPH WITH OBSTRUCTION/LOWER URINARY TRACT SYMPTOMS: Primary | ICD-10-CM

## 2025-01-07 DIAGNOSIS — R31.0 GROSS HEMATURIA: ICD-10-CM

## 2025-01-07 DIAGNOSIS — N32.81 OAB (OVERACTIVE BLADDER): ICD-10-CM

## 2025-01-07 DIAGNOSIS — N13.8 BPH WITH OBSTRUCTION/LOWER URINARY TRACT SYMPTOMS: Primary | ICD-10-CM

## 2025-01-07 LAB
BILIRUBIN, URINE: NEGATIVE
BLOOD URINE, POC: ABNORMAL
CHARACTER, URINE: CLEAR
COLOR, UA: YELLOW
GLUCOSE URINE: NEGATIVE MG/DL
KETONES, URINE: ABNORMAL
LEUKOCYTE CLUMPS, URINE: NEGATIVE
NITRITE, URINE: NEGATIVE
PH, URINE: 5.5 (ref 5–9)
PROTEIN, URINE: 100 MG/DL
SPECIFIC GRAVITY UA: >= 1.03 (ref 1–1.03)
UROBILINOGEN, URINE: 0.2 EU/DL (ref 0–1)

## 2025-01-07 PROCEDURE — G8427 DOCREV CUR MEDS BY ELIG CLIN: HCPCS

## 2025-01-07 PROCEDURE — 81003 URINALYSIS AUTO W/O SCOPE: CPT

## 2025-01-07 PROCEDURE — 1159F MED LIST DOCD IN RCRD: CPT

## 2025-01-07 PROCEDURE — 3017F COLORECTAL CA SCREEN DOC REV: CPT

## 2025-01-07 PROCEDURE — 1123F ACP DISCUSS/DSCN MKR DOCD: CPT

## 2025-01-07 PROCEDURE — 1036F TOBACCO NON-USER: CPT

## 2025-01-07 PROCEDURE — G8420 CALC BMI NORM PARAMETERS: HCPCS

## 2025-01-07 PROCEDURE — 99024 POSTOP FOLLOW-UP VISIT: CPT

## 2025-01-07 RX ORDER — VIBEGRON 75 MG/1
75 TABLET, FILM COATED ORAL DAILY
Qty: 28 TABLET | Refills: 0 | Status: SHIPPED | COMMUNITY
Start: 2025-01-07

## 2025-01-07 NOTE — PROGRESS NOTES
Replacing deprecated diagnoses    Arthritis     hands    ASHD (arteriosclerotic heart disease)     Bladder stones     Carotid disease, bilateral (HCC) 2017    Colon polyps 05/2007    Hypercholesteremia 07/1997    Narcolepsy     Paroxysmal atrial fibrillation (HCC)     Rotator cuff (capsule) sprain     TIA (transient ischemic attack)     Tobacco abuse     Unspecified sleep apnea     cpap     Past Surgical History:   Procedure Laterality Date    APPENDECTOMY      CATARACT REMOVAL Bilateral 2016    Right- Oct, Left- Dec- DR ORBISON     COLONOSCOPY  08/16/2024       colon polyps and  do  5  years    CORONARY ANGIOPLASTY WITH STENT PLACEMENT  07/2005    CORONARY ANGIOPLASTY WITH STENT PLACEMENT  11/2017    2 nd stent to LAD    CORONARY ANGIOPLASTY WITH STENT PLACEMENT  11/09/2022    Dr Corbett/Dr Pearce @ [facility]    CYSTOSCOPY N/A 11/1/2024    CYSTOSCOPY CLOT EVACUATION AND FULGURATION TURP performed by Adrian Lee MD at [facility] OR    HEMORRHOID SURGERY  09/2016    Dr Macario, every 2 weeks x 3 times     ROTATOR CUFF REPAIR Right 2008    Dr. Jacobs    ROTATOR CUFF REPAIR Left 2015    Dr Jacobs @ [facility]   rt 2015    TONSILLECTOMY      TURP N/A 10/1/2024    CYSTOSCOPY GREENLIGHT PHOTO VAPORIZATION OF PROSTATE, CYSTOLITHOPLAXY, YO and stent placement performed by Adrian Lee MD at [facility] OR     Family History   Problem Relation Age of Onset    Stroke Mother     Diabetes Mother     Heart Disease Father     Asthma Father      Outpatient Medications Marked as Taking for the 1/7/25 encounter (Office Visit) with Zander Salomon PA-C   Medication Sig Dispense Refill    finasteride (PROSCAR) 5 MG tablet Take 1 tablet by mouth daily 90 tablet 3    apixaban (ELIQUIS) 5 MG TABS tablet Take 1 tablet by mouth 2 times daily 180 tablet 3    atorvastatin (LIPITOR) 80 MG tablet Take 0.5 tablets by mouth daily 90 tablet 1    silodosin (RAPAFLO) 8 MG CAPS Take 1 capsule by mouth every evening 90 capsule 3    fluticasone (VERAMYST) 27.5 MCG/SPRAY

## 2025-01-23 ENCOUNTER — PATIENT MESSAGE (OUTPATIENT)
Dept: UROLOGY | Age: 75
End: 2025-01-23

## 2025-01-23 DIAGNOSIS — N32.81 OAB (OVERACTIVE BLADDER): Primary | ICD-10-CM

## 2025-01-24 ENCOUNTER — TELEPHONE (OUTPATIENT)
Dept: CARDIOLOGY CLINIC | Age: 75
End: 2025-01-24

## 2025-01-27 NOTE — TELEPHONE ENCOUNTER
Darin,     That is correct. There are alternatives such as Sacral Nerve Stimulation (Implanted Device), Bladder Botox (every 6-9 months procedure), or medication.     It is good to hear you are getting the desired results!    - Zander

## 2025-01-30 NOTE — TELEPHONE ENCOUNTER
We will need a paper [facility] gemtesa .  Not sure if it would be approved since he has not been on any other medication for oab.    Please advise.

## 2025-01-31 DIAGNOSIS — E78.00 HYPERCHOLESTEREMIA: ICD-10-CM

## 2025-01-31 RX ORDER — ATORVASTATIN CALCIUM 80 MG/1
40 TABLET, FILM COATED ORAL DAILY
Qty: 45 TABLET | Refills: 1 | Status: SHIPPED | OUTPATIENT
Start: 2025-01-31

## 2025-01-31 NOTE — TELEPHONE ENCOUNTER
Date of last visit:  9/6/2024  Date of next visit:  2/26/2025    Requested Prescriptions     Pending Prescriptions Disp Refills    atorvastatin (LIPITOR) 80 MG tablet 45 tablet 1     Sig: Take 0.5 tablets by mouth daily

## 2025-01-31 NOTE — TELEPHONE ENCOUNTER
[facility] ([phone] Ext [phone]) - called requesting printed Rx for Atorvastatin 80 mg as well as pt's last office visit.  Fax to [phone]

## 2025-02-03 RX ORDER — VIBEGRON 75 MG/1
75 TABLET, FILM COATED ORAL DAILY
Qty: 90 TABLET | Refills: 1 | Status: SHIPPED | OUTPATIENT
Start: 2025-02-03 | End: 2025-02-05

## 2025-02-03 RX ORDER — VIBEGRON 75 MG/1
75 TABLET, FILM COATED ORAL DAILY
Qty: 90 TABLET | Refills: 1 | Status: SHIPPED | OUTPATIENT
Start: 2025-02-03 | End: 2025-02-03

## 2025-02-03 NOTE — TELEPHONE ENCOUNTER
[facility] Myrbetriq but we tried and failed samples of this as well as Oxybutynin. Failed medications x 2.

## 2025-02-05 DIAGNOSIS — R39.15 URGENCY OF URINATION: ICD-10-CM

## 2025-02-05 DIAGNOSIS — N32.81 OAB (OVERACTIVE BLADDER): Primary | ICD-10-CM

## 2025-02-05 RX ORDER — SOLIFENACIN SUCCINATE 10 MG/1
10 TABLET, FILM COATED ORAL DAILY
Qty: 90 TABLET | Refills: 1 | Status: SHIPPED | OUTPATIENT
Start: 2025-02-05

## 2025-02-05 RX ORDER — SOLIFENACIN SUCCINATE 10 MG/1
10 TABLET, FILM COATED ORAL DAILY
Qty: 90 TABLET | Refills: 1 | Status: SHIPPED | OUTPATIENT
Start: 2025-02-05 | End: 2025-02-05

## 2025-02-05 NOTE — PROGRESS NOTES
Has failed Myrbetriq and Oxybutynin in the past. [facility] denying Gemtesa for not having failed 2 anticholinergic medications.     Trial Solifenacin 10 mg daily for 90 days.

## 2025-02-14 DIAGNOSIS — G47.10 HYPERSOMNIA: Primary | ICD-10-CM

## 2025-02-14 NOTE — TELEPHONE ENCOUNTER
Patient called in requesting a refill for methylphenidate 20mg a 90 day supply to the [facility]. LOV with Padmaja 4/9/24 and follow up on 4/8/25. Please advise.

## 2025-02-17 RX ORDER — METHYLPHENIDATE HYDROCHLORIDE 20 MG/1
20 TABLET ORAL 3 TIMES DAILY
Qty: 270 TABLET | Refills: 0 | Status: SHIPPED | OUTPATIENT
Start: 2025-02-17 | End: 2025-02-18 | Stop reason: SDUPTHER

## 2025-02-18 ENCOUNTER — TELEPHONE (OUTPATIENT)
Dept: PULMONOLOGY | Age: 75
End: 2025-02-18

## 2025-02-18 DIAGNOSIS — G47.10 HYPERSOMNIA: ICD-10-CM

## 2025-02-18 RX ORDER — METHYLPHENIDATE HYDROCHLORIDE 20 MG/1
20 TABLET ORAL 3 TIMES DAILY
Qty: 90 TABLET | Refills: 0 | Status: SHIPPED | OUTPATIENT
Start: 2025-02-18 | End: 2025-03-20

## 2025-02-18 NOTE — TELEPHONE ENCOUNTER
The pharmacy called in about the patient medication Ritalin. The prescription was order as a 90 day supply and they don't dispense a 90 day supply for a controlled substance only a 30 day. Please advise.

## 2025-02-18 NOTE — TELEPHONE ENCOUNTER
Sorry about that, I thought it looked like Lily did 90 day supply in past.  I changed it to 30 day .

## 2025-02-26 ENCOUNTER — OFFICE VISIT (OUTPATIENT)
Dept: FAMILY MEDICINE CLINIC | Age: 75
End: 2025-02-26

## 2025-02-26 VITALS
BODY MASS INDEX: 21.56 KG/M2 | RESPIRATION RATE: 16 BRPM | HEART RATE: 64 BPM | SYSTOLIC BLOOD PRESSURE: 118 MMHG | DIASTOLIC BLOOD PRESSURE: 72 MMHG | HEIGHT: 67 IN | WEIGHT: 137.38 LBS

## 2025-02-26 DIAGNOSIS — M25.542 ARTHRALGIA OF BOTH HANDS: ICD-10-CM

## 2025-02-26 DIAGNOSIS — G47.419 PRIMARY NARCOLEPSY WITHOUT CATAPLEXY: ICD-10-CM

## 2025-02-26 DIAGNOSIS — M25.541 ARTHRALGIA OF BOTH HANDS: ICD-10-CM

## 2025-02-26 DIAGNOSIS — L43.9 LICHEN PLANUS: ICD-10-CM

## 2025-02-26 DIAGNOSIS — I47.10 SVT (SUPRAVENTRICULAR TACHYCARDIA): ICD-10-CM

## 2025-02-26 DIAGNOSIS — E78.00 HYPERCHOLESTEREMIA: ICD-10-CM

## 2025-02-26 DIAGNOSIS — D68.69 SECONDARY HYPERCOAGULABLE STATE: ICD-10-CM

## 2025-02-26 DIAGNOSIS — I25.118 ATHEROSCLEROTIC HEART DISEASE OF NATIVE CORONARY ARTERY WITH OTHER FORMS OF ANGINA PECTORIS: ICD-10-CM

## 2025-02-26 DIAGNOSIS — N40.1 BPH WITH OBSTRUCTION/LOWER URINARY TRACT SYMPTOMS: ICD-10-CM

## 2025-02-26 DIAGNOSIS — N13.8 BPH WITH OBSTRUCTION/LOWER URINARY TRACT SYMPTOMS: ICD-10-CM

## 2025-02-26 DIAGNOSIS — Z00.00 MEDICARE ANNUAL WELLNESS VISIT, SUBSEQUENT: Primary | ICD-10-CM

## 2025-02-26 DIAGNOSIS — G47.39 COMPLEX SLEEP APNEA SYNDROME: ICD-10-CM

## 2025-02-26 DIAGNOSIS — I48.0 PAROXYSMAL ATRIAL FIBRILLATION (HCC): ICD-10-CM

## 2025-02-26 PROCEDURE — G8427 DOCREV CUR MEDS BY ELIG CLIN: HCPCS | Performed by: FAMILY MEDICINE

## 2025-02-26 PROCEDURE — 1036F TOBACCO NON-USER: CPT | Performed by: FAMILY MEDICINE

## 2025-02-26 PROCEDURE — 99213 OFFICE O/P EST LOW 20 MIN: CPT | Performed by: FAMILY MEDICINE

## 2025-02-26 PROCEDURE — 3017F COLORECTAL CA SCREEN DOC REV: CPT | Performed by: FAMILY MEDICINE

## 2025-02-26 PROCEDURE — 1123F ACP DISCUSS/DSCN MKR DOCD: CPT | Performed by: FAMILY MEDICINE

## 2025-02-26 PROCEDURE — G0439 PPPS, SUBSEQ VISIT: HCPCS | Performed by: FAMILY MEDICINE

## 2025-02-26 PROCEDURE — G8420 CALC BMI NORM PARAMETERS: HCPCS | Performed by: FAMILY MEDICINE

## 2025-02-26 ASSESSMENT — ENCOUNTER SYMPTOMS
TROUBLE SWALLOWING: 0
BACK PAIN: 0
SORE THROAT: 0
SHORTNESS OF BREATH: 0
CONSTIPATION: 0
ABDOMINAL PAIN: 0
CHEST TIGHTNESS: 0
EYE PAIN: 0
NAUSEA: 0
COUGH: 0
BLOOD IN STOOL: 0

## 2025-02-26 ASSESSMENT — LIFESTYLE VARIABLES
HOW OFTEN DO YOU HAVE A DRINK CONTAINING ALCOHOL: NEVER
HOW MANY STANDARD DRINKS CONTAINING ALCOHOL DO YOU HAVE ON A TYPICAL DAY: PATIENT DOES NOT DRINK

## 2025-02-26 ASSESSMENT — PATIENT HEALTH QUESTIONNAIRE - PHQ9
SUM OF ALL RESPONSES TO PHQ QUESTIONS 1-9: 0
1. LITTLE INTEREST OR PLEASURE IN DOING THINGS: NOT AT ALL
SUM OF ALL RESPONSES TO PHQ QUESTIONS 1-9: 0
SUM OF ALL RESPONSES TO PHQ9 QUESTIONS 1 & 2: 0
2. FEELING DOWN, DEPRESSED OR HOPELESS: NOT AT ALL

## 2025-02-26 NOTE — PATIENT INSTRUCTIONS
July 31, 2024  Content Version: 14.3  © 2024 [employer].   Care instructions adapted under license by [employer]. If you have questions about a medical condition or this instruction, always ask your healthcare professional. [employer], disclaims any warranty or liability for your use of this information.    Personalized Preventive Plan for Bhanu Lombardi - 2/26/2025  Medicare offers a range of preventive health benefits. Some of the tests and screenings are paid in full while other may be subject to a deductible, co-insurance, and/or copay.  Some of these benefits include a comprehensive review of your medical history including lifestyle, illnesses that may run in your family, and various assessments and screenings as appropriate.  After reviewing your medical record and screening and assessments performed today your provider may have ordered immunizations, labs, imaging, and/or referrals for you.  A list of these orders (if applicable) as well as your Preventive Care list are included within your After Visit Summary for your review.

## 2025-02-26 NOTE — PROGRESS NOTES
Cuff Size: Medium Adult   Pulse: 64   Resp: 16   Weight: 62.3 kg (137 lb 6 oz)   Height: 1.702 m (5' 7\")      Body mass index is 21.52 kg/m².                    Allergies   Allergen Reactions    Adderall [Amphetamine-Dextroamphetamine] Shortness Of Breath    Brilinta [Ticagrelor] Shortness Of Breath    Nexium [Esomeprazole] Other (See Comments)     Blood in urine    Flomax [Tamsulosin] Other (See Comments)     Back pain     Metoprolol Other (See Comments)     Fatigue and headaches      Mirabegron Other (See Comments)     Just doesn't work    Polysporin [Bacitracin-Polymyxin B] Hives    Provigil [Modafinil] Other (See Comments)     Low back pain      Prior to Visit Medications    Medication Sig Taking? Authorizing Provider   methylphenidate (RITALIN) 20 MG tablet Take 1 tablet by mouth 3 times daily for 30 days. Max Daily Amount: 60 mg Yes Dionne Sears, APRN - CNP   solifenacin (VESICARE) 10 MG tablet Take 1 tablet by mouth daily Yes Zander Salomon PA-C   atorvastatin (LIPITOR) 80 MG tablet Take 0.5 tablets by mouth daily Yes Chao Rodrigues MD   apixaban (ELIQUIS) 5 MG TABS tablet Take 1 tablet by mouth 2 times daily Yes Mercedes Jay MD   finasteride (PROSCAR) 5 MG tablet Take 1 tablet by mouth daily Yes Zander Salomon PA-C   fluticasone (VERAMYST) 27.5 MCG/SPRAY nasal spray 2 sprays by Each Nostril route daily Yes Ravi Chavarria MD   fluocinonide (LIDEX) 0.05 % gel Apply topically 2 times daily. Yes Chao Rodrigues MD   hydrocortisone 1 % cream Apply topically 2 times daily Apply topically 2 times daily. Yes Ravi Chavarria MD   Multiple Vitamins-Minerals (ICAPS AREDS 2 PO) Take 1 capsule by mouth 2 times daily Yes Ravi Chavarria MD   aspirin 81 MG chewable tablet Take 1 tablet by mouth daily Yes Ravi Chavarria MD       CareTeam (Including outside providers/suppliers regularly involved in providing care):   Patient Care Team:  Chao Rodrigues MD as PCP -

## 2025-02-27 ENCOUNTER — OFFICE VISIT (OUTPATIENT)
Dept: UROLOGY | Age: 75
End: 2025-02-27
Payer: MEDICARE

## 2025-02-27 VITALS — WEIGHT: 137 LBS | HEIGHT: 67 IN | BODY MASS INDEX: 21.5 KG/M2 | RESPIRATION RATE: 12 BRPM

## 2025-02-27 DIAGNOSIS — N40.1 BPH WITH OBSTRUCTION/LOWER URINARY TRACT SYMPTOMS: ICD-10-CM

## 2025-02-27 DIAGNOSIS — N32.81 OAB (OVERACTIVE BLADDER): Primary | ICD-10-CM

## 2025-02-27 DIAGNOSIS — R31.0 GROSS HEMATURIA: ICD-10-CM

## 2025-02-27 DIAGNOSIS — N13.8 BPH WITH OBSTRUCTION/LOWER URINARY TRACT SYMPTOMS: ICD-10-CM

## 2025-02-27 PROCEDURE — 1159F MED LIST DOCD IN RCRD: CPT | Performed by: UROLOGY

## 2025-02-27 PROCEDURE — G8427 DOCREV CUR MEDS BY ELIG CLIN: HCPCS | Performed by: UROLOGY

## 2025-02-27 PROCEDURE — 1036F TOBACCO NON-USER: CPT | Performed by: UROLOGY

## 2025-02-27 PROCEDURE — 3017F COLORECTAL CA SCREEN DOC REV: CPT | Performed by: UROLOGY

## 2025-02-27 PROCEDURE — 99214 OFFICE O/P EST MOD 30 MIN: CPT | Performed by: UROLOGY

## 2025-02-27 PROCEDURE — 1123F ACP DISCUSS/DSCN MKR DOCD: CPT | Performed by: UROLOGY

## 2025-02-27 PROCEDURE — G8420 CALC BMI NORM PARAMETERS: HCPCS | Performed by: UROLOGY

## 2025-02-27 NOTE — PROGRESS NOTES
Milton Quinones MD.     [facility] [place] [facility]  [facility] UROLOGY  [address]  [address]  [facility] [address]  Dept: [phone]  Dept Fax: [phone]  Loc: [phone]    [facility] Urology Office Note -     Patient:  Bhanu Lombardi  YOB: 1950    The patient is a 74 y.o. male who presents today for evaluation of the following problems:   Chief Complaint   Patient presents with    Follow-up     CYSTOSCOPY CLOT EVACUATION AND FULGURATION TURP 11/1/24    Benign Prostatic Hypertrophy        History of Present Illness:    BPH  Hx of TURP recently  Denovo urgency on vesicare 10    Elevated PSA  Reviewed PSA    Gross hematuria  resolved        Requested/reviewed records from Chao Rodrigues MD office and/or outside physician/EMR    (Patient's old records have been requested, reviewed and pertinent findings summarized in today's note.)    Procedures Today: N/A    Last several PSA's:  Lab Results   Component Value Date    PSA 1.20 01/17/2025    PSA 2.92 (H) 03/02/2017    PSA 2.64 (H) 02/04/2016       Last total testosterone:  No results found for: \"TESTOSTERONE\"    Urinalysis today:  No results found for this visit on 02/27/25.      Last BUN and creatinine:  Lab Results   Component Value Date    BUN 21 01/17/2025     Lab Results   Component Value Date    CREATININE 1.02 01/17/2025       Imaging Reviewed during this Office Visit:     imaging independently reviewed by MILTON QUINONES MD and radiology report verified demonstrating     No results found.      PAST MEDICAL, FAMILY AND SOCIAL HISTORY:  Past Medical History:   Diagnosis Date    Actinic keratoses     Angina effort      Replacing deprecated diagnoses    Arthritis     hands    ASHD (arteriosclerotic heart disease)     Bladder stones     Carotid disease, bilateral 2017    Colon polyps 05/2007    Hypercholesteremia 07/1997    Narcolepsy     Paroxysmal atrial fibrillation (HCC)     Rotator cuff (capsule) sprain     TIA

## 2025-03-07 ENCOUNTER — HOSPITAL ENCOUNTER (OUTPATIENT)
Dept: ULTRASOUND IMAGING | Age: 75
Discharge: HOME | End: 2025-03-07
Attending: INTERNAL MEDICINE
Payer: MEDICARE

## 2025-03-07 DIAGNOSIS — Z72.0 TOBACCO USE: ICD-10-CM

## 2025-03-07 PROCEDURE — 76706 US ABDL AORTA SCREEN AAA: CPT

## 2025-03-14 ENCOUNTER — TELEPHONE (OUTPATIENT)
Dept: CARDIOLOGY CLINIC | Age: 75
End: 2025-03-14

## 2025-03-14 NOTE — TELEPHONE ENCOUNTER
Bhanu Lombardi \"Darin\" to [facility] Clinical Staff (supporting Mercedes Jay MD)         3/14/25  1:25 PM  I recently discovered that there is a 2.5mg eliquis dose.  What determines the 2.5mg vs the 5mg.  How can we tell if the 5mg dose is right for me. I see Dr. Corbett on 5/9.

## 2025-03-26 ENCOUNTER — TELEPHONE (OUTPATIENT)
Dept: PULMONOLOGY | Age: 75
End: 2025-03-26

## 2025-04-02 ENCOUNTER — TELEPHONE (OUTPATIENT)
Dept: PULMONOLOGY | Age: 75
End: 2025-04-02

## 2025-04-02 DIAGNOSIS — G47.10 HYPERSOMNIA: ICD-10-CM

## 2025-04-02 RX ORDER — METHYLPHENIDATE HYDROCHLORIDE 20 MG/1
20 TABLET ORAL 3 TIMES DAILY
Qty: 90 TABLET | Refills: 0 | Status: SHIPPED | OUTPATIENT
Start: 2025-04-02 | End: 2025-05-02

## 2025-04-02 NOTE — TELEPHONE ENCOUNTER
Patient called about his ritlan rx it was [facility] or do [facility] have to [facility]. Please advise.

## 2025-04-11 ENCOUNTER — OFFICE VISIT (OUTPATIENT)
Age: 75
End: 2025-04-11
Payer: OTHER GOVERNMENT

## 2025-04-11 VITALS
HEART RATE: 74 BPM | DIASTOLIC BLOOD PRESSURE: 86 MMHG | TEMPERATURE: 98 F | OXYGEN SATURATION: 96 % | BODY MASS INDEX: 21.87 KG/M2 | HEIGHT: 67 IN | SYSTOLIC BLOOD PRESSURE: 126 MMHG | WEIGHT: 139.3 LBS

## 2025-04-11 DIAGNOSIS — G47.30 HYPERSOMNIA WITH SLEEP APNEA: Primary | ICD-10-CM

## 2025-04-11 DIAGNOSIS — G47.10 HYPERSOMNIA WITH SLEEP APNEA: Primary | ICD-10-CM

## 2025-04-11 DIAGNOSIS — G47.39 COMPLEX SLEEP APNEA SYNDROME: ICD-10-CM

## 2025-04-11 PROCEDURE — 1123F ACP DISCUSS/DSCN MKR DOCD: CPT | Performed by: NURSE PRACTITIONER

## 2025-04-11 PROCEDURE — 99214 OFFICE O/P EST MOD 30 MIN: CPT | Performed by: NURSE PRACTITIONER

## 2025-04-11 PROCEDURE — 1160F RVW MEDS BY RX/DR IN RCRD: CPT | Performed by: NURSE PRACTITIONER

## 2025-04-11 PROCEDURE — 1159F MED LIST DOCD IN RCRD: CPT | Performed by: NURSE PRACTITIONER

## 2025-04-11 RX ORDER — SOLRIAMFETOL 75 MG/1
TABLET, FILM COATED ORAL
Qty: 60 TABLET | Refills: 5 | Status: SHIPPED | OUTPATIENT
Start: 2025-04-11 | End: 2025-10-22

## 2025-04-11 RX ORDER — METHYLPHENIDATE HYDROCHLORIDE 20 MG/1
20 TABLET ORAL 3 TIMES DAILY
Qty: 90 TABLET | Refills: 0 | Status: SHIPPED | OUTPATIENT
Start: 2025-04-11 | End: 2025-05-11

## 2025-04-11 NOTE — PROGRESS NOTES
[facility]      Bhanu Lombardi         [id number]  4/11/2025   Chief Complaint   Patient presents with    Follow-up     1 year CSA follow up with Yash download.         Pt of Dionne Sears CNP    PAP Download:   Original or initial AHI: 14     Date of initial study: 12/27/2000      Compliant  100%     Noncompliant 0%     PAP Type ASVAuto Level  MinEPAP 5cmH20 MaxEPAP 45xiO42 MinPS 3cmH20 MaxPS 32xeQ59   Avg Hrs/Day 7 hours 31 minutes  AHI: 1.0   Leaks : 95 th percentile: 22.8   Recorded compliance dates 3/10/25-4/8/25   Machine/Mfg:   [x] ResMed    [] Respironics/Dreamstation   Interface:   [x] Nasal    [] Nasal pillows   [] FFM      Provider:      [] [facility]     [][employer]     [] [employer]    [x] [employer]    [] [employer]               [] [employer]      [] Adaptive    [] Soquel:      [] Other    Neck Size: 13.75 inches  Mallampati 3  ESS:  2  SAQLI: 94    Here is a scan of the most recent download:                  Presentation:     Bhanu Lombardi is a 74 y.o. old male who comes in for follow up regarding his complex sleep apnea. He is currently doing well using his positive airway pressure device.  He is sleeping better at night with his machine and says he has less daytime sleepiness.        The machine pressure settings are comfortable, the mask is reasonably comfortable and he is using the humidity.  There have been no ER visits, hospital visits, any new issues or medication changes since the last visit here in sleep clinic.  Retired now for 15 years  Taking Ritalin with benefit, denies side effects , stays active   Took Sunosi  for approximately 2 years, when he was on commercial insurance , currently too expensive   He did feel better with Ritalin and sunosi. He had more mental clarity and focus    He has a lot of questions today regarding his prior studies and his current diagnosis.     Equipment issues:  The pressure is  acceptable, the mask is acceptable

## 2025-04-14 ENCOUNTER — TELEPHONE (OUTPATIENT)
Age: 75
End: 2025-04-14

## 2025-04-14 DIAGNOSIS — G47.10 HYPERSOMNIA WITH SLEEP APNEA: ICD-10-CM

## 2025-04-14 DIAGNOSIS — G47.30 HYPERSOMNIA WITH SLEEP APNEA: ICD-10-CM

## 2025-04-14 RX ORDER — METHYLPHENIDATE HYDROCHLORIDE 20 MG/1
20 TABLET ORAL 3 TIMES DAILY
Qty: 90 TABLET | Refills: 0 | Status: SHIPPED | OUTPATIENT
Start: 2025-04-14 | End: 2025-05-14

## 2025-04-14 NOTE — TELEPHONE ENCOUNTER
Jaime [facility] called they need you to e-scriibe the Ritalin to them [place] [facility]. They can't take the fax. Please advise. Thank you

## 2025-05-06 ENCOUNTER — TELEPHONE (OUTPATIENT)
Age: 75
End: 2025-05-06

## 2025-05-06 NOTE — TELEPHONE ENCOUNTER
That ESS on last note was while taking RItalin. He is having more brain fog, difficulty concentrating - those were improved when he was on the combination of Ritalin and Sunosi

## 2025-05-06 NOTE — TELEPHONE ENCOUNTER
[facility] stating about his ESS score, is the ess score based on while on his Ritalin and how long has he been off sunosi? Number to call back on [phone] Roddy.

## 2025-05-07 LAB — LDL CHOLESTEROL DIRECT: 86 MG/DL

## 2025-05-07 NOTE — TELEPHONE ENCOUNTER
[facility] wanting to know if Dionne ever got back to her message. I gave her that note that Dionne put in this encounter. She asked when patient stopped taking Sunosi, I looked through the chart and told her that the first mention of patient stopping Sunosi was in Dionne's office note from 04/11/2025. She verbalized understanding.

## 2025-05-08 ENCOUNTER — RESULTS FOLLOW-UP (OUTPATIENT)
Dept: FAMILY MEDICINE CLINIC | Age: 75
End: 2025-05-08

## 2025-05-08 NOTE — TELEPHONE ENCOUNTER
Called Patient and he was driving so he will call back. He does not have a Follow Up appt with Dr Rodrigues until 9-4-2025. So will check with Dr Rodrigues about Job.

## 2025-05-08 NOTE — TELEPHONE ENCOUNTER
Patient called back. He was still driving. He asked if the message was in MyChart as he is not able to take notes with him driving. He said he will look at the message and research the medication before he decides to add any new medication. He said he would call back once he does his research.

## 2025-05-08 NOTE — TELEPHONE ENCOUNTER
----- Message from Dr. Chao Rodrigues MD sent at 5/8/2025  6:04 AM EDT -----    Chol  better and  discuss at  appt  as add zetia  most likely     See tomorrow

## 2025-05-09 ENCOUNTER — OFFICE VISIT (OUTPATIENT)
Dept: CARDIOLOGY CLINIC | Age: 75
End: 2025-05-09
Payer: OTHER GOVERNMENT

## 2025-05-09 VITALS
DIASTOLIC BLOOD PRESSURE: 72 MMHG | SYSTOLIC BLOOD PRESSURE: 118 MMHG | HEART RATE: 59 BPM | BODY MASS INDEX: 21.82 KG/M2 | HEIGHT: 67 IN | WEIGHT: 139 LBS

## 2025-05-09 DIAGNOSIS — I25.10 CORONARY ARTERY DISEASE INVOLVING NATIVE CORONARY ARTERY OF NATIVE HEART WITHOUT ANGINA PECTORIS: ICD-10-CM

## 2025-05-09 DIAGNOSIS — E78.01 FAMILIAL HYPERCHOLESTEROLEMIA: ICD-10-CM

## 2025-05-09 DIAGNOSIS — I10 PRIMARY HYPERTENSION: Primary | ICD-10-CM

## 2025-05-09 DIAGNOSIS — I48.0 PAROXYSMAL ATRIAL FIBRILLATION (HCC): ICD-10-CM

## 2025-05-09 PROCEDURE — 1123F ACP DISCUSS/DSCN MKR DOCD: CPT | Performed by: NUCLEAR MEDICINE

## 2025-05-09 PROCEDURE — 99214 OFFICE O/P EST MOD 30 MIN: CPT | Performed by: NUCLEAR MEDICINE

## 2025-05-09 PROCEDURE — 3074F SYST BP LT 130 MM HG: CPT | Performed by: NUCLEAR MEDICINE

## 2025-05-09 PROCEDURE — 1159F MED LIST DOCD IN RCRD: CPT | Performed by: NUCLEAR MEDICINE

## 2025-05-09 PROCEDURE — 3078F DIAST BP <80 MM HG: CPT | Performed by: NUCLEAR MEDICINE

## 2025-05-09 RX ORDER — EZETIMIBE 10 MG/1
10 TABLET ORAL DAILY
Qty: 90 TABLET | Refills: 3 | Status: SHIPPED | OUTPATIENT
Start: 2025-05-09

## 2025-05-09 NOTE — PROGRESS NOTES
[facility] [place] [facility]  [facility] CARDIOLOGY  [address]  [address]  [facility] [address]  Dept: [phone]  Dept Fax: [phone]  Loc: [phone]    Visit Date: 5/9/2025    Bhanu Lombardi is a 74 y.o. male who presents todayfor:  Chief Complaint   Patient presents with    1 Year Follow Up    Coronary Artery Disease    Shortness of Breath    Hypertension    Hyperlipidemia    Atrial Fibrillation     Known CAD and LAD stent  No chest pain   No changes in breathing  No use of nitro  Stress test and echo were okay   Known a fib   Seems stable  No recent episodes  Had renal strnes and hematuria   Resolved after surgery   BP is stable  No dizziness  No syncope  On statins for hyperlipidemia  No issues with the meds     HPI:  HPI  Past Medical History:   Diagnosis Date    Actinic keratoses     Angina effort      Replacing deprecated diagnoses    Arthritis     hands    ASHD (arteriosclerotic heart disease)     Bladder stones     Carotid disease, bilateral 2017    Colon polyps 05/2007    Hypercholesteremia 07/1997    Narcolepsy     Paroxysmal atrial fibrillation (HCC)     Rotator cuff (capsule) sprain     TIA (transient ischemic attack)     Tobacco abuse     Unspecified sleep apnea     cpap      Past Surgical History:   Procedure Laterality Date    APPENDECTOMY      CATARACT REMOVAL Bilateral 2016    Right- Oct, Left- Dec- DR ROBISON     COLONOSCOPY  08/16/2024       colon polyps and  do  5  years    CORONARY ANGIOPLASTY WITH STENT PLACEMENT  07/2005    CORONARY ANGIOPLASTY WITH STENT PLACEMENT  11/2017    2 nd stent to LAD    CORONARY ANGIOPLASTY WITH STENT PLACEMENT  11/09/2022    Dr Corbett/Dr Pearce @ [facility]    CYSTOSCOPY N/A 11/1/2024    CYSTOSCOPY CLOT EVACUATION AND FULGURATION TURP performed by Adrian Lee MD at [facility] OR    HEMORRHOID SURGERY  09/2016    Dr Macario, every 2 weeks x 3 times     ROTATOR CUFF REPAIR Right 2008    Dr. Jacobs    ROTATOR CUFF REPAIR Left 2015    Dr Jacobs

## 2025-05-12 ENCOUNTER — PATIENT MESSAGE (OUTPATIENT)
Dept: FAMILY MEDICINE CLINIC | Age: 75
End: 2025-05-12

## 2025-05-12 DIAGNOSIS — L43.9 LICHEN PLANUS: ICD-10-CM

## 2025-05-12 DIAGNOSIS — E78.00 HYPERCHOLESTEREMIA: ICD-10-CM

## 2025-05-13 NOTE — TELEPHONE ENCOUNTER
Date of last visit:  2/26/2025  Date of next visit:  9/4/2025    Requested Prescriptions     Pending Prescriptions Disp Refills    atorvastatin (LIPITOR) 40 MG tablet 90 tablet 1     Sig: Take 1 tablet by mouth daily    fluocinonide (LIDEX) 0.05 % gel 15 g 1     Sig: Apply topically 2 times daily.

## 2025-05-14 RX ORDER — FLUOCINONIDE 0.5 MG/G
GEL TOPICAL
Qty: 15 G | Refills: 1 | Status: SHIPPED | OUTPATIENT
Start: 2025-05-14

## 2025-05-14 RX ORDER — ATORVASTATIN CALCIUM 40 MG/1
40 TABLET, FILM COATED ORAL DAILY
Qty: 90 TABLET | Refills: 1 | Status: SHIPPED | OUTPATIENT
Start: 2025-05-14

## 2025-06-03 ENCOUNTER — PATIENT MESSAGE (OUTPATIENT)
Age: 75
End: 2025-06-03

## 2025-06-03 DIAGNOSIS — G47.30 HYPERSOMNIA WITH SLEEP APNEA: ICD-10-CM

## 2025-06-03 DIAGNOSIS — G47.10 HYPERSOMNIA WITH SLEEP APNEA: ICD-10-CM

## 2025-06-05 RX ORDER — METHYLPHENIDATE HYDROCHLORIDE 20 MG/1
20 TABLET ORAL 3 TIMES DAILY
Qty: 90 TABLET | Refills: 0 | Status: SHIPPED | OUTPATIENT
Start: 2025-06-05 | End: 2025-07-05

## 2025-06-05 NOTE — TELEPHONE ENCOUNTER
Received refill request for Methylphenidate.   Medication was last ordered by Dionne.   Medication was last ordered on 04/14/2025 with 0 refills.     Patient was last seen in the office 04/11/2025.   Does patient have a scheduled follow up?: yes - 04/23/2026    Medication needs to be sent to [facility].     Thank you, please advise!    Patient's Allergies:  Allergies   Allergen Reactions    Adderall [Amphetamine-Dextroamphetamine] Shortness Of Breath    Brilinta [Ticagrelor] Shortness Of Breath    Nexium [Esomeprazole] Other (See Comments)     Blood in urine    Flomax [Tamsulosin] Other (See Comments)     Back pain     Metoprolol Other (See Comments)     Fatigue and headaches      Mirabegron Other (See Comments)     Just doesn't work    Polysporin [Bacitracin-Polymyxin B] Hives    Provigil [Modafinil] Other (See Comments)     Low back pain

## 2025-08-25 ENCOUNTER — TELEPHONE (OUTPATIENT)
Dept: CARDIOLOGY CLINIC | Age: 75
End: 2025-08-25

## 2025-08-26 DIAGNOSIS — E78.00 HYPERCHOLESTEREMIA: Primary | ICD-10-CM

## 2025-08-30 LAB — LDL CHOLESTEROL DIRECT: 45 MG/DL

## 2025-09-04 ENCOUNTER — OFFICE VISIT (OUTPATIENT)
Dept: FAMILY MEDICINE CLINIC | Age: 75
End: 2025-09-04

## 2025-09-04 VITALS
HEART RATE: 60 BPM | WEIGHT: 130.25 LBS | HEIGHT: 67 IN | BODY MASS INDEX: 20.44 KG/M2 | SYSTOLIC BLOOD PRESSURE: 122 MMHG | RESPIRATION RATE: 20 BRPM | DIASTOLIC BLOOD PRESSURE: 70 MMHG

## 2025-09-04 DIAGNOSIS — N13.8 BPH WITH OBSTRUCTION/LOWER URINARY TRACT SYMPTOMS: ICD-10-CM

## 2025-09-04 DIAGNOSIS — M19.011 ARTHRITIS OF RIGHT SHOULDER: ICD-10-CM

## 2025-09-04 DIAGNOSIS — I25.118 ATHEROSCLEROTIC HEART DISEASE OF NATIVE CORONARY ARTERY WITH OTHER FORMS OF ANGINA PECTORIS: ICD-10-CM

## 2025-09-04 DIAGNOSIS — N40.1 BPH WITH OBSTRUCTION/LOWER URINARY TRACT SYMPTOMS: ICD-10-CM

## 2025-09-04 DIAGNOSIS — E78.00 HYPERCHOLESTEREMIA: ICD-10-CM

## 2025-09-04 DIAGNOSIS — I48.0 PAROXYSMAL ATRIAL FIBRILLATION (HCC): Primary | ICD-10-CM

## 2025-09-04 DIAGNOSIS — G47.419 PRIMARY NARCOLEPSY WITHOUT CATAPLEXY: ICD-10-CM

## 2025-09-04 DIAGNOSIS — G47.39 COMPLEX SLEEP APNEA SYNDROME: ICD-10-CM

## 2025-09-04 SDOH — ECONOMIC STABILITY: FOOD INSECURITY: WITHIN THE PAST 12 MONTHS, YOU WORRIED THAT YOUR FOOD WOULD RUN OUT BEFORE YOU GOT MONEY TO BUY MORE.: NEVER TRUE

## 2025-09-04 SDOH — ECONOMIC STABILITY: FOOD INSECURITY: WITHIN THE PAST 12 MONTHS, THE FOOD YOU BOUGHT JUST DIDN'T LAST AND YOU DIDN'T HAVE MONEY TO GET MORE.: NEVER TRUE

## 2025-09-04 ASSESSMENT — ENCOUNTER SYMPTOMS
BLOOD IN STOOL: 0
ABDOMINAL PAIN: 0
BACK PAIN: 0
CHEST TIGHTNESS: 0
SORE THROAT: 0
EYE PAIN: 0
NAUSEA: 0
SHORTNESS OF BREATH: 0
TROUBLE SWALLOWING: 0
CONSTIPATION: 0
COUGH: 0

## (undated) DEVICE — SOLUTION IRRIG 3000ML 0.9% SOD CHL USP UROMATIC PLAS CONT

## (undated) DEVICE — DRAINBAG,ANTI-REFLUX TOWER,L/F,2000ML,LL: Brand: MEDLINE

## (undated) DEVICE — HF-RESECTION ELECTRODE PLASMALOOP LOOP, MEDIUM, 24 FR., 12°-30°, ESG TURIS: Brand: OLYMPUS

## (undated) DEVICE — SOLUTION IRRIG 1000ML STRL H2O USP PLAS POUR BTL

## (undated) DEVICE — ADAPTER URO SCP UROLOK LL

## (undated) DEVICE — CYSTO: Brand: MEDLINE INDUSTRIES, INC.

## (undated) DEVICE — GUIDEWIRE URO L150CM DIA .035IN STIFF NIT HYDRPHL STR TIP

## (undated) DEVICE — LASER FIBER: Brand: MOXY

## (undated) DEVICE — CATHETER,FOLEY,3WAY,SILI-ELAST,20FR,30ML: Brand: MEDLINE

## (undated) DEVICE — FIBER LASER SURG FIBER 1000 MH RND TIP HOLM SMARTSCOPE DISP

## (undated) DEVICE — CATHETER,FOLEY,SILI-ELAST,LTX,18FR,30ML: Brand: MEDLINE

## (undated) DEVICE — CATHETER URETH 22FR 30CC BLLN F 3 W SPEC M RND TIP TWO

## (undated) DEVICE — SOLUTION IV 1000ML 0.9% SOD CHL PH 5 INJ USP VIAFLX PLAS

## (undated) DEVICE — 1000ML,PRESSURE INFUSER W/STOPCOCK: Brand: MEDLINE

## (undated) DEVICE — SINGLE ACTION PUMPING SYSTEM

## (undated) DEVICE — DUAL LUMEN URETERAL CATHETER